# Patient Record
Sex: FEMALE | Race: BLACK OR AFRICAN AMERICAN | NOT HISPANIC OR LATINO
[De-identification: names, ages, dates, MRNs, and addresses within clinical notes are randomized per-mention and may not be internally consistent; named-entity substitution may affect disease eponyms.]

---

## 2019-09-23 ENCOUNTER — RESULT REVIEW (OUTPATIENT)
Age: 29
End: 2019-09-23

## 2020-01-06 ENCOUNTER — APPOINTMENT (OUTPATIENT)
Dept: OBGYN | Facility: CLINIC | Age: 30
End: 2020-01-06
Payer: COMMERCIAL

## 2020-01-06 ENCOUNTER — NON-APPOINTMENT (OUTPATIENT)
Age: 30
End: 2020-01-06

## 2020-01-06 VITALS
DIASTOLIC BLOOD PRESSURE: 70 MMHG | HEIGHT: 62 IN | BODY MASS INDEX: 45.45 KG/M2 | WEIGHT: 247 LBS | SYSTOLIC BLOOD PRESSURE: 128 MMHG

## 2020-01-06 DIAGNOSIS — Z87.42 PERSONAL HISTORY OF OTHER DISEASES OF THE FEMALE GENITAL TRACT: ICD-10-CM

## 2020-01-06 DIAGNOSIS — Z78.9 OTHER SPECIFIED HEALTH STATUS: ICD-10-CM

## 2020-01-06 DIAGNOSIS — A53.0 LATENT SYPHILIS, UNSPECIFIED AS EARLY OR LATE: ICD-10-CM

## 2020-01-06 LAB
BILIRUB UR QL STRIP: NORMAL
GLUCOSE UR-MCNC: NORMAL
HCG UR QL: 0.2 EU/DL
HCG UR QL: POSITIVE
HGB UR QL STRIP.AUTO: NORMAL
KETONES UR-MCNC: NORMAL
LEUKOCYTE ESTERASE UR QL STRIP: NORMAL
NITRITE UR QL STRIP: NORMAL
PH UR STRIP: 6
PROT UR STRIP-MCNC: NORMAL
QUALITY CONTROL: YES
SP GR UR STRIP: 1.02

## 2020-01-06 PROCEDURE — 81025 URINE PREGNANCY TEST: CPT

## 2020-01-06 PROCEDURE — 76801 OB US < 14 WKS SINGLE FETUS: CPT

## 2020-01-06 PROCEDURE — 81003 URINALYSIS AUTO W/O SCOPE: CPT | Mod: QW

## 2020-01-06 PROCEDURE — 0501F PRENATAL FLOW SHEET: CPT

## 2020-01-10 LAB
BACTERIA UR CULT: NORMAL
C TRACH RRNA SPEC QL NAA+PROBE: NOT DETECTED
N GONORRHOEA RRNA SPEC QL NAA+PROBE: NOT DETECTED
SOURCE AMPLIFICATION: NORMAL
SOURCE AMPLIFICATION: NORMAL
T VAGINALIS RRNA SPEC QL NAA+PROBE: NOT DETECTED

## 2020-01-10 RX ORDER — VITAMIN A, ASCORBIC ACID, CHOLECALCIFEROL, TOCOPHEROL, THIAMINE MONONITRATE, RIBOFLAVIN, PYRIDOXINE, FOLIC ACID, CYANOCOBALAMIN, CALCIUM CARBONATE, FERROUS FUMARATE, ZINC OXIDE, CUPRIC OXIDE, NIACINAMIDE, AND FISH OIL 27-1-250MG
27-1 & 312 KIT ORAL DAILY
Qty: 1 | Refills: 11 | Status: DISCONTINUED | COMMUNITY
Start: 2020-01-06 | End: 2020-01-10

## 2020-02-03 ENCOUNTER — APPOINTMENT (OUTPATIENT)
Dept: OBGYN | Facility: CLINIC | Age: 30
End: 2020-02-03
Payer: COMMERCIAL

## 2020-02-03 ENCOUNTER — LABORATORY RESULT (OUTPATIENT)
Age: 30
End: 2020-02-03

## 2020-02-03 ENCOUNTER — NON-APPOINTMENT (OUTPATIENT)
Age: 30
End: 2020-02-03

## 2020-02-03 VITALS
BODY MASS INDEX: 43.79 KG/M2 | DIASTOLIC BLOOD PRESSURE: 72 MMHG | WEIGHT: 238 LBS | HEIGHT: 62 IN | SYSTOLIC BLOOD PRESSURE: 130 MMHG

## 2020-02-03 LAB
BILIRUB UR QL STRIP: NORMAL
GLUCOSE UR-MCNC: NORMAL
HCG UR QL: 0.2 EU/DL
HGB UR QL STRIP.AUTO: NORMAL
KETONES UR-MCNC: NORMAL
LEUKOCYTE ESTERASE UR QL STRIP: NORMAL
NITRITE UR QL STRIP: NORMAL
PH UR STRIP: 6.5
PROT UR STRIP-MCNC: NORMAL
SP GR UR STRIP: 1.02

## 2020-02-03 PROCEDURE — 0502F SUBSEQUENT PRENATAL CARE: CPT

## 2020-02-03 PROCEDURE — 76801 OB US < 14 WKS SINGLE FETUS: CPT

## 2020-02-04 LAB
ABO + RH PNL BLD: NORMAL
BASOPHILS # BLD AUTO: 0.04 K/UL
BASOPHILS NFR BLD AUTO: 0.3 %
BLD GP AB SCN SERPL QL: NORMAL
EOSINOPHIL # BLD AUTO: 0.12 K/UL
EOSINOPHIL NFR BLD AUTO: 1 %
HBV SURFACE AG SERPL QL IA: NONREACTIVE
HCT VFR BLD CALC: 29.1 %
HCV AB SER QL: NONREACTIVE
HCV S/CO RATIO: 0.11 S/CO
HGB BLD-MCNC: 7.5 G/DL
HIV1+2 AB SPEC QL IA.RAPID: NONREACTIVE
IMM GRANULOCYTES NFR BLD AUTO: 0.4 %
LYMPHOCYTES # BLD AUTO: 2.87 K/UL
LYMPHOCYTES NFR BLD AUTO: 24 %
MAN DIFF?: NORMAL
MCHC RBC-ENTMCNC: 16.9 PG
MCHC RBC-ENTMCNC: 25.8 GM/DL
MCV RBC AUTO: 65.5 FL
MEV IGG FLD QL IA: 51 AU/ML
MEV IGG+IGM SER-IMP: POSITIVE
MONOCYTES # BLD AUTO: 0.69 K/UL
MONOCYTES NFR BLD AUTO: 5.8 %
NEUTROPHILS # BLD AUTO: 8.2 K/UL
NEUTROPHILS NFR BLD AUTO: 68.5 %
PLATELET # BLD AUTO: 495 K/UL
RBC # BLD: 4.44 M/UL
RBC # FLD: 24.1 %
RUBV IGG FLD-ACNC: 2.3 INDEX
RUBV IGG SER-IMP: POSITIVE
T PALLIDUM AB SER QL IA: NEGATIVE
TSH SERPL-ACNC: 1.5 UIU/ML
VZV AB TITR SER: POSITIVE
VZV IGG SER IF-ACNC: 1927 INDEX
WBC # FLD AUTO: 11.97 K/UL

## 2020-02-06 LAB
B19V IGG SER QL IA: 0.2 INDEX
B19V IGG+IGM SER-IMP: NEGATIVE
B19V IGG+IGM SER-IMP: NORMAL
B19V IGM FLD-ACNC: 0.2
B19V IGM SER-ACNC: NEGATIVE
HGB A MFR BLD: 98.1 %
HGB A2 MFR BLD: 1.9 %
HGB FRACT BLD-IMP: NORMAL
LEAD BLD-MCNC: <1 UG/DL

## 2020-02-07 ENCOUNTER — NON-APPOINTMENT (OUTPATIENT)
Age: 30
End: 2020-02-07

## 2020-02-07 ENCOUNTER — APPOINTMENT (OUTPATIENT)
Dept: OBGYN | Facility: CLINIC | Age: 30
End: 2020-02-07
Payer: COMMERCIAL

## 2020-02-07 VITALS
BODY MASS INDEX: 45.27 KG/M2 | SYSTOLIC BLOOD PRESSURE: 122 MMHG | DIASTOLIC BLOOD PRESSURE: 78 MMHG | WEIGHT: 246 LBS | HEIGHT: 62 IN

## 2020-02-07 LAB
BILIRUB UR QL STRIP: NORMAL
GLUCOSE UR-MCNC: NORMAL
HCG UR QL: 0.2 EU/DL
HGB UR QL STRIP.AUTO: NORMAL
KETONES UR-MCNC: NORMAL
LEUKOCYTE ESTERASE UR QL STRIP: NORMAL
NITRITE UR QL STRIP: NORMAL
PH UR STRIP: 5
PROT UR STRIP-MCNC: NORMAL
SP GR UR STRIP: 1.03

## 2020-02-07 PROCEDURE — 76801 OB US < 14 WKS SINGLE FETUS: CPT

## 2020-02-07 PROCEDURE — 36415 COLL VENOUS BLD VENIPUNCTURE: CPT

## 2020-02-07 PROCEDURE — 0502F SUBSEQUENT PRENATAL CARE: CPT

## 2020-02-10 ENCOUNTER — TRANSCRIPTION ENCOUNTER (OUTPATIENT)
Age: 30
End: 2020-02-10

## 2020-02-12 ENCOUNTER — OUTPATIENT (OUTPATIENT)
Dept: OUTPATIENT SERVICES | Facility: HOSPITAL | Age: 30
LOS: 1 days | Discharge: HOME | End: 2020-02-12

## 2020-02-13 ENCOUNTER — RESULT REVIEW (OUTPATIENT)
Age: 30
End: 2020-02-13

## 2020-02-13 ENCOUNTER — APPOINTMENT (OUTPATIENT)
Dept: HEMATOLOGY ONCOLOGY | Facility: CLINIC | Age: 30
End: 2020-02-13
Payer: COMMERCIAL

## 2020-02-13 VITALS
SYSTOLIC BLOOD PRESSURE: 163 MMHG | OXYGEN SATURATION: 100 % | HEIGHT: 62.99 IN | RESPIRATION RATE: 18 BRPM | HEART RATE: 112 BPM | TEMPERATURE: 99.1 F | WEIGHT: 246.9 LBS | DIASTOLIC BLOOD PRESSURE: 96 MMHG | BODY MASS INDEX: 43.75 KG/M2

## 2020-02-13 LAB
ALBUMIN SERPL ELPH-MCNC: 4.1 G/DL
ALP BLD-CCNC: 50 U/L
ALT SERPL-CCNC: 9 U/L
ANION GAP SERPL CALC-SCNC: 11 MMOL/L
AST SERPL-CCNC: 18 U/L
BILIRUB SERPL-MCNC: 0.2 MG/DL
BUN SERPL-MCNC: 7 MG/DL
CALCIUM SERPL-MCNC: 9.4 MG/DL
CHLORIDE SERPL-SCNC: 102 MMOL/L
CO2 SERPL-SCNC: 19 MMOL/L
CREAT SERPL-MCNC: 0.58 MG/DL
CRP SERPL-MCNC: 1.25 MG/DL
ERYTHROCYTE [SEDIMENTATION RATE] IN BLOOD: 25 MM/HR — HIGH (ref 0–15)
FERRITIN SERPL-MCNC: 8 NG/ML
FOLATE SERPL-MCNC: >20 NG/ML
GLUCOSE SERPL-MCNC: 113 MG/DL
IRON SATN MFR SERPL: 6 %
IRON SERPL-MCNC: 25 UG/DL
LDH SERPL-CCNC: 195 U/L
POTASSIUM SERPL-SCNC: 4.2 MMOL/L
PROT SERPL-MCNC: 7.3 G/DL
SODIUM SERPL-SCNC: 133 MMOL/L
TIBC SERPL-MCNC: 451 UG/DL
UIBC SERPL-MCNC: 425 UG/DL
VIT B12 SERPL-MCNC: 521 PG/ML

## 2020-02-13 PROCEDURE — 99204 OFFICE O/P NEW MOD 45 MIN: CPT | Mod: 25

## 2020-02-13 NOTE — HISTORY OF PRESENT ILLNESS
[0 - No Distress] : Distress Level: 0 [de-identified] : 30 yo woman with 12 weeks pregnancy, referred for evaluation and management of anemia of pregnancy.\par \par Patient feels fatigue, tired, lightheadedness, dizziness, positive for palpitations, no chest pain, denies melena or hematochezia. Denies prior history of anemia, this is her second pregnancy, no history of anemia with first pregnancy. \par \par Labs 2/4/2020: WBC 11.97, Hb 7.5 g/dl, Hct 29.1%, MCV 65.5%, RDW 24.1%, . \par \par

## 2020-02-13 NOTE — REVIEW OF SYSTEMS
[Negative] : Allergic/Immunologic [Fatigue] : fatigue [Dizziness] : dizziness [SOB on Exertion] : shortness of breath during exertion [Palpitations] : palpitations [Easy Bleeding] : no tendency for easy bleeding

## 2020-02-13 NOTE — CONSULT LETTER
[Dear  ___] : Dear  [unfilled], [Consult Letter:] : I had the pleasure of evaluating your patient, [unfilled]. [Please see my note below.] : Please see my note below. [Consult Closing:] : Thank you very much for allowing me to participate in the care of this patient.  If you have any questions, please do not hesitate to contact me. [Sincerely,] : Sincerely, [FreeTextEntry2] : Dr Phil Alonso

## 2020-02-13 NOTE — ASSESSMENT
[FreeTextEntry1] : 30 yo woman with 12 weeks pregnancy, referred for evaluation and management of anemia of pregnancy.\par \par Patient feels fatigue, tired, lightheadedness, dizziness, positive for palpitations, no chest pain, denies melena or hematochezia. Denies prior history of anemia, this is her second pregnancy, no history of anemia with first pregnancy. \par \par Labs 2/4/2020: WBC 11.97, Hb 7.5 g/dl, Hct 29.1%, MCV 65.5%, RDW 24.1%, . \par \par I had a detailed discussion today with the patient regarding the natural history, epidemiology, diagnosis and treatment of  iron deficiency anemia. I reviewed her laboratory studies in detail today. I then discussed treatment with Venofer 200 mg every other day x 5 doses.  I reviewed with patient the benefits versus risks of therapy. I answered all her questions to satisfaction.\par \par Greater than 50% of the encounter time was spent on counseling and coordination of care for SALEEM  and I have spent  45   minutes of face to face time with the patient.\par \par RTC 2 months.

## 2020-02-14 LAB
DEPRECATED KAPPA LC FREE/LAMBDA SER: 0.93 RATIO
HAV IGM SER QL: NONREACTIVE
HAV IGM SER QL: NONREACTIVE
HBV CORE IGG+IGM SER QL: NONREACTIVE
HBV CORE IGM SER QL: NONREACTIVE
HBV CORE IGM SER QL: NONREACTIVE
HBV SURFACE AB SER QL: REACTIVE
HBV SURFACE AG SER QL: NONREACTIVE
HBV SURFACE AG SER QL: NONREACTIVE
HCV AB SER QL: NONREACTIVE
HCV S/CO RATIO: 0.11 S/CO
KAPPA LC CSF-MCNC: 1.23 MG/DL
KAPPA LC SERPL-MCNC: 1.15 MG/DL
M PROTEIN SPEC IFE-MCNC: NORMAL

## 2020-02-15 LAB
ALPHA - GLOBIN COMMON MUTATION RESULT: NORMAL
ALPHA - GLOBIN MUTATION VERBATIM: NORMAL

## 2020-02-19 ENCOUNTER — APPOINTMENT (OUTPATIENT)
Dept: OBGYN | Facility: CLINIC | Age: 30
End: 2020-02-19
Payer: COMMERCIAL

## 2020-02-19 PROCEDURE — 36415 COLL VENOUS BLD VENIPUNCTURE: CPT

## 2020-02-26 ENCOUNTER — APPOINTMENT (OUTPATIENT)
Dept: INFUSION THERAPY | Facility: HOSPITAL | Age: 30
End: 2020-02-26

## 2020-02-26 ENCOUNTER — EMERGENCY (EMERGENCY)
Facility: HOSPITAL | Age: 30
LOS: 1 days | Discharge: HOME | End: 2020-02-26
Attending: EMERGENCY MEDICINE | Admitting: EMERGENCY MEDICINE
Payer: COMMERCIAL

## 2020-02-26 ENCOUNTER — OUTPATIENT (OUTPATIENT)
Dept: OUTPATIENT SERVICES | Facility: HOSPITAL | Age: 30
LOS: 1 days | End: 2020-02-26

## 2020-02-26 VITALS
HEART RATE: 105 BPM | OXYGEN SATURATION: 100 % | DIASTOLIC BLOOD PRESSURE: 90 MMHG | TEMPERATURE: 99 F | SYSTOLIC BLOOD PRESSURE: 133 MMHG | RESPIRATION RATE: 18 BRPM

## 2020-02-26 DIAGNOSIS — D64.9 ANEMIA, UNSPECIFIED: ICD-10-CM

## 2020-02-26 LAB
ALBUMIN SERPL ELPH-MCNC: 3.7 G/DL — SIGNIFICANT CHANGE UP (ref 3.3–5)
ALP SERPL-CCNC: 49 U/L — SIGNIFICANT CHANGE UP (ref 40–120)
ALT FLD-CCNC: 15 U/L — SIGNIFICANT CHANGE UP (ref 4–33)
ANION GAP SERPL CALC-SCNC: 12 MMO/L — SIGNIFICANT CHANGE UP (ref 7–14)
ANISOCYTOSIS BLD QL: SIGNIFICANT CHANGE UP
APTT BLD: 24.3 SEC — LOW (ref 27.5–36.3)
AST SERPL-CCNC: 15 U/L — SIGNIFICANT CHANGE UP (ref 4–32)
BASOPHILS # BLD AUTO: 0.02 K/UL — SIGNIFICANT CHANGE UP (ref 0–0.2)
BASOPHILS NFR BLD AUTO: 0.2 % — SIGNIFICANT CHANGE UP (ref 0–2)
BASOPHILS NFR SPEC: 0 % — SIGNIFICANT CHANGE UP (ref 0–2)
BILIRUB SERPL-MCNC: < 0.2 MG/DL — LOW (ref 0.2–1.2)
BLASTS # FLD: 0 % — SIGNIFICANT CHANGE UP (ref 0–0)
BLD GP AB SCN SERPL QL: NEGATIVE — SIGNIFICANT CHANGE UP
BUN SERPL-MCNC: 4 MG/DL — LOW (ref 7–23)
CALCIUM SERPL-MCNC: 9.4 MG/DL — SIGNIFICANT CHANGE UP (ref 8.4–10.5)
CHLORIDE SERPL-SCNC: 104 MMOL/L — SIGNIFICANT CHANGE UP (ref 98–107)
CO2 SERPL-SCNC: 18 MMOL/L — LOW (ref 22–31)
CREAT SERPL-MCNC: 0.5 MG/DL — SIGNIFICANT CHANGE UP (ref 0.5–1.3)
ELLIPTOCYTES BLD QL SMEAR: SIGNIFICANT CHANGE UP
EOSINOPHIL # BLD AUTO: 0 K/UL — SIGNIFICANT CHANGE UP (ref 0–0.5)
EOSINOPHIL NFR BLD AUTO: 0 % — SIGNIFICANT CHANGE UP (ref 0–6)
EOSINOPHIL NFR FLD: 0 % — SIGNIFICANT CHANGE UP (ref 0–6)
GIANT PLATELETS BLD QL SMEAR: PRESENT — SIGNIFICANT CHANGE UP
GLUCOSE SERPL-MCNC: 126 MG/DL — HIGH (ref 70–99)
HCT VFR BLD CALC: 26.3 % — LOW (ref 34.5–45)
HGB BLD-MCNC: 7.2 G/DL — LOW (ref 11.5–15.5)
HYPOCHROMIA BLD QL: SIGNIFICANT CHANGE UP
IMM GRANULOCYTES NFR BLD AUTO: 1.1 % — SIGNIFICANT CHANGE UP (ref 0–1.5)
INR BLD: 1.03 — SIGNIFICANT CHANGE UP (ref 0.88–1.17)
LYMPHOCYTES # BLD AUTO: 0.89 K/UL — LOW (ref 1–3.3)
LYMPHOCYTES # BLD AUTO: 7.3 % — LOW (ref 13–44)
LYMPHOCYTES NFR SPEC AUTO: 3.5 % — LOW (ref 13–44)
MCHC RBC-ENTMCNC: 17.2 PG — LOW (ref 27–34)
MCHC RBC-ENTMCNC: 27.4 % — LOW (ref 32–36)
MCV RBC AUTO: 62.8 FL — LOW (ref 80–100)
METAMYELOCYTES # FLD: 0 % — SIGNIFICANT CHANGE UP (ref 0–1)
MICROCYTES BLD QL: SIGNIFICANT CHANGE UP
MONOCYTES # BLD AUTO: 0.27 K/UL — SIGNIFICANT CHANGE UP (ref 0–0.9)
MONOCYTES NFR BLD AUTO: 2.2 % — SIGNIFICANT CHANGE UP (ref 2–14)
MONOCYTES NFR BLD: 0.9 % — LOW (ref 2–9)
MYELOCYTES NFR BLD: 0 % — SIGNIFICANT CHANGE UP (ref 0–0)
NEUTROPHIL AB SER-ACNC: 95.6 % — HIGH (ref 43–77)
NEUTROPHILS # BLD AUTO: 10.93 K/UL — HIGH (ref 1.8–7.4)
NEUTROPHILS NFR BLD AUTO: 89.2 % — HIGH (ref 43–77)
NEUTS BAND # BLD: 0 % — SIGNIFICANT CHANGE UP (ref 0–6)
NRBC # FLD: 0 K/UL — SIGNIFICANT CHANGE UP (ref 0–0)
OTHER - HEMATOLOGY %: 0 — SIGNIFICANT CHANGE UP
PLATELET # BLD AUTO: 466 K/UL — HIGH (ref 150–400)
PLATELET COUNT - ESTIMATE: NORMAL — SIGNIFICANT CHANGE UP
PMV BLD: 9.4 FL — SIGNIFICANT CHANGE UP (ref 7–13)
POIKILOCYTOSIS BLD QL AUTO: SIGNIFICANT CHANGE UP
POLYCHROMASIA BLD QL SMEAR: SIGNIFICANT CHANGE UP
POTASSIUM SERPL-MCNC: 4.2 MMOL/L — SIGNIFICANT CHANGE UP (ref 3.5–5.3)
POTASSIUM SERPL-SCNC: 4.2 MMOL/L — SIGNIFICANT CHANGE UP (ref 3.5–5.3)
PROMYELOCYTES # FLD: 0 % — SIGNIFICANT CHANGE UP (ref 0–0)
PROT SERPL-MCNC: 7.4 G/DL — SIGNIFICANT CHANGE UP (ref 6–8.3)
PROTHROM AB SERPL-ACNC: 11.9 SEC — SIGNIFICANT CHANGE UP (ref 9.8–13.1)
RBC # BLD: 4.19 M/UL — SIGNIFICANT CHANGE UP (ref 3.8–5.2)
RBC # FLD: 22.6 % — HIGH (ref 10.3–14.5)
RH IG SCN BLD-IMP: POSITIVE — SIGNIFICANT CHANGE UP
RH IG SCN BLD-IMP: POSITIVE — SIGNIFICANT CHANGE UP
SMUDGE CELLS # BLD: PRESENT — SIGNIFICANT CHANGE UP
SODIUM SERPL-SCNC: 134 MMOL/L — LOW (ref 135–145)
VARIANT LYMPHS # BLD: 0 % — SIGNIFICANT CHANGE UP
WBC # BLD: 12.24 K/UL — HIGH (ref 3.8–10.5)
WBC # FLD AUTO: 12.24 K/UL — HIGH (ref 3.8–10.5)

## 2020-02-26 PROCEDURE — 71045 X-RAY EXAM CHEST 1 VIEW: CPT | Mod: 26

## 2020-02-26 PROCEDURE — 99218: CPT

## 2020-02-26 RX ORDER — SODIUM CHLORIDE 9 MG/ML
1000 INJECTION INTRAMUSCULAR; INTRAVENOUS; SUBCUTANEOUS ONCE
Refills: 0 | Status: COMPLETED | OUTPATIENT
Start: 2020-02-26 | End: 2020-02-26

## 2020-02-26 RX ADMIN — SODIUM CHLORIDE 1000 MILLILITER(S): 9 INJECTION INTRAMUSCULAR; INTRAVENOUS; SUBCUTANEOUS at 16:10

## 2020-02-26 NOTE — CONSULT NOTE ADULT - ASSESSMENT
28 yo , LMP 2019 at 13+3 wks gestation (NIKHIL 20) p/w symptomatic anemia with H/H 7.2/26.3. Fetal heart rate confirmed by bedside sono on presentation to ED. Patient has no obstetric complaints at this time.

## 2020-02-26 NOTE — ED ADULT NURSE NOTE - OBJECTIVE STATEMENT
Pt received to spot 22 14 weeks pregnant, second pregnancy complaining of dizziness and palpitations that began this morning. Pt reports she has had similar episodes recently with this episode occurring after she received an iron transfusion this morning for hx of anemia. Pt a&ox4, skin intact, respirations even and unlabored, pt reports feeling palpations now. Pt sinus tachy on CM, PA Petra at bedside, will await orders and continue to monitor.

## 2020-02-26 NOTE — ED PROVIDER NOTE - NS ED ROS FT
ROS:  GENERAL: No fever, no chills  EYES: no change in vision  HEENT: no trouble swallowing, no trouble speaking  CARDIAC: no chest pain  PULMONARY: no cough, +shortness of breath, +BUTCHER   GI: no abdominal pain, no nausea, no vomiting, no diarrhea, no constipation  : No dysuria, no frequency, no change in appearance, or odor of urine  SKIN: no rashes, +pallor  NEURO: no headache, no weakness  MSK: No joint pain

## 2020-02-26 NOTE — ED PROVIDER NOTE - OBJECTIVE STATEMENT
28 y/o F PMHx anemia  currently 14 weeks pregnant here w/ intermittent palpitations and shortness of breath x 3 weeks. Sx begin randomly, even while at rest, lasting about 1 minute. NO hx of similar episodes in the past. 30 y/o F PMHx anemia  currently 14 weeks pregnant here w/ intermittent palpitations and shortness of breath x 3 weeks. Sx begin randomly, even while at rest, lasting about 1 minute, however worse w/ exertion. NO hx of similar episodes in the past. No vaginal bleeding now, but admits to heavy menses previously. Saw a hematologist 2 weeks ago, was recommended to get IV iron first, possible blood transfusion at some point. Denies fevers, chills, N/V, chest pain, calf pain, recent travel, or any other complaints.

## 2020-02-26 NOTE — ED CDU PROVIDER INITIAL DAY NOTE - OBJECTIVE STATEMENT
1 y/o F PMHx anemia  currently 14 weeks pregnant here w/ intermittent palpitations and shortness of breath x 3 weeks. Sx begin randomly, even while at rest

## 2020-02-26 NOTE — ED PROVIDER NOTE - CLINICAL SUMMARY MEDICAL DECISION MAKING FREE TEXT BOX
Betsey: 14 wks preg, h/o anemia, p/w palpitations and SOBgot iron infusion today. Decreased breath sounds (B) upper lobes. No LE edema. Not likely PNA: no infectious Sx (eg, purulent cough). Bedside echo for baby check and look for RV strain, though PE unlikely this early in pregnancy. Sx most likely 2/2 anemia.

## 2020-02-26 NOTE — ED CDU PROVIDER INITIAL DAY NOTE - NS ED ROS FT
ROS:  GENERAL: No fever, no chills  EYES: no change in vision  HEENT: no trouble swallowing, no trouble speaking  CARDIAC: no chest pain  PULMONARY: +shortness of breath  GI: no abdominal pain, no nausea, no vomiting, no diarrhea, no constipation  : No dysuria, no frequency, no change in appearance, or odor of urine  SKIN: no rashes  NEURO: no headache, no weakness  MSK: No joint pain

## 2020-02-26 NOTE — ED PROVIDER NOTE - ATTENDING CONTRIBUTION TO CARE
I performed a face-to-face evaluation of the patient and performed a history and physical examination. I agree with the history and physical examination.    14 wks preg, h/o anemia, p/w palpitations and SOBgot iron infusion today. Decreased breath sounds (B) upper lobes. No LE edema. Not likely PNA: no infectious Sx (eg, purulent cough). Bedside echo for baby check and look for RV strain, though PE unlikely this early in pregnancy. Sx most likely 2/2 anemia.

## 2020-02-26 NOTE — ED ADULT TRIAGE NOTE - CHIEF COMPLAINT QUOTE
Pt states she is 14 weeks pregnant and received an iron infusion at 9:30 am and since then she is very lightheaded and feels palpitations and her heart racing.

## 2020-02-26 NOTE — ED CDU PROVIDER INITIAL DAY NOTE - DETAILS
28 y/o F PMHx anemia  currently 14 weeks pregnant here w/ intermittent palpitations and shortness of breath x 3 weeks. Sx begin randomly, even while at rest.     On review, patient's Hg found to be low likely causing her presenting symptoms. Patient denies any active bleeding at this time. Patient discussed with her hematologist who suggests a transfusion given symptomatic presentation. The patient admitted to CDU for transfusion and observation.

## 2020-02-26 NOTE — ED PROVIDER NOTE - PROGRESS NOTE DETAILS
Betsey: Hb ~7. Did shared decision-making to see if she wanted a blood transfusion. Walked around ER. Very symptomatic. CDU for blood transfusion. FETAL  done via Bedside US w/ Dr. Russell. Spoke w/ heme fellow on call, reviewed pts outpatient chart, does not seem to mention a contraindication to transfusion, state the hgb was in the 8s so they wanted to try iv iron first. Spoke w/ OB, also agree pt is symptomatic and should receive blood, but will come to consult and see the pt first.

## 2020-02-26 NOTE — ED PROVIDER NOTE - PHYSICAL EXAMINATION
Vital signs reviewed.   CONSTITUTIONAL: Well-appearing; well-nourished; in no apparent distress.   HEAD: Normocephalic, atraumatic.  EYES: PERRL, EOM intact, conjunctiva PALE.  ENT: normal nose; no rhinorrhea; normal pharynx with no tonsillar hypertrophy, no erythema, no exudate, no lymphadenopathy.  NECK/LYMPH: Supple; non-tender; no cervical lymphadenopathy.  CARD: Normal S1, S2; tachycardic. No lower extremity edema or calf tenderness zan.   RESP: Normal chest excursion with respiration; breath sounds clear and equal bilaterally; no wheezes, rhonchi, or rales.  ABD/GI: soft, non-distended; non-tender; no palpable organomegaly, no pulsatile mass.  EXT/MS: moves all extremities; distal pulses are normal, no pedal edema.  SKIN: Pale, warm; dry; good turgor; no apparent lesions or exudate noted.  NEURO: Awake, alert, oriented x 3, no gross deficits, CN II-XII grossly intact, no motor or sensory deficit noted.  PSYCH: Normal mood; appropriate affect.

## 2020-02-26 NOTE — ED CDU PROVIDER INITIAL DAY NOTE - ATTENDING CONTRIBUTION TO CARE
30 y/o F PMHx anemia  currently 14 weeks pregnant here w/ intermittent palpitations and shortness of breath x 3 weeks. Sx begin randomly, even while at rest.     On review, patient's Hg found to be low likely causing her presenting symptoms. Patient denies any active bleeding at this time. Patient discussed with her hematologist who suggests a transfusion given symptomatic presentation. The patient admitted to CDU for transfusion and observation.

## 2020-02-26 NOTE — CONSULT NOTE ADULT - SUBJECTIVE AND OBJECTIVE BOX
28 y/o LMP 11/2019 at 13+3 wks gestation (NIKHIL 8/31/20) p/w lightheadedness fatigue, palpitations and SOB x 3 weeks with worsening of symptoms today. She reports h/o anemia since 8/2019, worsening during pregnancy. She had a Fe transfusion in the office this morning. She denies VB, LOF, ctx, and endorses FM despite early gestation.     Name of GYN Physician: Dr. Alonso     OBhx:      Gynhx: Denies fibroids, ovarian cysts, endometriosis, AUB, STIs, abnormal Pap smears     PMhx: PAST MEDICAL & SURGICAL HISTORY:  Anemia      PSHx:     Fhx: FAMILY HISTORY:      Social: denies tobacco, alcohol, recreational drugs     Home meds:     Hospital Meds:   MEDICATIONS  (STANDING):    MEDICATIONS  (PRN):      Allergies    No Known Allergies    Intolerances        Vital Signs Last 24 Hrs  T(C): 37.1 (27 Feb 2020 00:30), Max: 37.1 (27 Feb 2020 00:30)  T(F): 98.7 (27 Feb 2020 00:30), Max: 98.7 (27 Feb 2020 00:30)  HR: 96 (27 Feb 2020 00:30) (87 - 105)  BP: 125/57 (27 Feb 2020 00:30) (111/61 - 133/90)  BP(mean): --  RR: 18 (27 Feb 2020 00:30) (16 - 18)  SpO2: 100% (27 Feb 2020 00:30) (100% - 100%)    Physical Exam:   General: sitting comfortably in bed, NAD   CV: RR S1S2 no m/r/g  Lungs: CTA b/l, good air flow b/l   Back: No CVA tenderness b/l  Abd: Soft, non-tender, non-distended. No rebound or guarding     Pelvic:   Ext: non-tender b/l, no edema     LABS:                              7.2    12.24 )-----------( 466      ( 26 Feb 2020 16:07 )             26.3     02-26    134<L>  |  104  |  4<L>  ----------------------------<  126<H>  4.2   |  18<L>  |  0.50    Ca    9.4      26 Feb 2020 16:07    TPro  7.4  /  Alb  3.7  /  TBili  < 0.2<L>  /  DBili  x   /  AST  15  /  ALT  15  /  AlkPhos  49  02-26    I&O's Detail    PT/INR - ( 26 Feb 2020 16:07 )   PT: 11.9 SEC;   INR: 1.03          PTT - ( 26 Feb 2020 16:07 )  PTT:24.3 SEC      RADIOLOGY & ADDITIONAL STUDIES: 28 y/o  LMP 2019 at 13+3 wks gestation (NIKHIL 20) p/w lightheadedness, fatigue, palpitations and SOB x3 weeks with worsening of symptoms today. She reports h/o anemia since 2019 with worsening during pregnancy. She had a Fe transfusion in the office this morning for chronic anemia, then developed worsening of symptoms which prompted her to come into the ED. She denies VB, LOF, ctx, and endorses FM despite early gestation.     Name of GYN Physician: Dr. Alonso     OBhx: C/Sx1 (), mTOPx1   Gynhx: h/o fibroids, resolved ovarian cysts, ?Abnl Pap smears last year  PMhx: Anemia   PSHx: C/S x1 ()    Social: denies tobacco, alcohol, recreational drugs   Meds: ASA, s/p Fe transfusion (20)   Allergies: NKDA     Vital Signs Last 24 Hrs  T(C): 37.1 (2020 00:30), Max: 37.1 (2020 00:30)  T(F): 98.7 (2020 00:30), Max: 98.7 (2020 00:30)  HR: 96 (2020 00:30) (87 - 105)  BP: 125/57 (2020 00:30) (111/61 - 133/90)  BP(mean): --  RR: 18 (2020 00:30) (16 - 18)  SpO2: 100% (2020 00:30) (100% - 100%)    Physical Exam:   General: sitting comfortably in bed, NAD   CV: RRR S1S2 no m/r/g  Lungs: CTA b/l, good air flow b/l   Back: No CVA tenderness b/l  Abd: Soft, non-tender, non-distended. No rebound or guarding     Pelvic: deferred   Ext: non-tender b/l, no edema     LABS:                         7.2    12.24 )-----------( 466      ( 2020 16:07 )             26.3     02-26    134<L>  |  104  |  4<L>  ----------------------------<  126<H>  4.2   |  18<L>  |  0.50    Ca    9.4      2020 16:07    TPro  7.4  /  Alb  3.7  /  TBili  < 0.2<L>  /  DBili  x   /  AST  15  /  ALT  15  /  AlkPhos  49  -    I&O's Detail    PT/INR - ( 2020 16:07 )   PT: 11.9 SEC;   INR: 1.03          PTT - ( 2020 16:07 )  PTT:24.3 SEC 30 y/o  LMP 2019 at 13+3 wks gestation (NIKHIL 20) p/w lightheadedness, fatigue, palpitations and SOB x3 weeks with worsening of symptoms today. She reports h/o anemia since 2019 with worsening during pregnancy. She had a Fe transfusion in the office this morning for chronic anemia, then developed worsening of symptoms in the afternoon which prompted her to come into the ED. She denies VB, LOF, ctx, and endorses FM despite early gestation.     Name of GYN Physician: Dr. Alonso     OBhx: C/Sx1 (), mTOPx1   Gynhx: h/o fibroids, resolved ovarian cysts, ?Abnl Pap smears last year  PMhx: Anemia   PSHx: C/S x1 ()    Social: denies tobacco, alcohol, recreational drugs   Meds: ASA, s/p Fe transfusion (20)   Allergies: NKDA     Vital Signs Last 24 Hrs  T(C): 37.1 (2020 00:30), Max: 37.1 (2020 00:30)  T(F): 98.7 (2020 00:30), Max: 98.7 (2020 00:30)  HR: 96 (2020 00:30) (87 - 105)  BP: 125/57 (2020 00:30) (111/61 - 133/90)  BP(mean): --  RR: 18 (2020 00:30) (16 - 18)  SpO2: 100% (2020 00:30) (100% - 100%)    Physical Exam:   General: sitting comfortably in bed, NAD   CV: RRR S1S2 no m/r/g  Lungs: CTA b/l, good air flow b/l   Back: No CVA tenderness b/l  Abd: Soft, non-tender, non-distended. No rebound or guarding     Pelvic: deferred   Ext: non-tender b/l, no edema     LABS:                         7.2    12.24 )-----------( 466      ( 2020 16:07 )             26.3     02-26    134<L>  |  104  |  4<L>  ----------------------------<  126<H>  4.2   |  18<L>  |  0.50    Ca    9.4      2020 16:07    TPro  7.4  /  Alb  3.7  /  TBili  < 0.2<L>  /  DBili  x   /  AST  15  /  ALT  15  /  AlkPhos  49  02-26    I&O's Detail    PT/INR - ( 2020 16:07 )   PT: 11.9 SEC;   INR: 1.03          PTT - ( 2020 16:07 )  PTT:24.3 SEC

## 2020-02-26 NOTE — CONSULT NOTE ADULT - PROBLEM SELECTOR RECOMMENDATION 9
- No acute OB interventions at this time   - Agree with CDU admission and 1upRBC transfusion for symptomatic anemia   - F/u post-transfusion CBC       Esra Nilay PGY2  d/w Dr. Bravo

## 2020-02-27 VITALS
HEART RATE: 92 BPM | TEMPERATURE: 99 F | RESPIRATION RATE: 18 BRPM | DIASTOLIC BLOOD PRESSURE: 69 MMHG | OXYGEN SATURATION: 100 % | SYSTOLIC BLOOD PRESSURE: 131 MMHG

## 2020-02-27 DIAGNOSIS — R11.2 NAUSEA WITH VOMITING, UNSPECIFIED: ICD-10-CM

## 2020-02-27 DIAGNOSIS — D64.9 ANEMIA, UNSPECIFIED: ICD-10-CM

## 2020-02-27 DIAGNOSIS — D46.9 MYELODYSPLASTIC SYNDROME, UNSPECIFIED: ICD-10-CM

## 2020-02-27 DIAGNOSIS — D50.9 IRON DEFICIENCY ANEMIA, UNSPECIFIED: ICD-10-CM

## 2020-02-27 LAB
HCT VFR BLD CALC: 27.1 % — LOW (ref 34.5–45)
HCT VFR BLD CALC: 28.9 % — LOW (ref 34.5–45)
HGB BLD-MCNC: 8 G/DL — LOW (ref 11.5–15.5)
HGB BLD-MCNC: 8.1 G/DL — LOW (ref 11.5–15.5)
MCHC RBC-ENTMCNC: 18.8 PG — LOW (ref 27–34)
MCHC RBC-ENTMCNC: 19.7 PG — LOW (ref 27–34)
MCHC RBC-ENTMCNC: 27.7 % — LOW (ref 32–36)
MCHC RBC-ENTMCNC: 29.9 % — LOW (ref 32–36)
MCV RBC AUTO: 65.8 FL — LOW (ref 80–100)
MCV RBC AUTO: 68 FL — LOW (ref 80–100)
NRBC # FLD: 0.03 K/UL — SIGNIFICANT CHANGE UP (ref 0–0)
NRBC # FLD: 0.03 K/UL — SIGNIFICANT CHANGE UP (ref 0–0)
PLATELET # BLD AUTO: 405 K/UL — HIGH (ref 150–400)
PLATELET # BLD AUTO: 436 K/UL — HIGH (ref 150–400)
PMV BLD: 9.8 FL — SIGNIFICANT CHANGE UP (ref 7–13)
PMV BLD: 9.9 FL — SIGNIFICANT CHANGE UP (ref 7–13)
RBC # BLD: 4.12 M/UL — SIGNIFICANT CHANGE UP (ref 3.8–5.2)
RBC # BLD: 4.25 M/UL — SIGNIFICANT CHANGE UP (ref 3.8–5.2)
RBC # FLD: 24.1 % — HIGH (ref 10.3–14.5)
RBC # FLD: 24.2 % — HIGH (ref 10.3–14.5)
WBC # BLD: 16.68 K/UL — HIGH (ref 3.8–10.5)
WBC # BLD: 17.01 K/UL — HIGH (ref 3.8–10.5)
WBC # FLD AUTO: 16.68 K/UL — HIGH (ref 3.8–10.5)
WBC # FLD AUTO: 17.01 K/UL — HIGH (ref 3.8–10.5)

## 2020-02-27 PROCEDURE — 99217: CPT

## 2020-02-27 NOTE — ED CDU PROVIDER DISPOSITION NOTE - PATIENT PORTAL LINK FT
You can access the FollowMyHealth Patient Portal offered by [facility] by registering at the following website: [email or web address]. By joining [employer]’s FollowMyHealth portal, you will also be able to view your health information using other applications (apps) compatible with our system.

## 2020-02-27 NOTE — ED CDU PROVIDER SUBSEQUENT DAY NOTE - MEDICAL DECISION MAKING DETAILS
28 y/o F PMHx anemia  currently 14 weeks pregnant here w/ intermittent palpitations and shortness of breath x 3 weeks. Noted to be anemic at with hgb of 7.2. Pt transferred to CDU for 2 units PRBC.

## 2020-02-27 NOTE — ED CDU PROVIDER SUBSEQUENT DAY NOTE - ATTENDING CONTRIBUTION TO CARE
Isai: I have seen and examined the patient face to face, have reviewed and addended the HPI, PE and a/p as necessary.     28 y/o F PMHx anemia  currently 14 weeks pregnant here w/ intermittent palpitations and shortness of breath x 3 weeks. Noted to be anemic at with hgb of 7.2. Noted to have received 2 units PRBC with no acute issues.  Pt reports generalized fatigue.  Received Iron infusion yesterday.  Pt noted to have no nausea, vomiting, abdominal pain, no vaginal bleeding, no dysuria.  GEN - NAD; well appearing; A+O x3; non-toxic appearing; CARD -s1s2, RRR, no M,G,R; PULM - CTA b/l, symmetric breath sounds; ABD -  +BS, ND, NT, soft, no guarding, no rebound, no masses; BACK - no CVA tenderness, Normal  spine; EXT - symmetric pulses, 2+ dp, capillary refill < 2 seconds, no cyanosis, no edema; NEURO - no focal neuro deficits, no slurred speech    Plan: Pending repeat cbc after 2 units prbc, will repeat FHR.  If appropriate rise will likely discharge home.

## 2020-02-27 NOTE — ED CDU PROVIDER DISPOSITION NOTE - ATTENDING CONTRIBUTION TO CARE
Isai: I have seen and examined the patient face to face, have reviewed and addended the HPI, PE and a/p as necessary.

## 2020-02-27 NOTE — ED CDU PROVIDER DISPOSITION NOTE - CLINICAL COURSE
Detail Level: Zone 30 y/o F PMHx anemia  currently 14 weeks pregnant here w/ intermittent palpitations and shortness of breath x 3 weeks. Noted to be anemic at with hgb of 7.2. Noted to have received 2 units PRBC with no acute issues.  Pt reports generalized fatigue.  Received Iron infusion yesterday.  Pt noted to have no nausea, vomiting, abdominal pain, no vaginal bleeding, no dysuria. Patient received 2 units PRBC noted to have repeat Hgb 8.0 and 8.1.  Discussed with OB noted to be cleared for discharge.

## 2020-02-27 NOTE — ED CDU PROVIDER SUBSEQUENT DAY NOTE - PROGRESS NOTE DETAILS
Patient seen and examined at bedside; noted to have received 2 units PRBC with no acute issues.  Pt reports generalized fatigue.  Received Iron infusion yesterday.  Pt noted to have no nausea, vomiting, abdominal pain.  GEN - NAD; well appearing; A+O x3; non-toxic appearing; CARD -s1s2, RRR, no M,G,R; PULM - CTA b/l, symmetric breath sounds; ABD -  +BS, ND, NT, soft, no guarding, no rebound, no masses; BACK - no CVA tenderness, Normal  spine; EXT - symmetric pulses, 2+ dp, capillary refill < 2 seconds, no cyanosis, no edema; NEURO - no focal neuro deficits, no slurred speech repeat CBC 8, 8.1. Patient reassessed, sitting comfortably in chair in NAD, denies any complaints. States feeling better, symptoms improved. spoke w/ GYN, okay for discharge if patient is stable and no other symptoms, repeat  BPM. Pt is medically stable for discharge and follow up with PMD and GYN. The patient was given verbal and written discharge instructions. Specifically, instructions when to return to the ED and when to seek follow-up from their pcp was discussed. Any specialty follow-up was discussed, including how to make an appointment.  Instructions were discussed in simple, plain language and was understood by the patient. The patient understands that should their symptoms worsen or any new symptoms arise, they should return to the ED immediately for further evaluation. All pt's questions were answered. Patient verbalizes understanding.

## 2020-02-27 NOTE — ED CDU PROVIDER SUBSEQUENT DAY NOTE - PHYSICAL EXAMINATION
Vital signs reviewed.   CONSTITUTIONAL: Well-appearing; well-nourished; in no apparent distress. Non-toxic appearing.   HEAD: Normocephalic, atraumatic.  EYES: PERRL, EOM intact, conjunctiva and sclera WNL.  ENT: normal nose; no rhinorrhea; .  CARD: Normal S1, S2; no murmurs, rubs, or gallops noted.  RESP: Normal chest excursion with respiration; breath sounds clear and equal bilaterally; no wheezes, rhonchi, or rales.  EXT/MS: moves all extremities  SKIN: Normal for age and race;  NEURO: Awake, alert, oriented x 3, no gross deficits  PSYCH: Normal mood; appropriate affect.

## 2020-02-27 NOTE — ED CDU PROVIDER DISPOSITION NOTE - NSFOLLOWUPINSTRUCTIONS_ED_ALL_ED_FT
Follow up with your PMD within 48-72 hrs. Follow up with your OBGYN within 1-2 days or call our clinic  [phone]. Rest, increase your fluids. No heavy lifting. Worsening pain, vaginal bleeding, vomiting, dizziness, weakness or ANY NEW CONCERNING SYMPTOMS return to the emergency department.

## 2020-02-27 NOTE — ED CDU PROVIDER SUBSEQUENT DAY NOTE - HISTORY
Refer to initial CDU note- " 28 y/o F PMHx anemia  currently 14 weeks pregnant here w/ intermittent palpitations and shortness of breath x 3 weeks. Sx begin randomly, even while at rest. On review, patient's Hg found to be low". Pt has hx of anemia, recently started Iron transfusion. Hgb in ED noted to be 7.2. OB/GYN was consulted and agree with plan for transfusion. The patient admitted to CDU for transfusion repeat labs and observation.      In interim- Patient resting comfortably, no complaints noted. Blood transfusion in process. Ob/gyn following. Will repeat CBC this am. Will continue to monitor closely.

## 2020-02-28 ENCOUNTER — APPOINTMENT (OUTPATIENT)
Dept: INFUSION THERAPY | Facility: HOSPITAL | Age: 30
End: 2020-02-28

## 2020-03-02 ENCOUNTER — NON-APPOINTMENT (OUTPATIENT)
Age: 30
End: 2020-03-02

## 2020-03-02 ENCOUNTER — APPOINTMENT (OUTPATIENT)
Dept: OBGYN | Facility: CLINIC | Age: 30
End: 2020-03-02
Payer: COMMERCIAL

## 2020-03-02 VITALS
WEIGHT: 243 LBS | HEIGHT: 62 IN | BODY MASS INDEX: 44.72 KG/M2 | DIASTOLIC BLOOD PRESSURE: 72 MMHG | SYSTOLIC BLOOD PRESSURE: 122 MMHG

## 2020-03-02 LAB
BILIRUB UR QL STRIP: NORMAL
GLUCOSE UR-MCNC: NORMAL
HCG UR QL: 0.2 EU/DL
HGB UR QL STRIP.AUTO: NORMAL
KETONES UR-MCNC: NORMAL
LEUKOCYTE ESTERASE UR QL STRIP: NORMAL
NITRITE UR QL STRIP: NORMAL
PH UR STRIP: 6
PROT UR STRIP-MCNC: NORMAL
SP GR UR STRIP: 1.03

## 2020-03-02 PROCEDURE — 0502F SUBSEQUENT PRENATAL CARE: CPT

## 2020-03-03 ENCOUNTER — NON-APPOINTMENT (OUTPATIENT)
Age: 30
End: 2020-03-03

## 2020-03-03 ENCOUNTER — APPOINTMENT (OUTPATIENT)
Dept: OBGYN | Facility: CLINIC | Age: 30
End: 2020-03-03
Payer: COMMERCIAL

## 2020-03-03 VITALS
BODY MASS INDEX: 44.72 KG/M2 | WEIGHT: 243 LBS | HEIGHT: 62 IN | SYSTOLIC BLOOD PRESSURE: 148 MMHG | DIASTOLIC BLOOD PRESSURE: 78 MMHG

## 2020-03-03 LAB
BILIRUB UR QL STRIP: NORMAL
GLUCOSE UR-MCNC: NORMAL
HCG UR QL: 0.2 EU/DL
HGB UR QL STRIP.AUTO: NORMAL
KETONES UR-MCNC: NORMAL
LEUKOCYTE ESTERASE UR QL STRIP: NORMAL
NITRITE UR QL STRIP: NORMAL
PH UR STRIP: 5.5
PROT UR STRIP-MCNC: NORMAL
SP GR UR STRIP: 1.03

## 2020-03-03 PROCEDURE — 76801 OB US < 14 WKS SINGLE FETUS: CPT

## 2020-03-03 PROCEDURE — 0502F SUBSEQUENT PRENATAL CARE: CPT

## 2020-03-04 ENCOUNTER — ASOB RESULT (OUTPATIENT)
Age: 30
End: 2020-03-04

## 2020-03-04 ENCOUNTER — APPOINTMENT (OUTPATIENT)
Dept: MATERNAL FETAL MEDICINE | Facility: CLINIC | Age: 30
End: 2020-03-04
Payer: COMMERCIAL

## 2020-03-04 ENCOUNTER — APPOINTMENT (OUTPATIENT)
Dept: ANTEPARTUM | Facility: CLINIC | Age: 30
End: 2020-03-04
Payer: COMMERCIAL

## 2020-03-04 PROCEDURE — 99201 OFFICE OUTPATIENT NEW 10 MINUTES: CPT | Mod: 25

## 2020-03-04 PROCEDURE — 76801 OB US < 14 WKS SINGLE FETUS: CPT

## 2020-03-05 ENCOUNTER — APPOINTMENT (OUTPATIENT)
Dept: ANTEPARTUM | Facility: CLINIC | Age: 30
End: 2020-03-05

## 2020-03-06 ENCOUNTER — APPOINTMENT (OUTPATIENT)
Dept: INFUSION THERAPY | Facility: HOSPITAL | Age: 30
End: 2020-03-06

## 2020-03-06 ENCOUNTER — EMERGENCY (EMERGENCY)
Facility: HOSPITAL | Age: 30
LOS: 1 days | Discharge: HOME | End: 2020-03-06
Attending: EMERGENCY MEDICINE | Admitting: EMERGENCY MEDICINE
Payer: COMMERCIAL

## 2020-03-06 VITALS
OXYGEN SATURATION: 98 % | SYSTOLIC BLOOD PRESSURE: 139 MMHG | RESPIRATION RATE: 20 BRPM | TEMPERATURE: 98 F | HEART RATE: 112 BPM | DIASTOLIC BLOOD PRESSURE: 87 MMHG

## 2020-03-06 PROCEDURE — 99284 EMERGENCY DEPT VISIT MOD MDM: CPT

## 2020-03-06 NOTE — ED ADULT TRIAGE NOTE - CHIEF COMPLAINT QUOTE
"I am 15 weeks pregnant, 16 weeks on Monday. Due day 8/31. SOLANGE burton starting to have vaginal bleeding tonight, just now. denies pain" spoke with L/D triage RN. per L/D triage pt 14 weeks and 4 days to stay in ED.

## 2020-03-07 VITALS
RESPIRATION RATE: 18 BRPM | DIASTOLIC BLOOD PRESSURE: 66 MMHG | OXYGEN SATURATION: 98 % | HEART RATE: 98 BPM | SYSTOLIC BLOOD PRESSURE: 128 MMHG | TEMPERATURE: 98 F

## 2020-03-07 DIAGNOSIS — Z98.891 HISTORY OF UTERINE SCAR FROM PREVIOUS SURGERY: Chronic | ICD-10-CM

## 2020-03-07 DIAGNOSIS — N93.9 ABNORMAL UTERINE AND VAGINAL BLEEDING, UNSPECIFIED: ICD-10-CM

## 2020-03-07 LAB
ALBUMIN SERPL ELPH-MCNC: 3.4 G/DL — SIGNIFICANT CHANGE UP (ref 3.3–5)
ALP SERPL-CCNC: 45 U/L — SIGNIFICANT CHANGE UP (ref 40–120)
ALT FLD-CCNC: 15 U/L — SIGNIFICANT CHANGE UP (ref 4–33)
ANION GAP SERPL CALC-SCNC: 15 MMO/L — HIGH (ref 7–14)
ANISOCYTOSIS BLD QL: SIGNIFICANT CHANGE UP
AST SERPL-CCNC: 22 U/L — SIGNIFICANT CHANGE UP (ref 4–32)
BASOPHILS # BLD AUTO: 0.04 K/UL — SIGNIFICANT CHANGE UP (ref 0–0.2)
BASOPHILS NFR BLD AUTO: 0.3 % — SIGNIFICANT CHANGE UP (ref 0–2)
BASOPHILS NFR SPEC: 0.9 % — SIGNIFICANT CHANGE UP (ref 0–2)
BILIRUB SERPL-MCNC: < 0.2 MG/DL — LOW (ref 0.2–1.2)
BLASTS # FLD: 0 % — SIGNIFICANT CHANGE UP (ref 0–0)
BLD GP AB SCN SERPL QL: NEGATIVE — SIGNIFICANT CHANGE UP
BUN SERPL-MCNC: 5 MG/DL — LOW (ref 7–23)
CALCIUM SERPL-MCNC: 9.4 MG/DL — SIGNIFICANT CHANGE UP (ref 8.4–10.5)
CHLORIDE SERPL-SCNC: 104 MMOL/L — SIGNIFICANT CHANGE UP (ref 98–107)
CO2 SERPL-SCNC: 17 MMOL/L — LOW (ref 22–31)
CREAT SERPL-MCNC: 0.48 MG/DL — LOW (ref 0.5–1.3)
DACRYOCYTES BLD QL SMEAR: SLIGHT — SIGNIFICANT CHANGE UP
EOSINOPHIL # BLD AUTO: 0.13 K/UL — SIGNIFICANT CHANGE UP (ref 0–0.5)
EOSINOPHIL NFR BLD AUTO: 1 % — SIGNIFICANT CHANGE UP (ref 0–6)
EOSINOPHIL NFR FLD: 0 % — SIGNIFICANT CHANGE UP (ref 0–6)
GIANT PLATELETS BLD QL SMEAR: PRESENT — SIGNIFICANT CHANGE UP
GLUCOSE SERPL-MCNC: 87 MG/DL — SIGNIFICANT CHANGE UP (ref 70–99)
HCG SERPL-ACNC: SIGNIFICANT CHANGE UP MIU/ML
HCT VFR BLD CALC: 31 % — LOW (ref 34.5–45)
HGB BLD-MCNC: 9.1 G/DL — LOW (ref 11.5–15.5)
IMM GRANULOCYTES NFR BLD AUTO: 0.4 % — SIGNIFICANT CHANGE UP (ref 0–1.5)
LYMPHOCYTES # BLD AUTO: 24.3 % — SIGNIFICANT CHANGE UP (ref 13–44)
LYMPHOCYTES # BLD AUTO: 3.31 K/UL — HIGH (ref 1–3.3)
LYMPHOCYTES NFR SPEC AUTO: 20.9 % — SIGNIFICANT CHANGE UP (ref 13–44)
MCHC RBC-ENTMCNC: 20.4 PG — LOW (ref 27–34)
MCHC RBC-ENTMCNC: 29.4 % — LOW (ref 32–36)
MCV RBC AUTO: 69.7 FL — LOW (ref 80–100)
METAMYELOCYTES # FLD: 0 % — SIGNIFICANT CHANGE UP (ref 0–1)
MICROCYTES BLD QL: SIGNIFICANT CHANGE UP
MONOCYTES # BLD AUTO: 0.72 K/UL — SIGNIFICANT CHANGE UP (ref 0–0.9)
MONOCYTES NFR BLD AUTO: 5.3 % — SIGNIFICANT CHANGE UP (ref 2–14)
MONOCYTES NFR BLD: 5.2 % — SIGNIFICANT CHANGE UP (ref 2–9)
MYELOCYTES NFR BLD: 0 % — SIGNIFICANT CHANGE UP (ref 0–0)
NEUTROPHIL AB SER-ACNC: 71.3 % — SIGNIFICANT CHANGE UP (ref 43–77)
NEUTROPHILS # BLD AUTO: 9.34 K/UL — HIGH (ref 1.8–7.4)
NEUTROPHILS NFR BLD AUTO: 68.7 % — SIGNIFICANT CHANGE UP (ref 43–77)
NEUTS BAND # BLD: 0 % — SIGNIFICANT CHANGE UP (ref 0–6)
NRBC # FLD: 0 K/UL — SIGNIFICANT CHANGE UP (ref 0–0)
OTHER - HEMATOLOGY %: 0 — SIGNIFICANT CHANGE UP
OVALOCYTES BLD QL SMEAR: SIGNIFICANT CHANGE UP
PLATELET # BLD AUTO: 317 K/UL — SIGNIFICANT CHANGE UP (ref 150–400)
PLATELET COUNT - ESTIMATE: NORMAL — SIGNIFICANT CHANGE UP
PMV BLD: 11 FL — SIGNIFICANT CHANGE UP (ref 7–13)
POIKILOCYTOSIS BLD QL AUTO: SLIGHT — SIGNIFICANT CHANGE UP
POLYCHROMASIA BLD QL SMEAR: SLIGHT — SIGNIFICANT CHANGE UP
POTASSIUM SERPL-MCNC: 4 MMOL/L — SIGNIFICANT CHANGE UP (ref 3.5–5.3)
POTASSIUM SERPL-SCNC: 4 MMOL/L — SIGNIFICANT CHANGE UP (ref 3.5–5.3)
PROMYELOCYTES # FLD: 0 % — SIGNIFICANT CHANGE UP (ref 0–0)
PROT SERPL-MCNC: 7.2 G/DL — SIGNIFICANT CHANGE UP (ref 6–8.3)
RBC # BLD: 4.45 M/UL — SIGNIFICANT CHANGE UP (ref 3.8–5.2)
RBC # FLD: 28.5 % — HIGH (ref 10.3–14.5)
RH IG SCN BLD-IMP: POSITIVE — SIGNIFICANT CHANGE UP
SCHISTOCYTES BLD QL AUTO: SIGNIFICANT CHANGE UP
SODIUM SERPL-SCNC: 136 MMOL/L — SIGNIFICANT CHANGE UP (ref 135–145)
VARIANT LYMPHS # BLD: 1.7 % — SIGNIFICANT CHANGE UP
WBC # BLD: 13.6 K/UL — HIGH (ref 3.8–10.5)
WBC # FLD AUTO: 13.6 K/UL — HIGH (ref 3.8–10.5)

## 2020-03-07 PROCEDURE — 76805 OB US >/= 14 WKS SNGL FETUS: CPT | Mod: 26

## 2020-03-07 PROCEDURE — 76817 TRANSVAGINAL US OBSTETRIC: CPT | Mod: 26

## 2020-03-07 NOTE — ED PROVIDER NOTE - PATIENT PORTAL LINK FT
You can access the FollowMyHealth Patient Portal offered by [facility] by registering at the following website: [email or web address]. By joining [employer]’s FollowMyHealth portal, you will also be able to view your health information using other applications (apps) compatible with our system.

## 2020-03-07 NOTE — ED ADULT NURSE NOTE - OBJECTIVE STATEMENT
pt received in room 5, A&Ox3 c/o vaginal bleeding. States second pregnancy, started having heavy vaginal bleeding starting this afternoon with clots. States she is approx 14 weeks pregnant. Denies pain, N/V/D, fevers, weakness or lightheadedness. Pt "unsure of how much blood" since first encounter. Awaiting MD evaluation. Resp even and unlabored. Does report she needed a blood transfusion last week for low iron. VS as noted. Will continue to monitor.

## 2020-03-07 NOTE — CONSULT NOTE ADULT - SUBJECTIVE AND OBJECTIVE BOX
R2 GYN ED CONSULT NOTE    SUBJECTIVE:    28yo  @ 14.5wks presenting w/ vaginal bleeding. Patient said that she had some spotting a few weeks ago which stopped. Then she felt a large movement from the baby today with some spotting and a small clot. She said that the bleeding is similar to a light period. She denies feeling Ctx/LOF. She said that she received a blood transfusion last week for symptomatic anemia. PNC otherwise uncomplicated.     Pt denies fever, chills, nausea, vomiting, diarrhea, headache, constipation, dizzyness, syncope, chest pain, palpitations, shortness of breath, dysuria, urgency, frequency, abdominal/pelvic pain, vaginal discharge/odor/pain/itching, breast lumps/bumps, dyspareunia.    Primary OB/GYN: [facility]  OBH:1x c/s, 1x SAB  GYNH: denies hx  abnl paps, sti, +1 fibroid about the size of an orange she says, unsure of location, + ovarian cysts  PMSH: Denies  MEDS: Fe, PNV  ALL: nkda  SOCH: denies t/e/d; safe at home  FAMH: denies fam hx of breast/uterine/ovarian/colon cancer  ROS: negative except per hpi    OBJECTIVE:    VITAL SIGNS:  Vital Signs Last 24 Hrs  T(C): 36.9 (07 Mar 2020 00:36), Max: 36.9 (07 Mar 2020 00:36)  T(F): 98.5 (07 Mar 2020 00:36), Max: 98.5 (07 Mar 2020 00:36)  HR: 98 (07 Mar 2020 00:36) (98 - 112)  BP: 128/66 (07 Mar 2020 00:36) (128/66 - 139/87)  BP(mean): --  RR: 18 (07 Mar 2020 00:36) (18 - 20)  SpO2: 98% (07 Mar 2020 00:36) (98% - 98%)    CAPILLARY BLOOD GLUCOSE        PHYSICAL EXAM:  GEN: NAD, A&Ox3  ABD: soft, NT, ND, Gravid uterus measuring approximately 15wks  SPECULUM: minimal blood in vault, no active bleeding from os, Os appears closed  PELVIC: Os closed, cervix long  EXT: WWP, no edema/TTP    LABS:                        9.1    13.60 )-----------( 317      ( 07 Mar 2020 01:20 )             31.0   baso 0.3    eos 1.0    imm gran 0.4    lymph 24.3   mono 5.3    poly 68.7           136  |  104  |  5<L>  ----------------------------<  87  4.0   |  17<L>  |  0.48<L>    Ca    9.4      07 Mar 2020 01:20    TPro  7.2  /  Alb  3.4  /  TBili  < 0.2<L>  /  DBili  x   /  AST  22  /  ALT  15  /  AlkPhos  45  03-07          RADIOLOGY:    INTERPRETATION:  CLINICAL INFORMATION: Pregnant, vaginal bleeding.    LMP: 11/15/2019    Estimated Gestational Age by LMP: 16 weeks 1 day.    COMPARISON: None available.    Technique: Endovaginal pelvic sonogram as per order. Transabdominal pelvic sonogram was also performed as part of our standard protocol.    FINDINGS:    Gravid uterus with single intrauterine gestation. An intramural/subserosal myoma measures 6.4 x 5.4 x 6.0 cm.    A single live Intrauterine gestation is present in breech presentation. The placenta is fundal/posterior without previa. Amniotic fluid volume is within normal limits. The cervix is closed..    Fetal motion is seen in real-time and the fetal heart rate measures 160 bpm.    Measurements are as follows:    BPD: 2.96 cm corresponding to 15 weeks 3 days.  HC: 11.9 cm corresponding to 15 weeks 6 days.  AC: 10.12 cm corresponding to 16 weeks 1 days.  FL: 2.26 cm corresponding to 16 weeks 5 days.    Estimated fetal age by ultrasound is 16 weeks 0 days.    IMPRESSION:    Single viable intrauterine gestation with size correlating to dates.    Fibroid uterus.

## 2020-03-07 NOTE — ED PROVIDER NOTE - NSFOLLOWUPINSTRUCTIONS_ED_ALL_ED_FT
Please follow up with Dr. Alonso in the office at your previously scheduled appointment on Monday, March 9, 2020.     Jairon Alonso)  Obstetrics and Gynecology  [address] [place] [address] [address] [place] [address]  Phone: [phone]    Please return to the emergency department if you experience worsening symptoms, or if you develop headache, neck stiffness, fever/chills, changes in vision, chest pain, shortness of breath, difficulty breathing, palpitations, lightheadedness, weakness, dizziness, numbness, tingling, abdominal pain, nausea, vomiting, diarrhea, changes in your urine, blood in the urine, painful urination, syncope (passing out), or for any other concerns.

## 2020-03-07 NOTE — ED PROVIDER NOTE - CLINICAL SUMMARY MEDICAL DECISION MAKING FREE TEXT BOX
28 yo F, , w/ PMH of fibroids, iron-deficiency anemia, presenting to [facility] ED d/t acute onset of vaginal bleeding, associated w/ abdominal cramping, which began 3 hours prior to arrival. No other complaints. No active bleeding and closed cervical os on pelvic exam. Recent history of symptomatic anemia requiring transfusion. Will check labs including cbc, type and screen, and TVUS to assess pregnancy, fibroids. Reassess.

## 2020-03-07 NOTE — CONSULT NOTE ADULT - ASSESSMENT
ASSESSMENT: Pt is a 28YO  @ 14.5wks presenting for vaginal bleeding. Patient's vitals are stable. Labs stable. Minimal vaginal blood noted. no active bleeding from os. Os closed/long.

## 2020-03-07 NOTE — ED PROVIDER NOTE - CARE PROVIDER_API CALL
Jairon Alonso)  Obstetrics and Gynecology  [address], [address]nd [place] [address]  Phone: [phone]  Fax: [phone]  Follow Up Time:

## 2020-03-07 NOTE — ED PROVIDER NOTE - OBJECTIVE STATEMENT
28 yo  F, LMP 2019 at ~15 wks gestation (NIKHIL 20), w/ PMH of fibroids, iron-deficiency anemia, accompanied by significant other, presenting to [facility] ED d/t heavy vaginal bleeding, which began 3 hours ago, and associated w/ abdominal cramping sensation. Patient unsure if still bleeding. Patient states that she was seen in ED ~2 weeks ago for lightheadedness, fatigue, palpitations and SOB, and she was found to be anemic requiring a blood transfusion. Patient currently denies dizziness, lightheadedness, cp, sob, or any other complaints.     Name of GYN Physician: Dr. Alonso   OBhx: C/Sx1 (), mTOPx1   Gynhx: h/o fibroids, resolved ovarian cysts, ?Abnl Pap smears last year  PMhx: Anemia   PSHx: C/S x1 ()    Social: denies tobacco, alcohol, recreational drugs   Meds: ASA, s/p Fe transfusion (20)   Allergies: NKDA 30 yo  F, LMP 2019 at ~15 wks gestation (NIKHIL 20), w/ PMH of fibroids, iron-deficiency anemia, accompanied by significant other, presenting to [facility] ED d/t heavy vaginal bleeding, which began 3 hours ago, and associated w/ abdominal cramping sensation. Patient unsure if still bleeding. Patient states that she was seen in ED ~2 weeks ago for lightheadedness, fatigue, palpitations and SOB, and she was found to be anemic requiring a blood transfusion. Patient currently denies dizziness, lightheadedness, cp, sob, or any other complaints.     Name of GYN Physician: Dr. Alonso   OBhx: C/Sx1 (), mTOPx1   Gynhx: h/o fibroids, resolved ovarian cysts, ?Abnl Pap smears last year  PMhx: Anemia   PSHx: C/S x1 ()    Social: denies tobacco, alcohol, recreational drugs   Meds: ASA, s/p Fe transfusion (20)   Allergies: NKDA    Attendinyo female who is about 15 weeks pregnant presents with vaginal bleeding this evening.  no pain.  no cramping.  no lightheadedness.  pt does have a fibroid and anemia and had a blood transfusion about 2 weeks ago.

## 2020-03-07 NOTE — ED PROVIDER NOTE - GENITOURINARY, MLM
No discharge, lesions. Mild amount of blood in vagina. Cervical os closed. No active bleeding. No clots. Chaperoned by CONNOR Hendrix.

## 2020-03-07 NOTE — CONSULT NOTE ADULT - PROBLEM SELECTOR RECOMMENDATION 9
RECOMMENDATIONS:  -CBC, T&S, coags  -TVUS: Single viable IUP  -RH+ no need for rhogam  -Cervix long/closed  -Has f/u in office on Monday.   -No acute intervention needed     Jered Galeana PGY-2 d/w Dr. Alonso

## 2020-03-09 ENCOUNTER — APPOINTMENT (OUTPATIENT)
Dept: OBGYN | Facility: CLINIC | Age: 30
End: 2020-03-09
Payer: COMMERCIAL

## 2020-03-09 ENCOUNTER — NON-APPOINTMENT (OUTPATIENT)
Age: 30
End: 2020-03-09

## 2020-03-09 VITALS
SYSTOLIC BLOOD PRESSURE: 112 MMHG | WEIGHT: 246 LBS | BODY MASS INDEX: 45.27 KG/M2 | DIASTOLIC BLOOD PRESSURE: 62 MMHG | HEIGHT: 62 IN

## 2020-03-09 LAB
BILIRUB UR QL STRIP: NORMAL
GLUCOSE UR-MCNC: NORMAL
HCG UR QL: 0.2 EU/DL
HGB UR QL STRIP.AUTO: NORMAL
KETONES UR-MCNC: NORMAL
LEUKOCYTE ESTERASE UR QL STRIP: NORMAL
NITRITE UR QL STRIP: NORMAL
PH UR STRIP: 7
PROT UR STRIP-MCNC: 30
SP GR UR STRIP: 1.02

## 2020-03-09 PROCEDURE — 0502F SUBSEQUENT PRENATAL CARE: CPT

## 2020-03-09 PROCEDURE — 36415 COLL VENOUS BLD VENIPUNCTURE: CPT

## 2020-03-11 ENCOUNTER — APPOINTMENT (OUTPATIENT)
Dept: INFUSION THERAPY | Facility: HOSPITAL | Age: 30
End: 2020-03-11

## 2020-03-11 LAB — BACTERIA UR CULT: NORMAL

## 2020-03-13 ENCOUNTER — OUTPATIENT (OUTPATIENT)
Dept: OUTPATIENT SERVICES | Facility: HOSPITAL | Age: 30
LOS: 1 days | Discharge: HOME | End: 2020-03-13

## 2020-03-13 ENCOUNTER — APPOINTMENT (OUTPATIENT)
Dept: INFUSION THERAPY | Facility: HOSPITAL | Age: 30
End: 2020-03-13

## 2020-03-13 DIAGNOSIS — Z98.891 HISTORY OF UTERINE SCAR FROM PREVIOUS SURGERY: Chronic | ICD-10-CM

## 2020-03-13 DIAGNOSIS — D64.9 ANEMIA, UNSPECIFIED: ICD-10-CM

## 2020-03-13 LAB
2ND TRIMESTER DATA: NORMAL
AFP PNL SERPL: NORMAL
AFP SERPL-ACNC: NORMAL
CLINICAL BIOCHEMIST REVIEW: NORMAL
NOTES NTD: NORMAL

## 2020-03-16 ENCOUNTER — APPOINTMENT (OUTPATIENT)
Dept: INFUSION THERAPY | Facility: HOSPITAL | Age: 30
End: 2020-03-16

## 2020-03-16 ENCOUNTER — OUTPATIENT (OUTPATIENT)
Dept: INPATIENT UNIT | Facility: HOSPITAL | Age: 30
LOS: 1 days | Discharge: HOME | End: 2020-03-16
Payer: COMMERCIAL

## 2020-03-16 VITALS — SYSTOLIC BLOOD PRESSURE: 124 MMHG | DIASTOLIC BLOOD PRESSURE: 65 MMHG | HEART RATE: 96 BPM

## 2020-03-16 VITALS
HEART RATE: 96 BPM | TEMPERATURE: 99 F | SYSTOLIC BLOOD PRESSURE: 124 MMHG | RESPIRATION RATE: 16 BRPM | DIASTOLIC BLOOD PRESSURE: 65 MMHG

## 2020-03-16 DIAGNOSIS — Z98.891 HISTORY OF UTERINE SCAR FROM PREVIOUS SURGERY: Chronic | ICD-10-CM

## 2020-03-16 DIAGNOSIS — O26.899 OTHER SPECIFIED PREGNANCY RELATED CONDITIONS, UNSPECIFIED TRIMESTER: ICD-10-CM

## 2020-03-16 DIAGNOSIS — Z3A.00 WEEKS OF GESTATION OF PREGNANCY NOT SPECIFIED: ICD-10-CM

## 2020-03-16 PROCEDURE — 99213 OFFICE O/P EST LOW 20 MIN: CPT | Mod: 25

## 2020-03-16 PROCEDURE — 76815 OB US LIMITED FETUS(S): CPT | Mod: 26

## 2020-03-16 NOTE — OB PROVIDER TRIAGE NOTE - ADDITIONAL INSTRUCTIONS
maternal and fetal status reassuring  d/w dr rivers  discharge home  increase fluid intake  follow up with dr ubrton as sched  w/v discharge instructions given  discharged home

## 2020-03-16 NOTE — OB PROVIDER TRIAGE NOTE - NSOBPROVIDERNOTE_OBGYN_ALL_OB_FT
30 y/o  woman  @ 16 wks gest presents with c/o decrease FM since yesterday denies any uc's vb or lof denies any n/v/d denies any fever or chills ap care comp by:   iron deficiency anemia-   recd blood transfusion 2 wks ago  receives bi weekly iron transfusions       abdomen: soft, nt on palp  TAS: +  bpm  AAFI  T(C): 37.3 (20 @ 08:39), Max: 37.3 (20 @ 08:39)  HR: 96 (20 @ 08:40) (96 - 96)  BP: 124/65 (20 @ 08:40) (124/65 - 124/65)  RR: 16 (20 @ 08:39) (16 - 16)  SpO2: --      NKA  med hx:  iron deficiency anemia  surg hx:  D&C x's 1  2007 primary  arrest of dilitation 8#  gyn hx: denies  ob hx:   ETOP x's 1  2007 primary  arrest of dilitation 8#  meds:  PNV  baby ASA       maternal and fetal status reassuring  d/w dr rivers  discharge home  increase fluid intake  follow up with dr burton as sched  w/v discharge instructions given  discharged home

## 2020-03-19 ENCOUNTER — LABORATORY RESULT (OUTPATIENT)
Age: 30
End: 2020-03-19

## 2020-03-31 ENCOUNTER — APPOINTMENT (OUTPATIENT)
Dept: MATERNAL FETAL MEDICINE | Facility: CLINIC | Age: 30
End: 2020-03-31

## 2020-03-31 ENCOUNTER — NON-APPOINTMENT (OUTPATIENT)
Age: 30
End: 2020-03-31

## 2020-03-31 ENCOUNTER — APPOINTMENT (OUTPATIENT)
Dept: MATERNAL FETAL MEDICINE | Facility: CLINIC | Age: 30
End: 2020-03-31
Payer: COMMERCIAL

## 2020-03-31 ENCOUNTER — ASOB RESULT (OUTPATIENT)
Age: 30
End: 2020-03-31

## 2020-03-31 ENCOUNTER — APPOINTMENT (OUTPATIENT)
Dept: OBGYN | Facility: CLINIC | Age: 30
End: 2020-03-31
Payer: COMMERCIAL

## 2020-03-31 VITALS
BODY MASS INDEX: 46.01 KG/M2 | SYSTOLIC BLOOD PRESSURE: 140 MMHG | HEIGHT: 62 IN | DIASTOLIC BLOOD PRESSURE: 80 MMHG | WEIGHT: 250 LBS

## 2020-03-31 DIAGNOSIS — Z83.3 FAMILY HISTORY OF DIABETES MELLITUS: ICD-10-CM

## 2020-03-31 PROCEDURE — XXXXX: CPT

## 2020-03-31 PROCEDURE — 0502F SUBSEQUENT PRENATAL CARE: CPT

## 2020-04-09 ENCOUNTER — OUTPATIENT (OUTPATIENT)
Dept: OUTPATIENT SERVICES | Facility: HOSPITAL | Age: 30
LOS: 1 days | Discharge: HOME | End: 2020-04-09

## 2020-04-09 DIAGNOSIS — Z98.891 HISTORY OF UTERINE SCAR FROM PREVIOUS SURGERY: Chronic | ICD-10-CM

## 2020-04-09 DIAGNOSIS — D64.9 ANEMIA, UNSPECIFIED: ICD-10-CM

## 2020-04-14 ENCOUNTER — APPOINTMENT (OUTPATIENT)
Dept: DISASTER EMERGENCY | Facility: CLINIC | Age: 30
End: 2020-04-14

## 2020-04-14 ENCOUNTER — ASOB RESULT (OUTPATIENT)
Age: 30
End: 2020-04-14

## 2020-04-14 ENCOUNTER — APPOINTMENT (OUTPATIENT)
Dept: ANTEPARTUM | Facility: CLINIC | Age: 30
End: 2020-04-14
Payer: COMMERCIAL

## 2020-04-14 PROCEDURE — 76817 TRANSVAGINAL US OBSTETRIC: CPT

## 2020-04-14 PROCEDURE — 76811 OB US DETAILED SNGL FETUS: CPT

## 2020-04-16 ENCOUNTER — APPOINTMENT (OUTPATIENT)
Dept: HEMATOLOGY ONCOLOGY | Facility: CLINIC | Age: 30
End: 2020-04-16
Payer: COMMERCIAL

## 2020-04-16 PROCEDURE — 99204 OFFICE O/P NEW MOD 45 MIN: CPT | Mod: 95

## 2020-04-16 PROCEDURE — 99214 OFFICE O/P EST MOD 30 MIN: CPT | Mod: 95

## 2020-04-16 PROCEDURE — 99212 OFFICE O/P EST SF 10 MIN: CPT | Mod: 95

## 2020-04-16 NOTE — ASSESSMENT
[FreeTextEntry1] : 28 yo woman with 12 weeks pregnancy, referred for evaluation and management of iron deficiency anemia \par Labs 2/4/2020: WBC 11.97, Hb 7.5 g/dl, Hct 29.1%, MCV 65.5%, RDW 24.1%, . \par \par Patient feels better after iron infusions. \par \par Patient is 20 weeks pregnant, no complications. Patient was treated with Venofer  200 mg  x 3 was unable to finished them. Patient is not taking oral  iron supplements. Will repeat labs to evaluate iron levels. \par \par This service was provided by using telehealth. The patient was at home and I was at [facility]. The patient requested and participated in this encounter. The encounter face to face last 30   minutes coordinating his/her care and counseling.\par \par \par RTC 2 months.

## 2020-04-16 NOTE — REVIEW OF SYSTEMS
[Negative] : Neurological [Fatigue] : no fatigue [Palpitations] : no palpitations [SOB on Exertion] : no shortness of breath during exertion [Dizziness] : no dizziness [Easy Bleeding] : no tendency for easy bleeding

## 2020-04-16 NOTE — HISTORY OF PRESENT ILLNESS
[0 - No Distress] : Distress Level: 0 [Home] : at home, [unfilled] , at the time of the visit. [Medical Office: ([facility])___] : at the medical office located in  [Family Member] : family member [Patient] : the patient [Self] : self [FreeTextEntry2] : Holli Grace [FreeTextEntry4] : relative [de-identified] : 30 yo woman with 12 weeks pregnancy, referred for evaluation and management of anemia of pregnancy.\par \par Patient feels fatigue, tired, lightheadedness, dizziness, positive for palpitations, no chest pain, denies melena or hematochezia. Denies prior history of anemia, this is her second pregnancy, no history of anemia with first pregnancy. \par \par Labs 2/4/2020: WBC 11.97, Hb 7.5 g/dl, Hct 29.1%, MCV 65.5%, RDW 24.1%, . \par \par  [de-identified] : Patient is 20 weeks pregnant, no complications. Patient was treated with Venofer  200 mg  x 3 was unable to finished them. Patient is not taking oral  iron supplements. \par \par Patient is feeling better, more energy.\par \par No other changes in medical, surgical or social history since 2/13/2020. \par

## 2020-04-18 ENCOUNTER — APPOINTMENT (OUTPATIENT)
Dept: ANTEPARTUM | Facility: CLINIC | Age: 30
End: 2020-04-18

## 2020-04-26 ENCOUNTER — MESSAGE (OUTPATIENT)
Age: 30
End: 2020-04-26

## 2020-04-27 ENCOUNTER — NON-APPOINTMENT (OUTPATIENT)
Age: 30
End: 2020-04-27

## 2020-04-27 ENCOUNTER — APPOINTMENT (OUTPATIENT)
Dept: OBGYN | Facility: CLINIC | Age: 30
End: 2020-04-27
Payer: COMMERCIAL

## 2020-04-27 VITALS
WEIGHT: 248 LBS | BODY MASS INDEX: 45.64 KG/M2 | SYSTOLIC BLOOD PRESSURE: 126 MMHG | HEIGHT: 62 IN | DIASTOLIC BLOOD PRESSURE: 70 MMHG

## 2020-04-27 LAB
BILIRUB UR QL STRIP: NORMAL
GLUCOSE UR-MCNC: NORMAL
HCG UR QL: 0.2 EU/DL
HGB UR QL STRIP.AUTO: NORMAL
KETONES UR-MCNC: NORMAL
LEUKOCYTE ESTERASE UR QL STRIP: NORMAL
NITRITE UR QL STRIP: NORMAL
PH UR STRIP: 5.5
PROT UR STRIP-MCNC: NORMAL
SP GR UR STRIP: 1.02

## 2020-04-27 PROCEDURE — 0502F SUBSEQUENT PRENATAL CARE: CPT

## 2020-04-29 ENCOUNTER — ASOB RESULT (OUTPATIENT)
Age: 30
End: 2020-04-29

## 2020-04-29 ENCOUNTER — APPOINTMENT (OUTPATIENT)
Dept: MATERNAL FETAL MEDICINE | Facility: CLINIC | Age: 30
End: 2020-04-29
Payer: COMMERCIAL

## 2020-04-29 PROCEDURE — XXXXX: CPT

## 2020-04-30 LAB
SARS-COV-2 IGG SERPL IA-ACNC: <0.1 INDEX
SARS-COV-2 IGG SERPL QL IA: NEGATIVE

## 2020-05-18 ENCOUNTER — APPOINTMENT (OUTPATIENT)
Dept: DISASTER EMERGENCY | Facility: CLINIC | Age: 30
End: 2020-05-18

## 2020-06-01 ENCOUNTER — APPOINTMENT (OUTPATIENT)
Dept: OBGYN | Facility: CLINIC | Age: 30
End: 2020-06-01
Payer: COMMERCIAL

## 2020-06-01 ENCOUNTER — NON-APPOINTMENT (OUTPATIENT)
Age: 30
End: 2020-06-01

## 2020-06-01 VITALS
WEIGHT: 250 LBS | DIASTOLIC BLOOD PRESSURE: 64 MMHG | SYSTOLIC BLOOD PRESSURE: 110 MMHG | BODY MASS INDEX: 46.01 KG/M2 | HEIGHT: 62 IN

## 2020-06-01 PROCEDURE — 36415 COLL VENOUS BLD VENIPUNCTURE: CPT

## 2020-06-01 PROCEDURE — 0502F SUBSEQUENT PRENATAL CARE: CPT

## 2020-06-02 LAB
BASOPHILS # BLD AUTO: 0.03 K/UL
BASOPHILS NFR BLD AUTO: 0.3 %
EOSINOPHIL # BLD AUTO: 0.08 K/UL
EOSINOPHIL NFR BLD AUTO: 0.8 %
GLUCOSE 1H P 50 G GLC PO SERPL-MCNC: 163 MG/DL
HCT VFR BLD CALC: 30.8 %
HGB BLD-MCNC: 9.3 G/DL
IMM GRANULOCYTES NFR BLD AUTO: 0.5 %
LYMPHOCYTES # BLD AUTO: 2.28 K/UL
LYMPHOCYTES NFR BLD AUTO: 22.6 %
MAN DIFF?: NORMAL
MCHC RBC-ENTMCNC: 25 PG
MCHC RBC-ENTMCNC: 30.2 GM/DL
MCV RBC AUTO: 82.8 FL
MONOCYTES # BLD AUTO: 0.56 K/UL
MONOCYTES NFR BLD AUTO: 5.5 %
NEUTROPHILS # BLD AUTO: 7.1 K/UL
NEUTROPHILS NFR BLD AUTO: 70.3 %
PLATELET # BLD AUTO: 321 K/UL
RBC # BLD: 3.72 M/UL
RBC # FLD: 18.7 %
T PALLIDUM AB SER QL IA: NEGATIVE
WBC # FLD AUTO: 10.1 K/UL

## 2020-06-10 LAB
GLUCOSE 1H P 100 G GLC PO SERPL-MCNC: 176 MG/DL
GLUCOSE 2H P CHAL SERPL-MCNC: 137 MG/DL
GLUCOSE 3H P CHAL SERPL-MCNC: 137 MG/DL
GLUCOSE BS SERPL-MCNC: 103 MG/DL

## 2020-06-29 ENCOUNTER — APPOINTMENT (OUTPATIENT)
Dept: OBGYN | Facility: CLINIC | Age: 30
End: 2020-06-29
Payer: COMMERCIAL

## 2020-06-29 ENCOUNTER — NON-APPOINTMENT (OUTPATIENT)
Age: 30
End: 2020-06-29

## 2020-06-29 VITALS
WEIGHT: 250 LBS | HEIGHT: 62 IN | BODY MASS INDEX: 46.01 KG/M2 | SYSTOLIC BLOOD PRESSURE: 112 MMHG | DIASTOLIC BLOOD PRESSURE: 68 MMHG

## 2020-06-29 LAB
BILIRUB UR QL STRIP: NORMAL
GLUCOSE UR-MCNC: NORMAL
HCG UR QL: 0.2 EU/DL
HGB UR QL STRIP.AUTO: NORMAL
KETONES UR-MCNC: NORMAL
LEUKOCYTE ESTERASE UR QL STRIP: NORMAL
NITRITE UR QL STRIP: NORMAL
PH UR STRIP: 6.5
PROT UR STRIP-MCNC: NORMAL
SP GR UR STRIP: 1.01

## 2020-06-29 PROCEDURE — 0502F SUBSEQUENT PRENATAL CARE: CPT

## 2020-06-29 PROCEDURE — 90715 TDAP VACCINE 7 YRS/> IM: CPT

## 2020-06-29 PROCEDURE — 76816 OB US FOLLOW-UP PER FETUS: CPT

## 2020-06-29 PROCEDURE — 90471 IMMUNIZATION ADMIN: CPT

## 2020-07-07 ENCOUNTER — OUTPATIENT (OUTPATIENT)
Dept: INPATIENT UNIT | Facility: HOSPITAL | Age: 30
LOS: 1 days | Discharge: HOME | End: 2020-07-07
Payer: COMMERCIAL

## 2020-07-07 ENCOUNTER — EMERGENCY (EMERGENCY)
Facility: HOSPITAL | Age: 30
LOS: 1 days | Discharge: LEFT BEFORE DISCHARGE | End: 2020-07-07
Admitting: EMERGENCY MEDICINE
Payer: COMMERCIAL

## 2020-07-07 VITALS — RESPIRATION RATE: 17 BRPM | TEMPERATURE: 99 F

## 2020-07-07 VITALS — OXYGEN SATURATION: 99 % | HEART RATE: 92 BPM

## 2020-07-07 DIAGNOSIS — Z98.891 HISTORY OF UTERINE SCAR FROM PREVIOUS SURGERY: Chronic | ICD-10-CM

## 2020-07-07 DIAGNOSIS — Z98.890 OTHER SPECIFIED POSTPROCEDURAL STATES: Chronic | ICD-10-CM

## 2020-07-07 DIAGNOSIS — Z3A.00 WEEKS OF GESTATION OF PREGNANCY NOT SPECIFIED: ICD-10-CM

## 2020-07-07 DIAGNOSIS — O26.899 OTHER SPECIFIED PREGNANCY RELATED CONDITIONS, UNSPECIFIED TRIMESTER: ICD-10-CM

## 2020-07-07 LAB
APPEARANCE UR: SIGNIFICANT CHANGE UP
BACTERIA # UR AUTO: HIGH
BILIRUB UR-MCNC: NEGATIVE — SIGNIFICANT CHANGE UP
BLOOD UR QL VISUAL: NEGATIVE — SIGNIFICANT CHANGE UP
COLOR SPEC: YELLOW — SIGNIFICANT CHANGE UP
GLUCOSE UR-MCNC: NEGATIVE — SIGNIFICANT CHANGE UP
HYALINE CASTS # UR AUTO: HIGH
KETONES UR-MCNC: SIGNIFICANT CHANGE UP
LEUKOCYTE ESTERASE UR-ACNC: SIGNIFICANT CHANGE UP
NITRITE UR-MCNC: NEGATIVE — SIGNIFICANT CHANGE UP
PH UR: 6 — SIGNIFICANT CHANGE UP (ref 5–8)
PROT UR-MCNC: 50 — SIGNIFICANT CHANGE UP
RBC CASTS # UR COMP ASSIST: SIGNIFICANT CHANGE UP (ref 0–?)
SP GR SPEC: 1.02 — SIGNIFICANT CHANGE UP (ref 1–1.04)
SQUAMOUS # UR AUTO: SIGNIFICANT CHANGE UP
UROBILINOGEN FLD QL: NORMAL — SIGNIFICANT CHANGE UP
WBC UR QL: HIGH (ref 0–?)

## 2020-07-07 PROCEDURE — 76830 TRANSVAGINAL US NON-OB: CPT | Mod: 26

## 2020-07-07 PROCEDURE — 59025 FETAL NON-STRESS TEST: CPT | Mod: 26

## 2020-07-07 PROCEDURE — L9991: CPT

## 2020-07-07 PROCEDURE — 99213 OFFICE O/P EST LOW 20 MIN: CPT | Mod: 25

## 2020-07-07 RX ORDER — NITROFURANTOIN MACROCRYSTAL 50 MG
1 CAPSULE ORAL
Qty: 14 | Refills: 0
Start: 2020-07-07 | End: 2020-07-13

## 2020-07-07 NOTE — OB RN TRIAGE NOTE - MENTAL HEALTH CONDITIONS/SYMPTOMS, PROFILE
anxiety disorder/self diagnosed 1 month ago, offered meds by Dr Alonso but pt doesn't want to take it

## 2020-07-07 NOTE — OB PROVIDER TRIAGE NOTE - NSHPLABSRESULTS_GEN_ALL_CORE
urinalysis  Urinalysis Basic - ( 2020 15:34 )    Color: YELLOW / Appearance: Lt TURBID / S.022 / pH: 6.0  Gluc: NEGATIVE / Ketone: TRACE  / Bili: NEGATIVE / Urobili: NORMAL   Blood: NEGATIVE / Protein: 50 / Nitrite: NEGATIVE   Leuk Esterase: MODERATE / RBC: 0-2 / WBC 26-50   Sq Epi: MANY / Non Sq Epi: x / Bacteria: MANY    urine c&S

## 2020-07-07 NOTE — OB RN TRIAGE NOTE - PMH
Anemia  iron def anemia  iron transfusions started end of feb and transfused x 1 unit 3/5  Fibroids    History of termination of pregnancy  2014

## 2020-07-07 NOTE — OB PROVIDER TRIAGE NOTE - NSOBPROVIDERNOTE_OBGYN_ALL_OB_FT
28 y/o   @ 32.1 wks gest presents with c/o lower abdominal pain / lower back pain since this am and increased pelvic pressure denies any hematuria or dysuria denies any recent intercourse denies any vb or lof reports +FM denies any n/v/d denies any fever or chills ap care comp by :   hosp x's 1 3/5/2020 x's 2 night s/p blood transfusion x's 1 unit PRBC , iron deficiencey anemia    sched for 2020        abdomen: soft, nt on palp  no guarding no rebound tenderness noted  SSE; cervix appears closed and posterior   SVE: closed and posterior   TVS: 3.90 cm no dynamic changes  T(C): 37.3 (20 @ 14:44), Max: 37.3 (20 @ 14:37)  HR: 94 (20 @ 16:32) (90 - 118)  BP: 111/64 (20 @ 16:25) (100/62 - 117/62)  RR: 17 (20 @ 14:44) (17 - 17)  SpO2: 97% (20 @ 16:32) (97% - 99%)    cat 1 FHT  toco: uterine irritability noted    NKA  med hx:   iron deficiencey anemia  surg hx:   2006 D&C x's 1   primary    gyn hx:   fibroid x's 1  ob hx:  2004 ETOP x's 1   primary  arrest of dilitation , 3cm, 8#11  meds:  PNV  FeSO4  psychosocial hx:  rxed anxiety meds  , pt states increased anxiety secondary to work   pt has not taken meds at this time 28 y/o   @ 32.1 wks gest presents with c/o lower abdominal pain / lower back pain since this am and increased pelvic pressure denies any hematuria or dysuria denies any recent intercourse denies any vb or lof reports +FM denies any n/v/d denies any fever or chills ap care comp by :   hosp x's 1 3/5/2020 x's 2 night s/p blood transfusion x's 1 unit PRBC , iron deficiencey anemia    sched for 2020        abdomen: soft, nt on palp  no guarding no rebound tenderness noted  SSE; cervix appears closed and posterior   SVE: closed and posterior   TVS: 3.90 cm no dynamic changes  T(C): 37.3 (20 @ 14:44), Max: 37.3 (20 @ 14:37)  HR: 94 (20 @ 16:32) (90 - 118)  BP: 111/64 (20 @ 16:25) (100/62 - 117/62)  RR: 17 (20 @ 14:44) (17 - 17)  SpO2: 97% (20 @ 16:32) (97% - 99%)    cat 1 FHT  toco: uterine irritability noted  TAS: BPP:  breech posterior placenta GLADYS: 14.68    NKA  med hx:   iron deficiencey anemia  surg hx:   2006 D&C x's 1   primary    gyn hx:   fibroid x's 1  ob hx:  2004 ETOP x's 1   primary  arrest of dilitation , 3cm, 8#11  meds:  PNV  FeSO4  psychosocial hx:  rxed anxiety meds  , pt states increased anxiety secondary to work   pt has not taken meds at this time 30 y/o   @ 32.1 wks gest presents with c/o lower abdominal pain / lower back pain since this am and increased pelvic pressure denies any hematuria or dysuria denies any recent intercourse denies any vb or lof reports +FM denies any n/v/d denies any fever or chills ap care comp by :   hosp x's 1 3/5/2020 x's 2 night s/p blood transfusion x's 1 unit PRBC , iron deficiencey anemia    sched for 2020        abdomen: soft, nt on palp  no guarding no rebound tenderness noted  SSE; cervix appears closed and posterior   SVE: closed and posterior   TVS: 3.90 cm no dynamic changes  T(C): 37.3 (20 @ 14:44), Max: 37.3 (20 @ 14:37)  HR: 94 (20 @ 16:32) (90 - 118)  BP: 111/64 (20 @ 16:25) (100/62 - 117/62)  RR: 17 (20 @ 14:44) (17 - 17)  SpO2: 97% (20 @ 16:32) (97% - 99%)    cat 1 FHT  toco: uterine irritability noted  TAS: BPP:  breech posterior placenta GLADYS: 14.68    NKA  med hx:   iron deficiencey anemia  surg hx:   2006 D&C x's 1   primary    gyn hx:   fibroid x's 1  ob hx:  2004 ETOP x's 1   primary  arrest of dilitation , 3cm, 8#11  meds:  PNV  FeSO4  psychosocial hx:  rxed anxiety meds  , pt states increased anxiety secondary to work   pt has not taken meds at this time      addendum @ 1800:  category 1 FHT  toco: no uterine contractions noted    no evidence of PTL   maternal and fetal status reassuring   possible UTI   d/w dr sample   discharge home  PTL precautions d/w pt  increase fluid intake  instructed on fetal kickcounts  rx: Macrobid 100 mg PO BID x's 7 days  will follow up with result of urine c&S  follow up with dr burton as sched  w/v discharge instructions given  discharged home

## 2020-07-07 NOTE — OB PROVIDER TRIAGE NOTE - ADDITIONAL INSTRUCTIONS
no evidence of PTL   maternal and fetal status reassuring   possible UTI   d/w  sample   discharge home  PTL precautions d/w pt  increase fluid intake  instructed on fetal kickcounts  rx: Macrobid 100 mg PO BID x's 7 days  will follow up with result of urine c&S  follow up with dr burton as sched  w/v discharge instructions given  discharged home

## 2020-07-08 LAB
CULTURE RESULTS: SIGNIFICANT CHANGE UP
SPECIMEN SOURCE: SIGNIFICANT CHANGE UP

## 2020-07-09 ENCOUNTER — OUTPATIENT (OUTPATIENT)
Dept: OUTPATIENT SERVICES | Facility: HOSPITAL | Age: 30
LOS: 1 days | Discharge: HOME | End: 2020-07-09

## 2020-07-09 DIAGNOSIS — Z98.891 HISTORY OF UTERINE SCAR FROM PREVIOUS SURGERY: Chronic | ICD-10-CM

## 2020-07-09 DIAGNOSIS — Z98.890 OTHER SPECIFIED POSTPROCEDURAL STATES: Chronic | ICD-10-CM

## 2020-07-09 DIAGNOSIS — D64.9 ANEMIA, UNSPECIFIED: ICD-10-CM

## 2020-07-13 ENCOUNTER — APPOINTMENT (OUTPATIENT)
Dept: HEMATOLOGY ONCOLOGY | Facility: CLINIC | Age: 30
End: 2020-07-13
Payer: COMMERCIAL

## 2020-07-13 ENCOUNTER — NON-APPOINTMENT (OUTPATIENT)
Age: 30
End: 2020-07-13

## 2020-07-13 ENCOUNTER — APPOINTMENT (OUTPATIENT)
Dept: OBGYN | Facility: CLINIC | Age: 30
End: 2020-07-13
Payer: COMMERCIAL

## 2020-07-13 VITALS
BODY MASS INDEX: 45.64 KG/M2 | SYSTOLIC BLOOD PRESSURE: 122 MMHG | HEIGHT: 62 IN | DIASTOLIC BLOOD PRESSURE: 70 MMHG | WEIGHT: 248 LBS

## 2020-07-13 PROCEDURE — 0502F SUBSEQUENT PRENATAL CARE: CPT

## 2020-07-13 RX ORDER — SERTRALINE HYDROCHLORIDE 50 MG/1
50 TABLET, FILM COATED ORAL DAILY
Qty: 30 | Refills: 0 | Status: DISCONTINUED | COMMUNITY
Start: 2020-06-29 | End: 2020-07-13

## 2020-07-13 NOTE — ASSESSMENT
[FreeTextEntry1] : 28 yo woman with 12 weeks pregnancy, referred for evaluation and management of anemia of pregnancy.\par \par Patient feels fatigue, tired, lightheadedness, dizziness, positive for palpitations, no chest pain, denies melena or hematochezia. Denies prior history of anemia, this is her second pregnancy, no history of anemia with first pregnancy. \par \par Labs 2/4/2020: WBC 11.97, Hb 7.5 g/dl, Hct 29.1%, MCV 65.5%, RDW 24.1%, . \par \par I had a detailed discussion today with the patient regarding the natural history, epidemiology, diagnosis and treatment of  iron deficiency anemia. I reviewed her laboratory studies in detail today. I then discussed treatment with Venofer 200 mg every other day x 5 doses.  I reviewed with patient the benefits versus risks of therapy. I answered all her questions to satisfaction.\par \par Greater than 50% of the encounter time was spent on counseling and coordination of care for SALEEM  and I have spent  45   minutes of face to face time with the patient.\par \par RTC 2 months.

## 2020-07-13 NOTE — REVIEW OF SYSTEMS
[Fatigue] : fatigue [SOB on Exertion] : shortness of breath during exertion [Palpitations] : palpitations [Dizziness] : dizziness [Easy Bleeding] : no tendency for easy bleeding [Negative] : Endocrine

## 2020-07-13 NOTE — CONSULT LETTER
[Dear  ___] : Dear  [unfilled], [Please see my note below.] : Please see my note below. [Consult Letter:] : I had the pleasure of evaluating your patient, [unfilled]. [Consult Closing:] : Thank you very much for allowing me to participate in the care of this patient.  If you have any questions, please do not hesitate to contact me. [Sincerely,] : Sincerely, [FreeTextEntry2] : Dr Phil Alonso

## 2020-07-23 ENCOUNTER — RESULT REVIEW (OUTPATIENT)
Age: 30
End: 2020-07-23

## 2020-07-23 ENCOUNTER — APPOINTMENT (OUTPATIENT)
Dept: HEMATOLOGY ONCOLOGY | Facility: CLINIC | Age: 30
End: 2020-07-23
Payer: COMMERCIAL

## 2020-07-23 ENCOUNTER — APPOINTMENT (OUTPATIENT)
Dept: HEMATOLOGY ONCOLOGY | Facility: CLINIC | Age: 30
End: 2020-07-23

## 2020-07-23 LAB
BASOPHILS # BLD AUTO: 0.03 K/UL — SIGNIFICANT CHANGE UP (ref 0–0.2)
BASOPHILS NFR BLD AUTO: 0.3 % — SIGNIFICANT CHANGE UP (ref 0–2)
EOSINOPHIL # BLD AUTO: 0.05 K/UL — SIGNIFICANT CHANGE UP (ref 0–0.5)
EOSINOPHIL NFR BLD AUTO: 0.5 % — SIGNIFICANT CHANGE UP (ref 0–6)
HCT VFR BLD CALC: 28 % — LOW (ref 34.5–45)
HGB BLD-MCNC: 8.8 G/DL — LOW (ref 11.5–15.5)
IMM GRANULOCYTES NFR BLD AUTO: 1.2 % — SIGNIFICANT CHANGE UP (ref 0–1.5)
LYMPHOCYTES # BLD AUTO: 19.4 % — SIGNIFICANT CHANGE UP (ref 13–44)
LYMPHOCYTES # BLD AUTO: 2.15 K/UL — SIGNIFICANT CHANGE UP (ref 1–3.3)
MCHC RBC-ENTMCNC: 24.4 PG — LOW (ref 27–34)
MCHC RBC-ENTMCNC: 31.4 GM/DL — LOW (ref 32–36)
MCV RBC AUTO: 77.8 FL — LOW (ref 80–100)
MONOCYTES # BLD AUTO: 0.7 K/UL — SIGNIFICANT CHANGE UP (ref 0–0.9)
MONOCYTES NFR BLD AUTO: 6.3 % — SIGNIFICANT CHANGE UP (ref 2–14)
NEUTROPHILS # BLD AUTO: 8 K/UL — HIGH (ref 1.8–7.4)
NEUTROPHILS NFR BLD AUTO: 72.3 % — SIGNIFICANT CHANGE UP (ref 43–77)
NRBC # BLD: 0 /100 WBCS — SIGNIFICANT CHANGE UP (ref 0–0)
PLATELET # BLD AUTO: 314 K/UL — SIGNIFICANT CHANGE UP (ref 150–400)
RBC # BLD: 3.6 M/UL — LOW (ref 3.8–5.2)
RBC # FLD: 17 % — HIGH (ref 10.3–14.5)
WBC # BLD: 11.06 K/UL — HIGH (ref 3.8–10.5)
WBC # FLD AUTO: 11.06 K/UL — HIGH (ref 3.8–10.5)

## 2020-07-23 PROCEDURE — 99213 OFFICE O/P EST LOW 20 MIN: CPT | Mod: 95

## 2020-07-23 NOTE — CONSULT LETTER
[Dear  ___] : Dear  [unfilled], [Consult Letter:] : I had the pleasure of evaluating your patient, [unfilled]. [Consult Closing:] : Thank you very much for allowing me to participate in the care of this patient.  If you have any questions, please do not hesitate to contact me. [Please see my note below.] : Please see my note below. [Sincerely,] : Sincerely, [FreeTextEntry2] : Dr Phil Alonso

## 2020-07-23 NOTE — HISTORY OF PRESENT ILLNESS
[0 - No Distress] : Distress Level: 0 [Home] : at home, [unfilled] , at the time of the visit. [Medical Office: ([facility])___] : at the medical office located in  [Self] : self [Family Member] : family member [Patient] : the patient [de-identified] : 30 yo woman with 12 weeks pregnancy, referred for evaluation and management of anemia of pregnancy.\par \par Patient feels fatigue, tired, lightheadedness, dizziness, positive for palpitations, no chest pain, denies melena or hematochezia. Denies prior history of anemia, this is her second pregnancy, no history of anemia with first pregnancy. \par \par Labs 2/4/2020: WBC 11.97, Hb 7.5 g/dl, Hct 29.1%, MCV 65.5%, RDW 24.1%, . \par \par  [FreeTextEntry4] : relative [FreeTextEntry2] : Holli Grace [de-identified] : Patient is 34 weeks pregnant, no complications. Patient was treated with Venofer  200 mg  x 3 was unable to finished them secondary to pandemia. Patient was unable to take oral  iron supplements, she developed nausea and vomiting. \par \par No other changes in medical, surgical or social history since 4/16/2020. \par

## 2020-07-23 NOTE — ASSESSMENT
[FreeTextEntry1] : 30 yo woman with 12 weeks pregnancy, referred for evaluation and management of iron deficiency anemia \par Labs 2/4/2020: WBC 11.97, Hb 7.5 g/dl, Hct 29.1%, MCV 65.5%, RDW 24.1%, . \par \par Patient is 34 weeks pregnant, no complications. Patient was treated with Venofer  200 mg  x 3 was unable to finished them secondary to pandemia. Patient was unable to take oral  iron supplements, she developed nausea and vomiting. Will check iron levels and schedule her for 4 additional treatments with Venofer 200 mg x 4 doses. \par \par \par This service was provided by using telehealth. The patient was at home and I was at [facility]. The patient requested and participated in this encounter. The encounter face to face last 30   minutes coordinating his/her care and counseling.\par \par \par RTC 2 months.

## 2020-07-23 NOTE — REVIEW OF SYSTEMS
[Negative] : Endocrine [Palpitations] : no palpitations [Fatigue] : fatigue [Dizziness] : dizziness [SOB on Exertion] : shortness of breath during exertion [Easy Bleeding] : no tendency for easy bleeding

## 2020-07-24 LAB
ALBUMIN SERPL ELPH-MCNC: 3.4 G/DL
ALP BLD-CCNC: 88 U/L
ALT SERPL-CCNC: 11 U/L
ANION GAP SERPL CALC-SCNC: 15 MMOL/L
AST SERPL-CCNC: 13 U/L
BILIRUB SERPL-MCNC: 0.2 MG/DL
BUN SERPL-MCNC: 4 MG/DL
CALCIUM SERPL-MCNC: 9.3 MG/DL
CHLORIDE SERPL-SCNC: 105 MMOL/L
CO2 SERPL-SCNC: 20 MMOL/L
CREAT SERPL-MCNC: 0.57 MG/DL
FERRITIN SERPL-MCNC: 11 NG/ML
GLUCOSE SERPL-MCNC: 136 MG/DL
IRON SATN MFR SERPL: 7 %
IRON SERPL-MCNC: 33 UG/DL
POTASSIUM SERPL-SCNC: 4.3 MMOL/L
PROT SERPL-MCNC: 6.3 G/DL
SODIUM SERPL-SCNC: 140 MMOL/L
TIBC SERPL-MCNC: 452 UG/DL
UIBC SERPL-MCNC: 419 UG/DL

## 2020-07-27 ENCOUNTER — TRANSCRIPTION ENCOUNTER (OUTPATIENT)
Age: 30
End: 2020-07-27

## 2020-07-27 ENCOUNTER — APPOINTMENT (OUTPATIENT)
Dept: INFUSION THERAPY | Facility: HOSPITAL | Age: 30
End: 2020-07-27

## 2020-07-28 ENCOUNTER — INPATIENT (INPATIENT)
Facility: HOSPITAL | Age: 30
LOS: 1 days | Discharge: HOME | End: 2020-07-30
Attending: OBSTETRICS & GYNECOLOGY | Admitting: OBSTETRICS & GYNECOLOGY
Payer: COMMERCIAL

## 2020-07-28 ENCOUNTER — RESULT REVIEW (OUTPATIENT)
Age: 30
End: 2020-07-28

## 2020-07-28 ENCOUNTER — TRANSCRIPTION ENCOUNTER (OUTPATIENT)
Age: 30
End: 2020-07-28

## 2020-07-28 ENCOUNTER — APPOINTMENT (OUTPATIENT)
Dept: OBGYN | Facility: CLINIC | Age: 30
End: 2020-07-28

## 2020-07-28 VITALS
HEART RATE: 107 BPM | DIASTOLIC BLOOD PRESSURE: 73 MMHG | TEMPERATURE: 99 F | SYSTOLIC BLOOD PRESSURE: 131 MMHG | RESPIRATION RATE: 18 BRPM

## 2020-07-28 DIAGNOSIS — O26.899 OTHER SPECIFIED PREGNANCY RELATED CONDITIONS, UNSPECIFIED TRIMESTER: ICD-10-CM

## 2020-07-28 DIAGNOSIS — Z98.890 OTHER SPECIFIED POSTPROCEDURAL STATES: Chronic | ICD-10-CM

## 2020-07-28 DIAGNOSIS — D25.9 LEIOMYOMA OF UTERUS, UNSPECIFIED: ICD-10-CM

## 2020-07-28 DIAGNOSIS — Z3A.00 WEEKS OF GESTATION OF PREGNANCY NOT SPECIFIED: ICD-10-CM

## 2020-07-28 DIAGNOSIS — Z98.891 HISTORY OF UTERINE SCAR FROM PREVIOUS SURGERY: Chronic | ICD-10-CM

## 2020-07-28 LAB
ALBUMIN SERPL ELPH-MCNC: 3.1 G/DL — LOW (ref 3.3–5)
ALP SERPL-CCNC: 88 U/L — SIGNIFICANT CHANGE UP (ref 40–120)
ALT FLD-CCNC: 8 U/L — SIGNIFICANT CHANGE UP (ref 4–33)
ANION GAP SERPL CALC-SCNC: 11 MMO/L — SIGNIFICANT CHANGE UP (ref 7–14)
APTT BLD: 21.9 SEC — LOW (ref 27–36.3)
APTT BLD: 23.9 SEC — LOW (ref 27–36.3)
AST SERPL-CCNC: 11 U/L — SIGNIFICANT CHANGE UP (ref 4–32)
BASOPHILS # BLD AUTO: 0.02 K/UL — SIGNIFICANT CHANGE UP (ref 0–0.2)
BASOPHILS # BLD AUTO: 0.03 K/UL — SIGNIFICANT CHANGE UP (ref 0–0.2)
BASOPHILS NFR BLD AUTO: 0.1 % — SIGNIFICANT CHANGE UP (ref 0–2)
BASOPHILS NFR BLD AUTO: 0.2 % — SIGNIFICANT CHANGE UP (ref 0–2)
BILIRUB SERPL-MCNC: 0.4 MG/DL — SIGNIFICANT CHANGE UP (ref 0.2–1.2)
BLD GP AB SCN SERPL QL: NEGATIVE — SIGNIFICANT CHANGE UP
BUN SERPL-MCNC: 4 MG/DL — LOW (ref 7–23)
CALCIUM SERPL-MCNC: 8.8 MG/DL — SIGNIFICANT CHANGE UP (ref 8.4–10.5)
CHLORIDE SERPL-SCNC: 100 MMOL/L — SIGNIFICANT CHANGE UP (ref 98–107)
CO2 SERPL-SCNC: 21 MMOL/L — LOW (ref 22–31)
CREAT SERPL-MCNC: 0.44 MG/DL — LOW (ref 0.5–1.3)
EOSINOPHIL # BLD AUTO: 0.01 K/UL — SIGNIFICANT CHANGE UP (ref 0–0.5)
EOSINOPHIL # BLD AUTO: 0.05 K/UL — SIGNIFICANT CHANGE UP (ref 0–0.5)
EOSINOPHIL NFR BLD AUTO: 0.1 % — SIGNIFICANT CHANGE UP (ref 0–6)
EOSINOPHIL NFR BLD AUTO: 0.3 % — SIGNIFICANT CHANGE UP (ref 0–6)
FIBRINOGEN PPP-MCNC: 823 MG/DL — HIGH (ref 300–520)
FIBRINOGEN PPP-MCNC: 856 MG/DL — HIGH (ref 300–520)
GLUCOSE SERPL-MCNC: 88 MG/DL — SIGNIFICANT CHANGE UP (ref 70–99)
HCT VFR BLD CALC: 30.9 % — LOW (ref 34.5–45)
HCT VFR BLD CALC: 30.9 % — LOW (ref 34.5–45)
HGB BLD-MCNC: 9.5 G/DL — LOW (ref 11.5–15.5)
HGB BLD-MCNC: 9.7 G/DL — LOW (ref 11.5–15.5)
IMM GRANULOCYTES NFR BLD AUTO: 0.7 % — SIGNIFICANT CHANGE UP (ref 0–1.5)
IMM GRANULOCYTES NFR BLD AUTO: 0.7 % — SIGNIFICANT CHANGE UP (ref 0–1.5)
INR BLD: 1.02 — SIGNIFICANT CHANGE UP (ref 0.88–1.17)
INR BLD: 1.08 — SIGNIFICANT CHANGE UP (ref 0.88–1.17)
LYMPHOCYTES # BLD AUTO: 1.73 K/UL — SIGNIFICANT CHANGE UP (ref 1–3.3)
LYMPHOCYTES # BLD AUTO: 11.1 % — LOW (ref 13–44)
LYMPHOCYTES # BLD AUTO: 15 % — SIGNIFICANT CHANGE UP (ref 13–44)
LYMPHOCYTES # BLD AUTO: 2.44 K/UL — SIGNIFICANT CHANGE UP (ref 1–3.3)
MCHC RBC-ENTMCNC: 23.7 PG — LOW (ref 27–34)
MCHC RBC-ENTMCNC: 24.2 PG — LOW (ref 27–34)
MCHC RBC-ENTMCNC: 30.7 % — LOW (ref 32–36)
MCHC RBC-ENTMCNC: 31.4 % — LOW (ref 32–36)
MCV RBC AUTO: 77.1 FL — LOW (ref 80–100)
MCV RBC AUTO: 77.1 FL — LOW (ref 80–100)
MONOCYTES # BLD AUTO: 1.03 K/UL — HIGH (ref 0–0.9)
MONOCYTES # BLD AUTO: 1.37 K/UL — HIGH (ref 0–0.9)
MONOCYTES NFR BLD AUTO: 6.6 % — SIGNIFICANT CHANGE UP (ref 2–14)
MONOCYTES NFR BLD AUTO: 8.4 % — SIGNIFICANT CHANGE UP (ref 2–14)
NEUTROPHILS # BLD AUTO: 12.28 K/UL — HIGH (ref 1.8–7.4)
NEUTROPHILS # BLD AUTO: 12.63 K/UL — HIGH (ref 1.8–7.4)
NEUTROPHILS NFR BLD AUTO: 75.4 % — SIGNIFICANT CHANGE UP (ref 43–77)
NEUTROPHILS NFR BLD AUTO: 81.4 % — HIGH (ref 43–77)
NRBC # FLD: 0 K/UL — SIGNIFICANT CHANGE UP (ref 0–0)
NRBC # FLD: 0 K/UL — SIGNIFICANT CHANGE UP (ref 0–0)
PLATELET # BLD AUTO: 371 K/UL — SIGNIFICANT CHANGE UP (ref 150–400)
PLATELET # BLD AUTO: 404 K/UL — HIGH (ref 150–400)
PMV BLD: 10.8 FL — SIGNIFICANT CHANGE UP (ref 7–13)
PMV BLD: 11.7 FL — SIGNIFICANT CHANGE UP (ref 7–13)
POTASSIUM SERPL-MCNC: 4.2 MMOL/L — SIGNIFICANT CHANGE UP (ref 3.5–5.3)
POTASSIUM SERPL-SCNC: 4.2 MMOL/L — SIGNIFICANT CHANGE UP (ref 3.5–5.3)
PROT SERPL-MCNC: 6.5 G/DL — SIGNIFICANT CHANGE UP (ref 6–8.3)
PROTHROM AB SERPL-ACNC: 11.6 SEC — SIGNIFICANT CHANGE UP (ref 9.8–13.1)
PROTHROM AB SERPL-ACNC: 12.4 SEC — SIGNIFICANT CHANGE UP (ref 9.8–13.1)
RBC # BLD: 4.01 M/UL — SIGNIFICANT CHANGE UP (ref 3.8–5.2)
RBC # BLD: 4.01 M/UL — SIGNIFICANT CHANGE UP (ref 3.8–5.2)
RBC # FLD: 17.1 % — HIGH (ref 10.3–14.5)
RBC # FLD: 17.3 % — HIGH (ref 10.3–14.5)
RH IG SCN BLD-IMP: POSITIVE — SIGNIFICANT CHANGE UP
SODIUM SERPL-SCNC: 132 MMOL/L — LOW (ref 135–145)
T PALLIDUM AB TITR SER: NEGATIVE — SIGNIFICANT CHANGE UP
WBC # BLD: 15.53 K/UL — HIGH (ref 3.8–10.5)
WBC # BLD: 16.28 K/UL — HIGH (ref 3.8–10.5)
WBC # FLD AUTO: 15.53 K/UL — HIGH (ref 3.8–10.5)
WBC # FLD AUTO: 16.28 K/UL — HIGH (ref 3.8–10.5)

## 2020-07-28 PROCEDURE — 88307 TISSUE EXAM BY PATHOLOGIST: CPT | Mod: 26

## 2020-07-28 PROCEDURE — 59514 CESAREAN DELIVERY ONLY: CPT | Mod: AS

## 2020-07-28 PROCEDURE — 59510 CESAREAN DELIVERY: CPT | Mod: U7

## 2020-07-28 RX ORDER — OXYCODONE HYDROCHLORIDE 5 MG/1
5 TABLET ORAL
Refills: 0 | Status: DISCONTINUED | OUTPATIENT
Start: 2020-07-28 | End: 2020-07-29

## 2020-07-28 RX ORDER — MAGNESIUM HYDROXIDE 400 MG/1
30 TABLET, CHEWABLE ORAL
Refills: 0 | Status: DISCONTINUED | OUTPATIENT
Start: 2020-07-28 | End: 2020-07-30

## 2020-07-28 RX ORDER — HYDROMORPHONE HYDROCHLORIDE 2 MG/ML
1 INJECTION INTRAMUSCULAR; INTRAVENOUS; SUBCUTANEOUS
Refills: 0 | Status: DISCONTINUED | OUTPATIENT
Start: 2020-07-28 | End: 2020-07-29

## 2020-07-28 RX ORDER — AMPICILLIN TRIHYDRATE 250 MG
2 CAPSULE ORAL ONCE
Refills: 0 | Status: COMPLETED | OUTPATIENT
Start: 2020-07-28 | End: 2020-07-28

## 2020-07-28 RX ORDER — SODIUM CHLORIDE 9 MG/ML
1000 INJECTION, SOLUTION INTRAVENOUS ONCE
Refills: 0 | Status: COMPLETED | OUTPATIENT
Start: 2020-07-28 | End: 2020-07-28

## 2020-07-28 RX ORDER — DIPHENHYDRAMINE HCL 50 MG
25 CAPSULE ORAL EVERY 6 HOURS
Refills: 0 | Status: DISCONTINUED | OUTPATIENT
Start: 2020-07-28 | End: 2020-07-30

## 2020-07-28 RX ORDER — FAMOTIDINE 10 MG/ML
20 INJECTION INTRAVENOUS ONCE
Refills: 0 | Status: COMPLETED | OUTPATIENT
Start: 2020-07-28 | End: 2020-07-28

## 2020-07-28 RX ORDER — METOCLOPRAMIDE HCL 10 MG
10 TABLET ORAL ONCE
Refills: 0 | Status: COMPLETED | OUTPATIENT
Start: 2020-07-28 | End: 2020-07-28

## 2020-07-28 RX ORDER — SODIUM CHLORIDE 9 MG/ML
1000 INJECTION, SOLUTION INTRAVENOUS
Refills: 0 | Status: DISCONTINUED | OUTPATIENT
Start: 2020-07-28 | End: 2020-07-28

## 2020-07-28 RX ORDER — SODIUM CHLORIDE 9 MG/ML
1000 INJECTION, SOLUTION INTRAVENOUS ONCE
Refills: 0 | Status: DISCONTINUED | OUTPATIENT
Start: 2020-07-28 | End: 2020-07-28

## 2020-07-28 RX ORDER — ASPIRIN/CALCIUM CARB/MAGNESIUM 324 MG
2 TABLET ORAL
Qty: 0 | Refills: 0 | DISCHARGE

## 2020-07-28 RX ORDER — OXYTOCIN 10 UNIT/ML
VIAL (ML) INJECTION
Qty: 20 | Refills: 0 | Status: DISCONTINUED | OUTPATIENT
Start: 2020-07-28 | End: 2020-07-28

## 2020-07-28 RX ORDER — SIMETHICONE 80 MG/1
80 TABLET, CHEWABLE ORAL EVERY 4 HOURS
Refills: 0 | Status: DISCONTINUED | OUTPATIENT
Start: 2020-07-28 | End: 2020-07-30

## 2020-07-28 RX ORDER — TETANUS TOXOID, REDUCED DIPHTHERIA TOXOID AND ACELLULAR PERTUSSIS VACCINE, ADSORBED 5; 2.5; 8; 8; 2.5 [IU]/.5ML; [IU]/.5ML; UG/.5ML; UG/.5ML; UG/.5ML
0.5 SUSPENSION INTRAMUSCULAR ONCE
Refills: 0 | Status: DISCONTINUED | OUTPATIENT
Start: 2020-07-28 | End: 2020-07-30

## 2020-07-28 RX ORDER — CITRIC ACID/SODIUM CITRATE 300-500 MG
30 SOLUTION, ORAL ORAL ONCE
Refills: 0 | Status: COMPLETED | OUTPATIENT
Start: 2020-07-28 | End: 2020-07-28

## 2020-07-28 RX ORDER — OXYTOCIN 10 UNIT/ML
333.33 VIAL (ML) INJECTION
Qty: 20 | Refills: 0 | Status: DISCONTINUED | OUTPATIENT
Start: 2020-07-28 | End: 2020-07-29

## 2020-07-28 RX ORDER — ACETAMINOPHEN 500 MG
975 TABLET ORAL ONCE
Refills: 0 | Status: COMPLETED | OUTPATIENT
Start: 2020-07-28 | End: 2020-07-28

## 2020-07-28 RX ORDER — LANOLIN
1 OINTMENT (GRAM) TOPICAL EVERY 6 HOURS
Refills: 0 | Status: DISCONTINUED | OUTPATIENT
Start: 2020-07-28 | End: 2020-07-30

## 2020-07-28 RX ORDER — OXYCODONE HYDROCHLORIDE 5 MG/1
10 TABLET ORAL
Refills: 0 | Status: DISCONTINUED | OUTPATIENT
Start: 2020-07-28 | End: 2020-07-29

## 2020-07-28 RX ORDER — SODIUM CHLORIDE 9 MG/ML
500 INJECTION, SOLUTION INTRAVENOUS ONCE
Refills: 0 | Status: COMPLETED | OUTPATIENT
Start: 2020-07-28 | End: 2020-07-28

## 2020-07-28 RX ORDER — KETOROLAC TROMETHAMINE 30 MG/ML
30 SYRINGE (ML) INJECTION EVERY 6 HOURS
Refills: 0 | Status: DISCONTINUED | OUTPATIENT
Start: 2020-07-28 | End: 2020-07-29

## 2020-07-28 RX ORDER — IBUPROFEN 200 MG
600 TABLET ORAL EVERY 6 HOURS
Refills: 0 | Status: COMPLETED | OUTPATIENT
Start: 2020-07-28 | End: 2021-06-26

## 2020-07-28 RX ORDER — ACETAMINOPHEN 500 MG
975 TABLET ORAL
Refills: 0 | Status: DISCONTINUED | OUTPATIENT
Start: 2020-07-28 | End: 2020-07-30

## 2020-07-28 RX ORDER — SODIUM CHLORIDE 9 MG/ML
1000 INJECTION, SOLUTION INTRAVENOUS
Refills: 0 | Status: DISCONTINUED | OUTPATIENT
Start: 2020-07-28 | End: 2020-07-29

## 2020-07-28 RX ORDER — HEPARIN SODIUM 5000 [USP'U]/ML
10000 INJECTION INTRAVENOUS; SUBCUTANEOUS EVERY 12 HOURS
Refills: 0 | Status: DISCONTINUED | OUTPATIENT
Start: 2020-07-28 | End: 2020-07-30

## 2020-07-28 RX ORDER — AMPICILLIN TRIHYDRATE 250 MG
1 CAPSULE ORAL EVERY 4 HOURS
Refills: 0 | Status: DISCONTINUED | OUTPATIENT
Start: 2020-07-28 | End: 2020-07-28

## 2020-07-28 RX ADMIN — SODIUM CHLORIDE 1000 MILLILITER(S): 9 INJECTION, SOLUTION INTRAVENOUS at 18:54

## 2020-07-28 RX ADMIN — OXYCODONE HYDROCHLORIDE 5 MILLIGRAM(S): 5 TABLET ORAL at 14:48

## 2020-07-28 RX ADMIN — SODIUM CHLORIDE 1000 MILLILITER(S): 9 INJECTION, SOLUTION INTRAVENOUS at 15:50

## 2020-07-28 RX ADMIN — Medication 975 MILLIGRAM(S): at 21:10

## 2020-07-28 RX ADMIN — Medication 10 MILLIGRAM(S): at 10:11

## 2020-07-28 RX ADMIN — HEPARIN SODIUM 10000 UNIT(S): 5000 INJECTION INTRAVENOUS; SUBCUTANEOUS at 18:55

## 2020-07-28 RX ADMIN — SODIUM CHLORIDE 2000 MILLILITER(S): 9 INJECTION, SOLUTION INTRAVENOUS at 12:20

## 2020-07-28 RX ADMIN — HYDROMORPHONE HYDROCHLORIDE 1 MILLIGRAM(S): 2 INJECTION INTRAMUSCULAR; INTRAVENOUS; SUBCUTANEOUS at 20:20

## 2020-07-28 RX ADMIN — SODIUM CHLORIDE 125 MILLILITER(S): 9 INJECTION, SOLUTION INTRAVENOUS at 20:20

## 2020-07-28 RX ADMIN — SODIUM CHLORIDE 125 MILLILITER(S): 9 INJECTION, SOLUTION INTRAVENOUS at 08:30

## 2020-07-28 RX ADMIN — HYDROMORPHONE HYDROCHLORIDE 1 MILLIGRAM(S): 2 INJECTION INTRAMUSCULAR; INTRAVENOUS; SUBCUTANEOUS at 20:31

## 2020-07-28 RX ADMIN — SODIUM CHLORIDE 75 MILLILITER(S): 9 INJECTION, SOLUTION INTRAVENOUS at 12:18

## 2020-07-28 RX ADMIN — OXYCODONE HYDROCHLORIDE 5 MILLIGRAM(S): 5 TABLET ORAL at 15:42

## 2020-07-28 RX ADMIN — Medication 975 MILLIGRAM(S): at 21:03

## 2020-07-28 RX ADMIN — Medication 30 MILLILITER(S): at 10:11

## 2020-07-28 RX ADMIN — Medication 1000 MILLIUNIT(S)/MIN: at 12:18

## 2020-07-28 RX ADMIN — FAMOTIDINE 20 MILLIGRAM(S): 10 INJECTION INTRAVENOUS at 10:11

## 2020-07-28 RX ADMIN — Medication 216 GRAM(S): at 08:42

## 2020-07-28 NOTE — OB PROVIDER TRIAGE NOTE - NSHPPHYSICALEXAM_GEN_ALL_CORE
n/a
Vital Signs Last 24 Hrs  T(C): 37.4 (28 Jul 2020 07:24), Max: 37.4 (28 Jul 2020 07:14)  T(F): 99.32 (28 Jul 2020 07:24), Max: 99.32 (28 Jul 2020 07:24)  HR: 107 (28 Jul 2020 07:21) (107 - 107)  BP: 131/73 (28 Jul 2020 07:21) (131/73 - 131/73)  BP(mean): --  RR: 18 (28 Jul 2020 07:14) (18 - 18)  SpO2: --    A&O x3  CTAB  normal RRR  abdomen: gravid, soft, nontender  sse: + pooling clear, + nitrazine + fern  sve 1/50/-3

## 2020-07-28 NOTE — BRIEF OPERATIVE NOTE - OPERATION/FINDINGS
Viable female infant, apgar 8/9, wgt4.10 #, 2120gms  delivered @1108  cephalic presentation, clear copious fluid   loose nuchal x 1  hysterotomy closed in 2 layers  otherwise grossly nl uterus, tubes & ovaries  QBL: 754  EBL: 800  UOP: 150  IVF: 2800  Dictation Number: [id number]

## 2020-07-28 NOTE — DISCHARGE NOTE OB - CARE PLAN
Principal Discharge DX:	 delivery delivered  Goal:	recovery  Assessment and plan of treatment:	follow up with Dr. Alonso in 2 weeks

## 2020-07-28 NOTE — DISCHARGE NOTE OB - PHYSICIAN SECTION COMPLETE
I reviewed the resident's note and agree. The patient is a 77-year-old female s/p fall down 6 steps on Tuesday complaining of persistent neck pain, found to have a Landel Type II C1 Angelito burst fracture and a Type II odontoid fracture. The patient denies any upper or lower extremity pain, numbness, tingling, or weakness. The patient denies any difficulty walking or bowel and bladder issues. An MRI of the cervical spine without contrast is pending. Continue a rigid cervical collar at all times for now. I reviewed the resident's note and agree. The patient is a 77-year-old female s/p fall down 6 steps on Tuesday complaining of persistent neck pain, found to have a Landel Type II C1 Angelito burst fracture and a mildly posteriorly displaced Type II odontoid fracture. The patient denies any upper or lower extremity pain, numbness, tingling, or weakness. The patient denies any difficulty walking or bowel and bladder issues. An MRI of the cervical spine without contrast is pending. Continue a rigid cervical collar at all times for now. I saw and evaluated the patient. I reviewed the resident's note and agree. The patient is a 77-year-old female s/p fall down 6 steps on Tuesday complaining of persistent neck pain, found to have a Landel Type II C1 Angelito burst fracture and a mildly posteriorly displaced Type II odontoid fracture. The patient denies any upper or lower extremity pain, numbness, tingling, or weakness. The patient denies any difficulty walking or bowel and bladder issues. An MRI of the cervical spine without contrast is pending. Continue a rigid cervical collar at all times for now. Yes

## 2020-07-28 NOTE — OB RN PATIENT PROFILE - PRESSURE ULCER(S)
02/20/17 1700   Maternal Information   Person Making Referral other (see comments)  (Courtesy visit. Reports baby nurses well with nipple shield.)   Pain Assessment/Intervention   Pain Rating (0-10): Rest 5   Pain Management Interventions medicated     
no

## 2020-07-28 NOTE — CHART NOTE - NSCHARTNOTEFT_GEN_A_CORE
PACU PA NOTE    Called by PACU RN due to slightly elevated bp, 130's-140's. Consistent w/ bp intra-partum and intra-op  Patient  s/p rltcs, PPRom in labor  QBL:754  EBL:800  IVF: 2800  UOP:150      Patient evaluated at bedside.   Pt denies any h/o elevated BP during or prior to pregnancy  She denies headache, dizziness, chest pain, palpitations, shortness of breath, nausea, vomiting or incisional pain  Reports pain is well controlled with present PACU meds    MEDICATIONS  (STANDING):  heparin   Injectable 29914 Unit(s) SubCutaneous every 12 hours  lactated ringers. 1000 milliLiter(s) (75 mL/Hr) IV Continuous <Continuous>  oxytocin Infusion 333.333 milliUNIT(s)/Min (1000 mL/Hr) IV Continuous <Continuous>    MEDICATIONS  (PRN):  oxyCODONE    IR 5 milliGRAM(s) Oral every 3 hours PRN Mild Pain (1 - 3)      PHYSICAL EXAM:  Vital Signs Last 24 Hrs  T(C): 36.4 (28 Jul 2020 12:25), Max: 37.4 (28 Jul 2020 07:14)  T(F): 97.5 (28 Jul 2020 12:25), Max: 99.32 (28 Jul 2020 07:24)  HR: 72 (28 Jul 2020 14:45) (66 - 107)  BP: 128/69 (28 Jul 2020 14:30) (97/72 - 149/99)  BP(mean): 83 (28 Jul 2020 14:30) (74 - 110)  RR: 17 (28 Jul 2020 14:45) (14 - 21)  SpO2: 100% (28 Jul 2020 14:45) (99% - 100%)  I&O's Summary    28 Jul 2020 07:01  -  28 Jul 2020 15:29  --------------------------------------------------------  IN: 3400 mL / OUT: 1119 mL / NET: 2281 mL        Gen: NAD, alert and oriented x3  Abd: uterus firm @ umbilicus/+fibroid on left side of fundus           dressing intact  :  lochia WNL          Owens catheter in place draining conc. urine( last po intake last night)      LABS:             9.7    16.28 )-----------( 404      ( 28 Jul 2020 08:27 )             30.9     PT/INR - ( 28 Jul 2020 08:27 )   PT: 11.6 SEC;   INR: 1.02        PTT - ( 28 Jul 2020 08:27 )  PTT:21.9 SEC      A/P:                 Cruz GLASS, PGY,4 advised           Continue PACU management           baseline HELLP labs  to be drawn w 1600 cbc  Keyona Galaviz PAC, C-EFM  07-28-20 @ 15:29

## 2020-07-28 NOTE — OB PROVIDER H&P - NSHPPHYSICALEXAM_GEN_ALL_CORE
Vital Signs Last 24 Hrs  T(C): 37.4 (28 Jul 2020 07:24), Max: 37.4 (28 Jul 2020 07:14)  T(F): 99.32 (28 Jul 2020 07:24), Max: 99.32 (28 Jul 2020 07:24)  HR: 107 (28 Jul 2020 07:21) (107 - 107)  BP: 131/73 (28 Jul 2020 07:21) (131/73 - 131/73)  BP(mean): --  RR: 18 (28 Jul 2020 07:14) (18 - 18)  SpO2: --    A&O x3  CTAB  normal RRR  abdomen: gravid, soft, nontender  sse: + pooling clear, + nitrazine + fern  sve 1/50/-3

## 2020-07-28 NOTE — OB RN PATIENT PROFILE - MENTAL HEALTH CONDITIONS/SYMPTOMS, PROFILE
self diagnosed 1 month ago, offered meds by Dr Alonso but pt doesn't want to take it/anxiety disorder

## 2020-07-28 NOTE — OB NEONATOLOGY/PEDIATRICIAN DELIVERY SUMMARY - NSPEDSNEONOTESA_OBGYN_ALL_OB_FT
Called to delivery of a 35.1 wk to a 29 y.o., , A+/GBS unknown (amp x1), all other PNL unremarkable. OBHx: C/S () - failure to dilate, TOP x1 (2014). Medhx: anxiety, fibroids and iron defieceny (last transfused 3/5- 1 unit).  Admited in labor, SROM 0630 with clear fluid. Delivered via repeat C/S, W/D/S/S, Called to delivery of a 35.1 wk to a 29 y.o., , A+/GBS unknown (amp x1), all other PNL unremarkable. OBHx: C/S () - failure to dilate, TOP x1 (). Medhx: anxiety, fibroids and iron defieceny (last transfused 3/5- 1 unit).  Admited in labor, SROM 0630 with clear fluid. Delivered via repeat C/S, W/D/S/S, delayed cord clamping done, Apgars 8/9.  Infant sent to NICU on room air for prematurity.  Peds= [facility]

## 2020-07-28 NOTE — BRIEF OPERATIVE NOTE - NSICDXBRIEFPREOP_GEN_ALL_CORE_FT
PRE-OP DIAGNOSIS:  Fibroid uterus 2020 15:46:24  Keyona Encarnacion  H/O  section 2020 15:45:49  Keyona Encarnacion  PROM (premature rupture of membranes) 2020 15:45:41  Keyona Encarnacion

## 2020-07-28 NOTE — DISCHARGE NOTE OB - PATIENT PORTAL LINK FT
You can access the FollowMyHealth Patient Portal offered by [facility] by registering at the following website: [email or web address]. By joining [facility]’s FollowMyHealth portal, you will also be able to view your health information using other applications (apps) compatible with our system.

## 2020-07-28 NOTE — OB PROVIDER H&P - ASSESSMENT
This is a 29 year old  at 35.1 weeks gestational age admitted for rpt c/s for pprom at 630am    plan discussed with dr leonidas galloway done  COVID 19 swab neg   T&C x 2 units  routine orders

## 2020-07-28 NOTE — OB RN PATIENT PROFILE - CURRENT PREGNANCY COMPLICATIONS, OB PROFILE
anemia/Gestational Age less than 36 Weeks/Other Maternal Unknown GBS/anemia/ Labor/Gestational Age less than 36 Weeks/Other

## 2020-07-28 NOTE — CHART NOTE - NSCHARTNOTEFT_GEN_A_CORE
PGY1 Evaluation Note    S:  Evaluated patient for low urine output. S/p 500cc bolus. Patient denies headache, RUQ pain, vision changes, chest pain, SOB, dizziness/lightheadedness, heavy vaginal bleeding or any other concerns.    O:  VS  T(C): 36.4 (20 @ 12:25)  HR: 94 (20 @ 17:45)  BP: 131/73 (20 @ 17:45)  RR: 20 (20 @ 17:45)  SpO2: 99% (20 @ 17:45)  Gen: NAD  Abdomen: fundus firm, discomfort with palpation  Lungs: clear to auscultation bilaterally  Vaginal: lochia wnl  : becerra draining orange urine    UOP/hr: 75-45-10-48    HELLP labs wnl  Hct 30.9->30.9    A/P:  30 y/o  POD#0 rLTCS with 754 cc EBL with inadequate urine output s/p 500cc bolus. HELLP labs wnl. Blood pressures have been slightly elevated, but not severe range. Fundus firm, hematocrit stable and no signs of bleeding.   - Administer 500cc bolus    D/w Dr. Minor-Mehran Trivedi PGY1

## 2020-07-28 NOTE — OB PROVIDER TRIAGE NOTE - NSOBPROVIDERNOTE_OBGYN_ALL_OB_FT
THis is a 29 year old  at 31.0 weeks admitted for pprom repeat c/s at 10 am    plan discussed with dr leonidas galloway performed with dr hall, dr kelly, anesthesia, ANM and charge nurse  NPO since 10 pm  COVID 19 swab negative from   T&C x 2 units  routine orders

## 2020-07-28 NOTE — OB PROVIDER H&P - HISTORY OF PRESENT ILLNESS
" I broke my water @ 0630 and having uterine contractions every 5 minutes "   FH- 140 @ 0789    This is a 29 year old patient of Dr Hernández  at 35.1 weeks gestational age presents with complaints of leaking of clear fluid since 630am. reports +gfm and contractions pain scale 7/10. Pt is scheduled for rpt c/s on . Last PO intake 10pm- chicken parm. denies fever, cough, chills, recent sick contact.  AP course complicated by:  - iron deficiency anemia. Received blood transfusion 2020, iron infusion x 2020  - most recent CBC  8.8/28.0  - GBS unknown

## 2020-07-28 NOTE — OB RN DELIVERY SUMMARY - NS_SEPSISRSKCALC_OBGYN_ALL_OB_FT
No temperature has been documented for this patient in CPN or on the OB Flowsheet. Ensure the highest temperature during labor was documented on the OB Flowsheet.  No gestational age at birth has been documented. Ensure delivery date/time has been entered above.  Rupture of membranes must be entered above. Rupture of membranes must be entered above.

## 2020-07-28 NOTE — OB PROVIDER TRIAGE NOTE - HISTORY OF PRESENT ILLNESS
" I broke my water @ 0630 and having uterine contractions every 5 minutes "   FH- 140 @ 3985 " I broke my water @ 0630 and having uterine contractions every 5 minutes "   FH- 140 @ 0718    This is a 29 year old patient of Dr Hernández  at 35.1 weeks gestational age presents with complaints of leaking of clear fluid since 630am. reports +gfm and contractions pain scale 7/10. Pt is scheduled for rpt c/s on . Last PO intake 10pm- chicken parm. denies fever, cough, chills, recent sick contact.  AP course complicated by:  - iron deficiency anemia. Received blood transfusion 2020, iron infusion x 2020  - most recent CBC  8.8/28.0  - GBS unknown

## 2020-07-28 NOTE — DISCHARGE NOTE OB - CARE PROVIDER_API CALL
Jairon Alonso  OBSTETRICS AND GYNECOLOGY  [address]  [place] [address]  Phone: [phone]  Fax: [phone]  Follow Up Time:

## 2020-07-28 NOTE — DISCHARGE NOTE OB - MEDICATION SUMMARY - MEDICATIONS TO TAKE
I will START or STAY ON the medications listed below when I get home from the hospital:    acetaminophen 325 mg oral tablet  -- 3 tab(s) by mouth   -- Indication: For  delivery delivered    ibuprofen 600 mg oral tablet  -- 1 tab(s) by mouth every 6 hours  -- Indication: For  delivery delivered    ferrous sulfate 325 mg (65 mg elemental iron) oral delayed release tablet  -- 1 tab(s) by mouth once a day  -- Indication: For  delivery delivered

## 2020-07-29 LAB
BASOPHILS # BLD AUTO: 0.04 K/UL — SIGNIFICANT CHANGE UP (ref 0–0.2)
BASOPHILS NFR BLD AUTO: 0.3 % — SIGNIFICANT CHANGE UP (ref 0–2)
EOSINOPHIL # BLD AUTO: 0.03 K/UL — SIGNIFICANT CHANGE UP (ref 0–0.5)
EOSINOPHIL NFR BLD AUTO: 0.2 % — SIGNIFICANT CHANGE UP (ref 0–6)
HCT VFR BLD CALC: 28.9 % — LOW (ref 34.5–45)
HGB BLD-MCNC: 8.7 G/DL — LOW (ref 11.5–15.5)
IMM GRANULOCYTES NFR BLD AUTO: 0.7 % — SIGNIFICANT CHANGE UP (ref 0–1.5)
LYMPHOCYTES # BLD AUTO: 15.5 % — SIGNIFICANT CHANGE UP (ref 13–44)
LYMPHOCYTES # BLD AUTO: 2.22 K/UL — SIGNIFICANT CHANGE UP (ref 1–3.3)
MCHC RBC-ENTMCNC: 23.5 PG — LOW (ref 27–34)
MCHC RBC-ENTMCNC: 30.1 % — LOW (ref 32–36)
MCV RBC AUTO: 78.1 FL — LOW (ref 80–100)
MONOCYTES # BLD AUTO: 1.15 K/UL — HIGH (ref 0–0.9)
MONOCYTES NFR BLD AUTO: 8 % — SIGNIFICANT CHANGE UP (ref 2–14)
NEUTROPHILS # BLD AUTO: 10.81 K/UL — HIGH (ref 1.8–7.4)
NEUTROPHILS NFR BLD AUTO: 75.3 % — SIGNIFICANT CHANGE UP (ref 43–77)
NRBC # FLD: 0 K/UL — SIGNIFICANT CHANGE UP (ref 0–0)
PLATELET # BLD AUTO: 351 K/UL — SIGNIFICANT CHANGE UP (ref 150–400)
PMV BLD: 11 FL — SIGNIFICANT CHANGE UP (ref 7–13)
RBC # BLD: 3.7 M/UL — LOW (ref 3.8–5.2)
RBC # FLD: 17.2 % — HIGH (ref 10.3–14.5)
WBC # BLD: 14.35 K/UL — HIGH (ref 3.8–10.5)
WBC # FLD AUTO: 14.35 K/UL — HIGH (ref 3.8–10.5)

## 2020-07-29 RX ORDER — OXYCODONE HYDROCHLORIDE 5 MG/1
5 TABLET ORAL ONCE
Refills: 0 | Status: DISCONTINUED | OUTPATIENT
Start: 2020-07-29 | End: 2020-07-30

## 2020-07-29 RX ORDER — IBUPROFEN 200 MG
600 TABLET ORAL EVERY 6 HOURS
Refills: 0 | Status: DISCONTINUED | OUTPATIENT
Start: 2020-07-29 | End: 2020-07-30

## 2020-07-29 RX ORDER — OXYCODONE HYDROCHLORIDE 5 MG/1
5 TABLET ORAL
Refills: 0 | Status: COMPLETED | OUTPATIENT
Start: 2020-07-29 | End: 2020-08-05

## 2020-07-29 RX ADMIN — OXYCODONE HYDROCHLORIDE 5 MILLIGRAM(S): 5 TABLET ORAL at 12:20

## 2020-07-29 RX ADMIN — Medication 975 MILLIGRAM(S): at 12:19

## 2020-07-29 RX ADMIN — HEPARIN SODIUM 10000 UNIT(S): 5000 INJECTION INTRAVENOUS; SUBCUTANEOUS at 17:43

## 2020-07-29 RX ADMIN — Medication 30 MILLIGRAM(S): at 06:09

## 2020-07-29 RX ADMIN — HEPARIN SODIUM 10000 UNIT(S): 5000 INJECTION INTRAVENOUS; SUBCUTANEOUS at 06:08

## 2020-07-29 RX ADMIN — Medication 600 MILLIGRAM(S): at 18:53

## 2020-07-29 RX ADMIN — Medication 30 MILLIGRAM(S): at 00:40

## 2020-07-29 RX ADMIN — OXYCODONE HYDROCHLORIDE 10 MILLIGRAM(S): 5 TABLET ORAL at 18:54

## 2020-07-29 RX ADMIN — Medication 600 MILLIGRAM(S): at 12:20

## 2020-07-29 RX ADMIN — Medication 975 MILLIGRAM(S): at 17:44

## 2020-07-29 RX ADMIN — Medication 600 MILLIGRAM(S): at 12:50

## 2020-07-29 RX ADMIN — Medication 600 MILLIGRAM(S): at 17:45

## 2020-07-29 RX ADMIN — Medication 975 MILLIGRAM(S): at 18:53

## 2020-07-29 RX ADMIN — Medication 30 MILLIGRAM(S): at 06:30

## 2020-07-29 RX ADMIN — OXYCODONE HYDROCHLORIDE 5 MILLIGRAM(S): 5 TABLET ORAL at 12:50

## 2020-07-29 RX ADMIN — OXYCODONE HYDROCHLORIDE 10 MILLIGRAM(S): 5 TABLET ORAL at 17:45

## 2020-07-29 RX ADMIN — Medication 975 MILLIGRAM(S): at 12:50

## 2020-07-29 RX ADMIN — Medication 30 MILLIGRAM(S): at 01:00

## 2020-07-29 NOTE — PROGRESS NOTE ADULT - SUBJECTIVE AND OBJECTIVE BOX
Patient seen and examined at bedside, no acute overnight events. No acute complaints, pain well controlled. Patient is ambulating and tolerating regular diet. Has not yet passed flatus. Voiding freely.     Vital Signs Last 24 Hours  T(C): 36.9 (07-29-20 @ 06:04), Max: 36.9 (07-28-20 @ 23:41)  HR: 94 (07-29-20 @ 06:04) (66 - 101)  BP: 134/68 (07-29-20 @ 06:04) (97/72 - 155/86)  RR: 18 (07-29-20 @ 06:04) (14 - 24)  SpO2: 100% (07-29-20 @ 06:04) (97% - 100%)    I&O's Summary    28 Jul 2020 07:01  -  29 Jul 2020 07:00  --------------------------------------------------------  IN: 4640 mL / OUT: 2487 mL / NET: 2153 mL        Physical Exam:  General: NAD  Abdomen: Soft, appropriately-tender, non-distended, fundus firm  Incision: Pfannenstiel incision CDI, subcuticular suture closure  Pelvic: Lochia wnl    Labs:    Blood Type: -- --  Antibody Screen: Negative  RPR: Negative               8.7    14.35 )-----------( 351      ( 07-29 @ 06:30 )             28.9                9.5    15.53 )-----------( 371      ( 07-28 @ 16:28 )             30.9                9.7    16.28 )-----------( 404      ( 07-28 @ 08:27 )             30.9         MEDICATIONS  (STANDING):  acetaminophen   Tablet .. 975 milliGRAM(s) Oral <User Schedule>  diphtheria/tetanus/pertussis (acellular) Vaccine (ADAcel) 0.5 milliLiter(s) IntraMuscular once  heparin   Injectable 88365 Unit(s) SubCutaneous every 12 hours  ibuprofen  Tablet. 600 milliGRAM(s) Oral every 6 hours  ketorolac   Injectable 30 milliGRAM(s) IV Push every 6 hours  lactated ringers. 1000 milliLiter(s) (125 mL/Hr) IV Continuous <Continuous>  lactated ringers. 1000 milliLiter(s) (125 mL/Hr) IV Continuous <Continuous>  oxytocin Infusion 333.333 milliUNIT(s)/Min (1000 mL/Hr) IV Continuous <Continuous>    MEDICATIONS  (PRN):  diphenhydrAMINE 25 milliGRAM(s) Oral every 6 hours PRN Itching  HYDROmorphone  Injectable 1 milliGRAM(s) IV Push every 3 hours PRN Severe Pain (7 - 10)  lanolin Ointment 1 Application(s) Topical every 6 hours PRN Sore Nipples  magnesium hydroxide Suspension 30 milliLiter(s) Oral two times a day PRN Constipation  oxyCODONE    IR 5 milliGRAM(s) Oral every 3 hours PRN Mild Pain (1 - 3)  oxyCODONE    IR 10 milliGRAM(s) Oral every 3 hours PRN Moderate Pain (4 - 6)  simethicone 80 milliGRAM(s) Chew every 4 hours PRN Gas

## 2020-07-29 NOTE — PROGRESS NOTE ADULT - ASSESSMENT
28y/o POD#1 from rLTCS with EBL of 754. PMHx significant for obesity and anemia requiring transition from the past. H/H 8.7/28.9. No active issues.

## 2020-07-29 NOTE — PROGRESS NOTE ADULT - SUBJECTIVE AND OBJECTIVE BOX
Postop Day  __1_ s/p   C- Section    THERAPY:  [ x ] Spinal morphine   [  ] Epidural morphine   [  ] IV PCA Hydromorphone 1 mg/ml    acetaminophen   Tablet .. 975 milliGRAM(s) Oral <User Schedule>  diphenhydrAMINE 25 milliGRAM(s) Oral every 6 hours PRN  diphtheria/tetanus/pertussis (acellular) Vaccine (ADAcel) 0.5 milliLiter(s) IntraMuscular once  heparin   Injectable 11781 Unit(s) SubCutaneous every 12 hours  HYDROmorphone  Injectable 1 milliGRAM(s) IV Push every 3 hours PRN  ibuprofen  Tablet. 600 milliGRAM(s) Oral every 6 hours  ketorolac   Injectable 30 milliGRAM(s) IV Push every 6 hours  lactated ringers. 1000 milliLiter(s) IV Continuous <Continuous>  lactated ringers. 1000 milliLiter(s) IV Continuous <Continuous>  lanolin Ointment 1 Application(s) Topical every 6 hours PRN  magnesium hydroxide Suspension 30 milliLiter(s) Oral two times a day PRN  oxyCODONE    IR 5 milliGRAM(s) Oral every 3 hours PRN  oxyCODONE    IR 10 milliGRAM(s) Oral every 3 hours PRN  oxytocin Infusion 333.333 milliUNIT(s)/Min IV Continuous <Continuous>  simethicone 80 milliGRAM(s) Chew every 4 hours PRN      T(C): 36.9 (07-29-20 @ 06:04), Max: 36.9 (07-28-20 @ 23:41)  HR: 94 (07-29-20 @ 06:04) (66 - 94)  BP: 134/68 (07-29-20 @ 06:04) (97/72 - 155/86)  RR: 18 (07-29-20 @ 06:04) (14 - 24)  SpO2: 100% (07-29-20 @ 06:04) (97% - 100%)    Pain:   ______mild_____ at rest;  _____moderate______with activity    Sedation Score:	  [ x ] Alert	    [  ] Drowsy        [  ] Arousable	[  ] Asleep	[  ] Unresponsive    Side Effects:	  [  ] None	     [  ] Nausea        [ x ] Pruritus        [  ] Weakness   [  ] Numbness        ASSESSMENT/ PLAN  [  x ] Side effects resolving      [  x ] Patient made aware of PRN meds available     [ x] Discontinue & switch to PRN pain medications        [  ] Continue       Patient states lower extremities feel and move normally. No apparent anesthetic complications.

## 2020-07-29 NOTE — PROGRESS NOTE ADULT - SUBJECTIVE AND OBJECTIVE BOX
ANESTHESIA POSTOP CHECK    29y Female POSTOP DAY 1     Vital Signs Last 24 Hrs  T(C): 36.9 (29 Jul 2020 06:04), Max: 36.9 (28 Jul 2020 23:41)  T(F): 98.4 (29 Jul 2020 06:04), Max: 98.4 (28 Jul 2020 23:41)  HR: 94 (29 Jul 2020 06:04) (66 - 94)  BP: 134/68 (29 Jul 2020 06:04) (97/72 - 155/86)  BP(mean): 84 (28 Jul 2020 20:00) (74 - 110)  RR: 18 (29 Jul 2020 06:04) (14 - 24)  SpO2: 100% (29 Jul 2020 06:04) (97% - 100%)  I&O's Summary    28 Jul 2020 07:01  -  29 Jul 2020 07:00  --------------------------------------------------------  IN: 4640 mL / OUT: 2487 mL / NET: 2153 mL        [X ] NO APPARENT ANESTHESIA COMPLICATIONS      Comments:

## 2020-07-29 NOTE — LACTATION INITIAL EVALUATION - LACTATION INTERVENTIONS
initiate skin to skin/assisted to put baby to left breast in football hold position with rolled up receiving blanket under pendulous breast for support.  Baby does not sustain latch.  Pt. taught hand expression and encouraged to continue to attempt to get baby to sustain latch and manually express colostrum and/or pump and feed with feeding syringe and supplement with formula

## 2020-07-29 NOTE — PROGRESS NOTE ADULT - PROBLEM SELECTOR PLAN 1
- Continue with po analgesia  - Increase ambulation  - Continue regular diet  - H/H stable    Lisa Hernandez  PGY-1

## 2020-07-30 VITALS
OXYGEN SATURATION: 99 % | TEMPERATURE: 98 F | SYSTOLIC BLOOD PRESSURE: 123 MMHG | RESPIRATION RATE: 18 BRPM | HEART RATE: 88 BPM | DIASTOLIC BLOOD PRESSURE: 75 MMHG

## 2020-07-30 RX ORDER — IBUPROFEN 200 MG
1 TABLET ORAL
Qty: 0 | Refills: 0 | DISCHARGE
Start: 2020-07-30

## 2020-07-30 RX ORDER — ACETAMINOPHEN 500 MG
3 TABLET ORAL
Qty: 0 | Refills: 0 | DISCHARGE
Start: 2020-07-30

## 2020-07-30 RX ORDER — OXYCODONE HYDROCHLORIDE 5 MG/1
5 TABLET ORAL
Refills: 0 | Status: DISCONTINUED | OUTPATIENT
Start: 2020-07-30 | End: 2020-07-30

## 2020-07-30 RX ADMIN — Medication 975 MILLIGRAM(S): at 12:35

## 2020-07-30 RX ADMIN — Medication 600 MILLIGRAM(S): at 12:37

## 2020-07-30 RX ADMIN — Medication 600 MILLIGRAM(S): at 06:12

## 2020-07-30 RX ADMIN — Medication 600 MILLIGRAM(S): at 07:00

## 2020-07-30 RX ADMIN — SIMETHICONE 80 MILLIGRAM(S): 80 TABLET, CHEWABLE ORAL at 09:25

## 2020-07-30 RX ADMIN — Medication 600 MILLIGRAM(S): at 00:18

## 2020-07-30 RX ADMIN — OXYCODONE HYDROCHLORIDE 5 MILLIGRAM(S): 5 TABLET ORAL at 02:54

## 2020-07-30 RX ADMIN — Medication 975 MILLIGRAM(S): at 01:00

## 2020-07-30 RX ADMIN — OXYCODONE HYDROCHLORIDE 5 MILLIGRAM(S): 5 TABLET ORAL at 09:25

## 2020-07-30 RX ADMIN — OXYCODONE HYDROCHLORIDE 5 MILLIGRAM(S): 5 TABLET ORAL at 09:50

## 2020-07-30 RX ADMIN — OXYCODONE HYDROCHLORIDE 5 MILLIGRAM(S): 5 TABLET ORAL at 03:30

## 2020-07-30 RX ADMIN — Medication 975 MILLIGRAM(S): at 00:18

## 2020-07-30 RX ADMIN — Medication 975 MILLIGRAM(S): at 06:11

## 2020-07-30 RX ADMIN — Medication 600 MILLIGRAM(S): at 01:00

## 2020-07-30 RX ADMIN — HEPARIN SODIUM 10000 UNIT(S): 5000 INJECTION INTRAVENOUS; SUBCUTANEOUS at 06:12

## 2020-07-30 RX ADMIN — Medication 975 MILLIGRAM(S): at 07:00

## 2020-07-30 RX ADMIN — Medication 600 MILLIGRAM(S): at 12:35

## 2020-07-30 RX ADMIN — Medication 975 MILLIGRAM(S): at 12:37

## 2020-07-30 NOTE — PROGRESS NOTE ADULT - SUBJECTIVE AND OBJECTIVE BOX
OB Attending Progress Note: TLCS, POD#2    S: 30yo POD#2 s/p LTCS. Her pain is well controlled. She is tolerating a regular diet and passing flatus. Voiding spontaneously. Denies N/V/D. Denies CP/SOB/lightheadedness/dizziness. She is breastfeeding.    O:  Vitals:  Vital Signs Last 24 Hrs  T(C): 36.7 (30 Jul 2020 09:43), Max: 37.1 (29 Jul 2020 17:55)  T(F): 98 (30 Jul 2020 09:43), Max: 98.7 (29 Jul 2020 17:55)  HR: 83 (30 Jul 2020 09:43) (80 - 91)  BP: 130/78 (30 Jul 2020 09:43) (121/72 - 130/78)  BP(mean): --  RR: 17 (30 Jul 2020 09:43) (17 - 18)  SpO2: 98% (30 Jul 2020 09:43) (98% - 100%)    MEDICATIONS  (STANDING):  acetaminophen   Tablet .. 975 milliGRAM(s) Oral <User Schedule>  diphtheria/tetanus/pertussis (acellular) Vaccine (ADAcel) 0.5 milliLiter(s) IntraMuscular once  heparin   Injectable 28006 Unit(s) SubCutaneous every 12 hours  ibuprofen  Tablet. 600 milliGRAM(s) Oral every 6 hours  ibuprofen  Tablet. 600 milliGRAM(s) Oral every 6 hours      MEDICATIONS  (PRN):  diphenhydrAMINE 25 milliGRAM(s) Oral every 6 hours PRN Itching  lanolin Ointment 1 Application(s) Topical every 6 hours PRN Sore Nipples  magnesium hydroxide Suspension 30 milliLiter(s) Oral two times a day PRN Constipation  oxyCODONE    IR 5 milliGRAM(s) Oral once PRN Moderate to Severe Pain (4-10)  oxyCODONE    IR 5 milliGRAM(s) Oral every 3 hours PRN Moderate to Severe Pain (4-10)  simethicone 80 milliGRAM(s) Chew every 4 hours PRN Gas      Labs:  Blood type: A Positive  Rubella IgG: RPR: Negative                          8.7<L>   14.35<H> >-----------< 351    ( 07-29 @ 06:30 )             28.9<L>                        9.5<L>   15.53<H> >-----------< 371    ( 07-28 @ 16:28 )             30.9<L>                        9.7<L>   16.28<H> >-----------< 404<H>    ( 07-28 @ 08:27 )             30.9<L>    07-28-20 @ 16:28      132<L>  |  100  |  4<L>  ----------------------------<  88  4.2   |  21<L>  |  0.44<L>        Ca    8.8      28 Jul 2020 16:28    TPro  6.5  /  Alb  3.1<L>  /  TBili  0.4  /  DBili  x   /  AST  11  /  ALT  8   /  AlkPhos  88  07-28-20 @ 16:28          PE:  General: NAD  CV: RR  Pulm: Breathing comfortably on RA  Abdomen: Soft, appropriately tender, incision c/d/i with steris  Extremities: No erythema, no pitting edema    A/P: 30yo POD#2 s/p LTCS.  - Continue regular diet.  - Increase ambulation.  - Continue motrin, tylenol, oxycodone PRN for pain control.  - Discharge planning     Kristy Tang MD

## 2020-07-31 ENCOUNTER — APPOINTMENT (OUTPATIENT)
Dept: INFUSION THERAPY | Facility: HOSPITAL | Age: 30
End: 2020-07-31

## 2020-08-05 ENCOUNTER — APPOINTMENT (OUTPATIENT)
Dept: INFUSION THERAPY | Facility: HOSPITAL | Age: 30
End: 2020-08-05

## 2020-08-07 ENCOUNTER — APPOINTMENT (OUTPATIENT)
Dept: INFUSION THERAPY | Facility: HOSPITAL | Age: 30
End: 2020-08-07

## 2020-08-08 LAB — SARS-COV-2 N GENE NPH QL NAA+PROBE: NOT DETECTED

## 2020-08-11 ENCOUNTER — INPATIENT (INPATIENT)
Facility: HOSPITAL | Age: 30
LOS: 1 days | Discharge: HOME | End: 2020-08-13
Attending: OBSTETRICS & GYNECOLOGY | Admitting: OBSTETRICS & GYNECOLOGY
Payer: COMMERCIAL

## 2020-08-11 ENCOUNTER — APPOINTMENT (OUTPATIENT)
Dept: OBGYN | Facility: CLINIC | Age: 30
End: 2020-08-11
Payer: COMMERCIAL

## 2020-08-11 VITALS — HEART RATE: 77 BPM | TEMPERATURE: 100 F | DIASTOLIC BLOOD PRESSURE: 80 MMHG | SYSTOLIC BLOOD PRESSURE: 135 MMHG

## 2020-08-11 DIAGNOSIS — Z98.890 OTHER SPECIFIED POSTPROCEDURAL STATES: Chronic | ICD-10-CM

## 2020-08-11 DIAGNOSIS — O14.90 UNSPECIFIED PRE-ECLAMPSIA, UNSPECIFIED TRIMESTER: ICD-10-CM

## 2020-08-11 DIAGNOSIS — Z98.891 HISTORY OF UTERINE SCAR FROM PREVIOUS SURGERY: Chronic | ICD-10-CM

## 2020-08-11 LAB
ALBUMIN SERPL ELPH-MCNC: 3.9 G/DL — SIGNIFICANT CHANGE UP (ref 3.3–5)
ALP SERPL-CCNC: 77 U/L — SIGNIFICANT CHANGE UP (ref 40–120)
ALT FLD-CCNC: 11 U/L — SIGNIFICANT CHANGE UP (ref 4–33)
ANION GAP SERPL CALC-SCNC: 12 MMO/L — SIGNIFICANT CHANGE UP (ref 7–14)
APPEARANCE UR: SIGNIFICANT CHANGE UP
APTT BLD: 30.9 SEC — SIGNIFICANT CHANGE UP (ref 27–36.3)
AST SERPL-CCNC: 16 U/L — SIGNIFICANT CHANGE UP (ref 4–32)
BACTERIA # UR AUTO: HIGH
BASOPHILS # BLD AUTO: 0.04 K/UL — SIGNIFICANT CHANGE UP (ref 0–0.2)
BASOPHILS NFR BLD AUTO: 0.5 % — SIGNIFICANT CHANGE UP (ref 0–2)
BILIRUB SERPL-MCNC: 0.3 MG/DL — SIGNIFICANT CHANGE UP (ref 0.2–1.2)
BILIRUB UR-MCNC: NEGATIVE — SIGNIFICANT CHANGE UP
BLOOD UR QL VISUAL: HIGH
BUN SERPL-MCNC: 11 MG/DL — SIGNIFICANT CHANGE UP (ref 7–23)
CALCIUM SERPL-MCNC: 9.8 MG/DL — SIGNIFICANT CHANGE UP (ref 8.4–10.5)
CHLORIDE SERPL-SCNC: 102 MMOL/L — SIGNIFICANT CHANGE UP (ref 98–107)
CO2 SERPL-SCNC: 25 MMOL/L — SIGNIFICANT CHANGE UP (ref 22–31)
COLOR SPEC: YELLOW — SIGNIFICANT CHANGE UP
CREAT ?TM UR-MCNC: 257.3 MG/DL — SIGNIFICANT CHANGE UP
CREAT SERPL-MCNC: 0.72 MG/DL — SIGNIFICANT CHANGE UP (ref 0.5–1.3)
EOSINOPHIL # BLD AUTO: 0.09 K/UL — SIGNIFICANT CHANGE UP (ref 0–0.5)
EOSINOPHIL NFR BLD AUTO: 1.1 % — SIGNIFICANT CHANGE UP (ref 0–6)
EPI CELLS # UR: SIGNIFICANT CHANGE UP
FIBRINOGEN PPP-MCNC: 723 MG/DL — HIGH (ref 290–520)
GLUCOSE SERPL-MCNC: 81 MG/DL — SIGNIFICANT CHANGE UP (ref 70–99)
GLUCOSE UR-MCNC: NEGATIVE — SIGNIFICANT CHANGE UP
HCT VFR BLD CALC: 34.1 % — LOW (ref 34.5–45)
HGB BLD-MCNC: 10.2 G/DL — LOW (ref 11.5–15.5)
IMM GRANULOCYTES NFR BLD AUTO: 0.4 % — SIGNIFICANT CHANGE UP (ref 0–1.5)
INR BLD: 1.15 — SIGNIFICANT CHANGE UP (ref 0.88–1.16)
KETONES UR-MCNC: NEGATIVE — SIGNIFICANT CHANGE UP
LDH SERPL L TO P-CCNC: 254 U/L — HIGH (ref 135–225)
LEUKOCYTE ESTERASE UR-ACNC: SIGNIFICANT CHANGE UP
LYMPHOCYTES # BLD AUTO: 2.45 K/UL — SIGNIFICANT CHANGE UP (ref 1–3.3)
LYMPHOCYTES # BLD AUTO: 29.6 % — SIGNIFICANT CHANGE UP (ref 13–44)
MAGNESIUM SERPL-MCNC: 4.3 MG/DL — HIGH (ref 1.6–2.6)
MCHC RBC-ENTMCNC: 22.7 PG — LOW (ref 27–34)
MCHC RBC-ENTMCNC: 29.9 % — LOW (ref 32–36)
MCV RBC AUTO: 75.9 FL — LOW (ref 80–100)
MONOCYTES # BLD AUTO: 0.46 K/UL — SIGNIFICANT CHANGE UP (ref 0–0.9)
MONOCYTES NFR BLD AUTO: 5.6 % — SIGNIFICANT CHANGE UP (ref 2–14)
NEUTROPHILS # BLD AUTO: 5.21 K/UL — SIGNIFICANT CHANGE UP (ref 1.8–7.4)
NEUTROPHILS NFR BLD AUTO: 62.8 % — SIGNIFICANT CHANGE UP (ref 43–77)
NITRITE UR-MCNC: NEGATIVE — SIGNIFICANT CHANGE UP
NRBC # FLD: 0 K/UL — SIGNIFICANT CHANGE UP (ref 0–0)
PH UR: 6 — SIGNIFICANT CHANGE UP (ref 5–8)
PLATELET # BLD AUTO: 563 K/UL — HIGH (ref 150–400)
PMV BLD: 10.4 FL — SIGNIFICANT CHANGE UP (ref 7–13)
POTASSIUM SERPL-MCNC: 4 MMOL/L — SIGNIFICANT CHANGE UP (ref 3.5–5.3)
POTASSIUM SERPL-SCNC: 4 MMOL/L — SIGNIFICANT CHANGE UP (ref 3.5–5.3)
PROT SERPL-MCNC: 7.8 G/DL — SIGNIFICANT CHANGE UP (ref 6–8.3)
PROT UR-MCNC: 42.1 MG/DL — SIGNIFICANT CHANGE UP
PROT UR-MCNC: 50 — SIGNIFICANT CHANGE UP
PROTHROM AB SERPL-ACNC: 13 SEC — SIGNIFICANT CHANGE UP (ref 10.6–13.6)
RBC # BLD: 4.49 M/UL — SIGNIFICANT CHANGE UP (ref 3.8–5.2)
RBC # FLD: 16.4 % — HIGH (ref 10.3–14.5)
RBC CASTS # UR COMP ASSIST: HIGH (ref 0–?)
SARS-COV-2 RNA SPEC QL NAA+PROBE: SIGNIFICANT CHANGE UP
SODIUM SERPL-SCNC: 139 MMOL/L — SIGNIFICANT CHANGE UP (ref 135–145)
SP GR SPEC: 1.03 — SIGNIFICANT CHANGE UP (ref 1–1.04)
URATE SERPL-MCNC: 7.9 MG/DL — HIGH (ref 2.5–7)
UROBILINOGEN FLD QL: NORMAL — SIGNIFICANT CHANGE UP
WBC # BLD: 8.28 K/UL — SIGNIFICANT CHANGE UP (ref 3.8–10.5)
WBC # FLD AUTO: 8.28 K/UL — SIGNIFICANT CHANGE UP (ref 3.8–10.5)
WBC UR QL: HIGH (ref 0–?)

## 2020-08-11 PROCEDURE — 86077 PHYS BLOOD BANK SERV XMATCH: CPT

## 2020-08-11 PROCEDURE — 0503F POSTPARTUM CARE VISIT: CPT

## 2020-08-11 PROCEDURE — 99221 1ST HOSP IP/OBS SF/LOW 40: CPT | Mod: GC

## 2020-08-11 RX ORDER — MAGNESIUM SULFATE 500 MG/ML
4 VIAL (ML) INJECTION ONCE
Refills: 0 | Status: COMPLETED | OUTPATIENT
Start: 2020-08-11 | End: 2020-08-11

## 2020-08-11 RX ORDER — SODIUM CHLORIDE 9 MG/ML
1000 INJECTION, SOLUTION INTRAVENOUS
Refills: 0 | Status: DISCONTINUED | OUTPATIENT
Start: 2020-08-11 | End: 2020-08-12

## 2020-08-11 RX ORDER — ACETAMINOPHEN 500 MG
975 TABLET ORAL EVERY 6 HOURS
Refills: 0 | Status: DISCONTINUED | OUTPATIENT
Start: 2020-08-11 | End: 2020-08-13

## 2020-08-11 RX ORDER — NIFEDIPINE 30 MG
30 TABLET, EXTENDED RELEASE 24 HR ORAL DAILY
Refills: 0 | Status: DISCONTINUED | OUTPATIENT
Start: 2020-08-11 | End: 2020-08-13

## 2020-08-11 RX ORDER — MAGNESIUM SULFATE 500 MG/ML
2 VIAL (ML) INJECTION
Qty: 40 | Refills: 0 | Status: DISCONTINUED | OUTPATIENT
Start: 2020-08-11 | End: 2020-08-12

## 2020-08-11 RX ORDER — ASCORBIC ACID 60 MG
500 TABLET,CHEWABLE ORAL DAILY
Refills: 0 | Status: DISCONTINUED | OUTPATIENT
Start: 2020-08-11 | End: 2020-08-13

## 2020-08-11 RX ORDER — HYDRALAZINE HCL 50 MG
10 TABLET ORAL ONCE
Refills: 0 | Status: COMPLETED | OUTPATIENT
Start: 2020-08-11 | End: 2020-08-11

## 2020-08-11 RX ORDER — SODIUM CHLORIDE 9 MG/ML
1000 INJECTION, SOLUTION INTRAVENOUS
Refills: 0 | Status: DISCONTINUED | OUTPATIENT
Start: 2020-08-11 | End: 2020-08-11

## 2020-08-11 RX ORDER — MAGNESIUM HYDROXIDE 400 MG/1
30 TABLET, CHEWABLE ORAL DAILY
Refills: 0 | Status: DISCONTINUED | OUTPATIENT
Start: 2020-08-11 | End: 2020-08-13

## 2020-08-11 RX ORDER — FERROUS SULFATE 325(65) MG
325 TABLET ORAL DAILY
Refills: 0 | Status: DISCONTINUED | OUTPATIENT
Start: 2020-08-11 | End: 2020-08-13

## 2020-08-11 RX ORDER — MAGNESIUM SULFATE 500 MG/ML
2 VIAL (ML) INJECTION
Qty: 40 | Refills: 0 | Status: DISCONTINUED | OUTPATIENT
Start: 2020-08-11 | End: 2020-08-11

## 2020-08-11 RX ADMIN — Medication 975 MILLIGRAM(S): at 19:13

## 2020-08-11 RX ADMIN — Medication 50 GM/HR: at 19:16

## 2020-08-11 RX ADMIN — SODIUM CHLORIDE 50 MILLILITER(S): 9 INJECTION, SOLUTION INTRAVENOUS at 16:44

## 2020-08-11 RX ADMIN — Medication 200 GRAM(S): at 16:39

## 2020-08-11 RX ADMIN — Medication 975 MILLIGRAM(S): at 18:30

## 2020-08-11 RX ADMIN — Medication 30 MILLIGRAM(S): at 17:03

## 2020-08-11 RX ADMIN — Medication 50 GM/HR: at 17:06

## 2020-08-11 RX ADMIN — Medication 10 MILLIGRAM(S): at 16:33

## 2020-08-11 NOTE — HISTORY OF PRESENT ILLNESS
[Postpartum Follow Up] : postpartum follow up [Delivery Date: ___] : on [unfilled] [Repeat C/S] : delivered by  section (repeat) [Female] : Delivery History: baby girl [Wt. ___] : weighing [unfilled] [Clean/Dry/Intact] : clean, dry and intact [Healed] : healed [Mild] : mild vaginal bleeding [Normal] : the vagina was normal [Not Done] : Examination of breasts not done [Doing Well] : is doing well [None] : None [Excellent Pain Control] : has excellent pain control [No Sign of Infection] : is showing no signs of infection [Complications:___] : no complications [S/Sx PP Depression] : no signs/symptoms of postpartum depression [Breastfeeding] : not currently nursing [Cervix Sample Taken] : cervical sample not taken for a Pap smear [Erythema] : not erythematous [de-identified] : /105, HA x 2 days. Directed to L and D for r/o PEC [de-identified] : HA x 2 days

## 2020-08-11 NOTE — H&P ADULT - PROBLEM SELECTOR PLAN 1
Evidence of post partum pre-eclampsia   - admit to labor and delivery to stabilize  - for Magnesium Sulfate  - Procardia 30 XL ordered   - discussed with

## 2020-08-11 NOTE — H&P ADULT - NSICDXPASTMEDICALHX_GEN_ALL_CORE_FT
PAST MEDICAL HISTORY:  Anemia iron def anemia  iron transfusions started end of feb and transfused x 1 unit 3/5    Fibroids     History of termination of pregnancy 2014

## 2020-08-11 NOTE — H&P ADULT - NSHPPHYSICALEXAM_GEN_ALL_CORE
Blood pressures:   -181/94  -163/70  -172/77  Lungs: clear to ausclatation, bilaterally  Reflexes: normal

## 2020-08-11 NOTE — H&P ADULT - HISTORY OF PRESENT ILLNESS
's patient is a 30 y/o s/p repeat c/s with blood pressure of 170/105 in office. Patient reports of headache, visual disturbances and pain under the right breast. Patient reports of her blood pressure being elevated in the PACU. Blood pressure review indicates labile blood pressures, ( 7/28/2020 140s-150s/80s.) Patient denies history of chronic hypertension. Mom reports patient seems more anxious and depressed. Patient does not reports of symptoms.     Medical: Denies  OBGYN/Surgical History:  11/16/2007 arrest of dilation 8lb  7/28/2020 5lb at 35 EGA, premature rupture of membrane

## 2020-08-11 NOTE — H&P ADULT - NSICDXPASTSURGICALHX_GEN_ALL_CORE_FT
PAST SURGICAL HISTORY:  H/O:  section  m failure to dilate 8lbs    History of dilation and curettage 2014

## 2020-08-12 ENCOUNTER — APPOINTMENT (OUTPATIENT)
Dept: INFUSION THERAPY | Facility: HOSPITAL | Age: 30
End: 2020-08-12

## 2020-08-12 PROBLEM — D21.9 BENIGN NEOPLASM OF CONNECTIVE AND OTHER SOFT TISSUE, UNSPECIFIED: Chronic | Status: ACTIVE | Noted: 2020-07-07

## 2020-08-12 LAB
ALBUMIN SERPL ELPH-MCNC: 3.3 G/DL — SIGNIFICANT CHANGE UP (ref 3.3–5)
ALP SERPL-CCNC: 68 U/L — SIGNIFICANT CHANGE UP (ref 40–120)
ALT FLD-CCNC: 11 U/L — SIGNIFICANT CHANGE UP (ref 4–33)
ANION GAP SERPL CALC-SCNC: 15 MMO/L — HIGH (ref 7–14)
ANTIBODY ID 1_1: SIGNIFICANT CHANGE UP
APTT BLD: 29 SEC — SIGNIFICANT CHANGE UP (ref 27–36.3)
AST SERPL-CCNC: 15 U/L — SIGNIFICANT CHANGE UP (ref 4–32)
BASOPHILS # BLD AUTO: 0.04 K/UL — SIGNIFICANT CHANGE UP (ref 0–0.2)
BASOPHILS NFR BLD AUTO: 0.5 % — SIGNIFICANT CHANGE UP (ref 0–2)
BILIRUB SERPL-MCNC: < 0.2 MG/DL — LOW (ref 0.2–1.2)
BLD GP AB SCN SERPL QL: POSITIVE — SIGNIFICANT CHANGE UP
BUN SERPL-MCNC: 10 MG/DL — SIGNIFICANT CHANGE UP (ref 7–23)
CALCIUM SERPL-MCNC: 8.2 MG/DL — LOW (ref 8.4–10.5)
CHLORIDE SERPL-SCNC: 102 MMOL/L — SIGNIFICANT CHANGE UP (ref 98–107)
CO2 SERPL-SCNC: 22 MMOL/L — SIGNIFICANT CHANGE UP (ref 22–31)
CREAT SERPL-MCNC: 0.76 MG/DL — SIGNIFICANT CHANGE UP (ref 0.5–1.3)
DAT POLY-SP REAG RBC QL: NEGATIVE — SIGNIFICANT CHANGE UP
EOSINOPHIL # BLD AUTO: 0.12 K/UL — SIGNIFICANT CHANGE UP (ref 0–0.5)
EOSINOPHIL NFR BLD AUTO: 1.4 % — SIGNIFICANT CHANGE UP (ref 0–6)
FIBRINOGEN PPP-MCNC: 630 MG/DL — HIGH (ref 290–520)
GLUCOSE SERPL-MCNC: 98 MG/DL — SIGNIFICANT CHANGE UP (ref 70–99)
HCT VFR BLD CALC: 31.3 % — LOW (ref 34.5–45)
HGB BLD-MCNC: 9.5 G/DL — LOW (ref 11.5–15.5)
IMM GRANULOCYTES NFR BLD AUTO: 0.2 % — SIGNIFICANT CHANGE UP (ref 0–1.5)
INR BLD: 1.01 — SIGNIFICANT CHANGE UP (ref 0.88–1.16)
LDH SERPL L TO P-CCNC: 214 U/L — SIGNIFICANT CHANGE UP (ref 135–225)
LYMPHOCYTES # BLD AUTO: 2.56 K/UL — SIGNIFICANT CHANGE UP (ref 1–3.3)
LYMPHOCYTES # BLD AUTO: 29.9 % — SIGNIFICANT CHANGE UP (ref 13–44)
MAGNESIUM SERPL-MCNC: 5 MG/DL — HIGH (ref 1.6–2.6)
MAGNESIUM SERPL-MCNC: 5.5 MG/DL — HIGH (ref 1.6–2.6)
MCHC RBC-ENTMCNC: 23.2 PG — LOW (ref 27–34)
MCHC RBC-ENTMCNC: 30.4 % — LOW (ref 32–36)
MCV RBC AUTO: 76.5 FL — LOW (ref 80–100)
MONOCYTES # BLD AUTO: 0.62 K/UL — SIGNIFICANT CHANGE UP (ref 0–0.9)
MONOCYTES NFR BLD AUTO: 7.2 % — SIGNIFICANT CHANGE UP (ref 2–14)
NEUTROPHILS # BLD AUTO: 5.21 K/UL — SIGNIFICANT CHANGE UP (ref 1.8–7.4)
NEUTROPHILS NFR BLD AUTO: 60.8 % — SIGNIFICANT CHANGE UP (ref 43–77)
NRBC # FLD: 0 K/UL — SIGNIFICANT CHANGE UP (ref 0–0)
PLATELET # BLD AUTO: 552 K/UL — HIGH (ref 150–400)
PMV BLD: 10.9 FL — SIGNIFICANT CHANGE UP (ref 7–13)
POTASSIUM SERPL-MCNC: 3.6 MMOL/L — SIGNIFICANT CHANGE UP (ref 3.5–5.3)
POTASSIUM SERPL-SCNC: 3.6 MMOL/L — SIGNIFICANT CHANGE UP (ref 3.5–5.3)
PROT SERPL-MCNC: 6.6 G/DL — SIGNIFICANT CHANGE UP (ref 6–8.3)
PROTHROM AB SERPL-ACNC: 11.6 SEC — SIGNIFICANT CHANGE UP (ref 10.6–13.6)
RBC # BLD: 4.09 M/UL — SIGNIFICANT CHANGE UP (ref 3.8–5.2)
RBC # FLD: 16.5 % — HIGH (ref 10.3–14.5)
RH IG SCN BLD-IMP: POSITIVE — SIGNIFICANT CHANGE UP
SODIUM SERPL-SCNC: 139 MMOL/L — SIGNIFICANT CHANGE UP (ref 135–145)
URATE SERPL-MCNC: 7.9 MG/DL — HIGH (ref 2.5–7)
WBC # BLD: 8.57 K/UL — SIGNIFICANT CHANGE UP (ref 3.8–10.5)
WBC # FLD AUTO: 8.57 K/UL — SIGNIFICANT CHANGE UP (ref 3.8–10.5)

## 2020-08-12 PROCEDURE — 99231 SBSQ HOSP IP/OBS SF/LOW 25: CPT | Mod: GC

## 2020-08-12 RX ORDER — HEPARIN SODIUM 5000 [USP'U]/ML
5000 INJECTION INTRAVENOUS; SUBCUTANEOUS EVERY 12 HOURS
Refills: 0 | Status: DISCONTINUED | OUTPATIENT
Start: 2020-08-12 | End: 2020-08-13

## 2020-08-12 RX ORDER — SODIUM CHLORIDE 9 MG/ML
3 INJECTION INTRAMUSCULAR; INTRAVENOUS; SUBCUTANEOUS EVERY 8 HOURS
Refills: 0 | Status: DISCONTINUED | OUTPATIENT
Start: 2020-08-12 | End: 2020-08-13

## 2020-08-12 RX ADMIN — Medication 30 MILLIGRAM(S): at 16:50

## 2020-08-12 RX ADMIN — Medication 325 MILLIGRAM(S): at 13:14

## 2020-08-12 RX ADMIN — Medication 975 MILLIGRAM(S): at 19:06

## 2020-08-12 RX ADMIN — Medication 975 MILLIGRAM(S): at 13:56

## 2020-08-12 RX ADMIN — Medication 975 MILLIGRAM(S): at 07:20

## 2020-08-12 RX ADMIN — Medication 50 GM/HR: at 07:10

## 2020-08-12 RX ADMIN — Medication 975 MILLIGRAM(S): at 01:44

## 2020-08-12 RX ADMIN — Medication 975 MILLIGRAM(S): at 13:14

## 2020-08-12 RX ADMIN — Medication 975 MILLIGRAM(S): at 02:30

## 2020-08-12 RX ADMIN — HEPARIN SODIUM 5000 UNIT(S): 5000 INJECTION INTRAVENOUS; SUBCUTANEOUS at 18:10

## 2020-08-12 RX ADMIN — Medication 500 MILLIGRAM(S): at 13:15

## 2020-08-12 RX ADMIN — Medication 975 MILLIGRAM(S): at 07:50

## 2020-08-12 NOTE — PROGRESS NOTE ADULT - ASSESSMENT
A/P: 28yo s/p c/s @35w 2/2 PPROM readmitted for sPEC, now on Mg and s/p hydralazine 10mg IVP in triage. BP control much improved and patient asymptomatic. SBP 160s x1 at 630pm.    - AM HELLP labs  - c/w Mgx24h  - c/w procardia 30XL  - tylenol prn  - encourage ambulation  - reg diet  - monitor BPs    H Gurrola PGY3

## 2020-08-12 NOTE — PROGRESS NOTE ADULT - SUBJECTIVE AND OBJECTIVE BOX
R3 Antepartum Note, HD#2    Patient seen and examined at bedside, no acute overnight events. Denies HA, epigastric pain, blurred vision, CP, SOB, N/V, fevers, and chills.    Vital Signs Last 24 Hours  T(C): 37.1 (08-12-20 @ 01:05), Max: 37.5 (08-11-20 @ 17:34)  HR: 84 (08-12-20 @ 03:05) (62 - 95)  BP: 140/74 (08-12-20 @ 03:05) (106/55 - 160/82)  RR: 12 (08-12-20 @ 03:05) (12 - 13)  SpO2: 98% (08-12-20 @ 03:05) (97% - 100%)          Physical Exam:  General: NAD  Abdomen: Soft, non-tender, gravid  Ext: No pain or swelling      Labs:             10.2   8.28  )-----------( 563      ( 08-11 @ 16:20 )             34.1     08-11 @ 16:20    139  |  102  |  11  ----------------------------<  81  4.0   |  25  |  0.72    Ca    9.8      08-11 @ 16:20  Mg     4.3     08-11 @ 22:30    TPro  7.8  /  Alb  3.9  /  TBili  0.3  /  DBili  x   /  AST  16  /  ALT  11  /  AlkPhos  77  08-11 @ 16:20    PT/INR - ( 08-11 @ 16:20 )   PT: 13.0 SEC;   INR: 1.15     PTT - ( 08-11 @ 16:20 )  PTT:30.9 SEC    Uric Acid: (08-11 @ 16:20)  7.9      Fibrinogen: (08-11 @ 16:20)  723.0    LDH: (08-11 @ 16:20)  254        MEDICATIONS  (STANDING):  acetaminophen   Tablet .. 975 milliGRAM(s) Oral every 6 hours  ascorbic acid 500 milliGRAM(s) Oral daily  ferrous    sulfate 325 milliGRAM(s) Oral daily  lactated ringers. 1000 milliLiter(s) (50 mL/Hr) IV Continuous <Continuous>  magnesium sulfate Infusion 2 Gm/Hr (50 mL/Hr) IV Continuous <Continuous>  NIFEdipine XL 30 milliGRAM(s) Oral daily    MEDICATIONS  (PRN):  magnesium hydroxide Suspension 30 milliLiter(s) Oral daily PRN Constipation

## 2020-08-13 ENCOUNTER — TRANSCRIPTION ENCOUNTER (OUTPATIENT)
Age: 30
End: 2020-08-13

## 2020-08-13 VITALS
OXYGEN SATURATION: 98 % | SYSTOLIC BLOOD PRESSURE: 129 MMHG | HEART RATE: 101 BPM | TEMPERATURE: 98 F | DIASTOLIC BLOOD PRESSURE: 72 MMHG

## 2020-08-13 PROCEDURE — 99238 HOSP IP/OBS DSCHRG MGMT 30/<: CPT | Mod: GC

## 2020-08-13 RX ORDER — ACETAMINOPHEN 500 MG
3 TABLET ORAL
Qty: 0 | Refills: 0 | DISCHARGE
Start: 2020-08-13

## 2020-08-13 RX ORDER — NIFEDIPINE 30 MG
1 TABLET, EXTENDED RELEASE 24 HR ORAL
Qty: 30 | Refills: 0
Start: 2020-08-13 | End: 2020-09-11

## 2020-08-13 RX ORDER — ASCORBIC ACID 60 MG
1 TABLET,CHEWABLE ORAL
Qty: 0 | Refills: 0 | DISCHARGE
Start: 2020-08-13

## 2020-08-13 RX ADMIN — Medication 975 MILLIGRAM(S): at 02:16

## 2020-08-13 RX ADMIN — HEPARIN SODIUM 5000 UNIT(S): 5000 INJECTION INTRAVENOUS; SUBCUTANEOUS at 06:00

## 2020-08-13 RX ADMIN — Medication 975 MILLIGRAM(S): at 01:08

## 2020-08-13 RX ADMIN — SODIUM CHLORIDE 3 MILLILITER(S): 9 INJECTION INTRAMUSCULAR; INTRAVENOUS; SUBCUTANEOUS at 05:52

## 2020-08-13 NOTE — DISCHARGE NOTE NURSING/CASE MANAGEMENT/SOCIAL WORK - PATIENT PORTAL LINK FT
You can access the FollowMyHealth Patient Portal offered by [facility] by registering at the following website: [email or web address]. By joining [employer]’s FollowMyHealth portal, you will also be able to view your health information using other applications (apps) compatible with our system.

## 2020-08-13 NOTE — PROGRESS NOTE ADULT - SUBJECTIVE AND OBJECTIVE BOX
R3 Antepartum Note, HD#3    Patient seen and examined at bedside, no acute overnight events. Denies HA, epigastric pain, blurred vision, CP, SOB, N/V, fevers, and chills.    Vital Signs Last 24 Hours  T(C): 36.8 (08-13-20 @ 00:10), Max: 36.9 (08-12-20 @ 13:10)  HR: 72 (08-13-20 @ 04:10) (68 - 96)  BP: 119/60 (08-13-20 @ 04:10) (103/56 - 147/78)  RR: 16 (08-12-20 @ 19:09) (12 - 16)  SpO2: 100% (08-12-20 @ 19:09) (94% - 100%)      Physical Exam:  General: NAD  Abdomen: Soft, non-tender, gravid  Ext: No pain or swelling    Labs:             9.5    8.57  )-----------( 552      ( 08-12 @ 05:10 )             31.3     08-12 @ 05:10    139  |  102  |  10  ----------------------------<  98  3.6   |  22  |  0.76    Ca    8.2      08-12 @ 05:10  Mg     5.5     08-12 @ 10:45    TPro  6.6  /  Alb  3.3  /  TBili  < 0.2  /  DBili  x   /  AST  15  /  ALT  11  /  AlkPhos  68  08-12 @ 05:10    PT/INR - ( 08-12 @ 05:10 )   PT: 11.6 SEC;   INR: 1.01     PTT - ( 08-12 @ 05:10 )  PTT:29.0 SEC    Uric Acid: (08-12 @ 05:10)  7.9      Fibrinogen: (08-12 @ 05:10)  630.0    LDH: (08-12 @ 05:10)  214        MEDICATIONS  (STANDING):  acetaminophen   Tablet .. 975 milliGRAM(s) Oral every 6 hours  ascorbic acid 500 milliGRAM(s) Oral daily  ferrous    sulfate 325 milliGRAM(s) Oral daily  heparin   Injectable 5000 Unit(s) SubCutaneous every 12 hours  NIFEdipine XL 30 milliGRAM(s) Oral daily  sodium chloride 0.9% lock flush 3 milliLiter(s) IV Push every 8 hours    MEDICATIONS  (PRN):  magnesium hydroxide Suspension 30 milliLiter(s) Oral daily PRN Constipation

## 2020-08-13 NOTE — PROGRESS NOTE ADULT - ATTENDING COMMENTS
Pt seen and examined and agree with above assessment and plan.  for discharge home with f/u in the office w/Dr. Alonso this week.

## 2020-08-13 NOTE — DISCHARGE NOTE PROVIDER - NSDCCPCAREPLAN_GEN_ALL_CORE_FT
PRINCIPAL DISCHARGE DIAGNOSIS  Diagnosis: Pre-eclampsia, postpartum  Assessment and Plan of Treatment: - Continue BP meds as prescribed (hold is BP is under 110/60 )  -Take blood pressure with at home cuff prior to taking medications; if BP is >150/90 call MD  - Return to hospital with headaches, visual changes, abdominal pain, nausea, vomiting, chest pain or shortness of breathe  - Follow up with OB in 2 days for BP check  - Follow up with [facility] (call [phone])

## 2020-08-13 NOTE — PROGRESS NOTE ADULT - ASSESSMENT
A/P: 30yo s/p c/s @35w 2/2 PPROM readmitted for sPEC s/p Mg and s/p hydralazine 10mg IVP in triage. BP control much improved (120s-140s/60s-80s) and no severe range BPs. Patient asymptomatic.     - s/p Mgx24h  - c/w procardia 30XL  - tylenol prn  - encourage ambulation  - reg diet  - monitor BPs  - discharge planning    CASEY Gurrola PGY3

## 2020-08-13 NOTE — DISCHARGE NOTE PROVIDER - NSDCMRMEDTOKEN_GEN_ALL_CORE_FT
acetaminophen 325 mg oral tablet: 3 tab(s) orally every 6 hours  ascorbic acid 500 mg oral tablet: 1 tab(s) orally once a day  ferrous sulfate 325 mg (65 mg elemental iron) oral delayed release tablet: 1 tab(s) orally once a day  ibuprofen 600 mg oral tablet: 1 tab(s) orally every 6 hours  NIFEdipine 30 mg oral tablet, extended release: 1 tab(s) orally once a day  hold if BP&lt;110/60

## 2020-08-13 NOTE — DISCHARGE NOTE PROVIDER - CARE PROVIDER_API CALL
Jairon Alonso  OBSTETRICS AND GYNECOLOGY  [address]  [place] [address]  Phone: [phone]  Fax: [phone]  Follow Up Time:

## 2020-08-13 NOTE — DISCHARGE NOTE NURSING/CASE MANAGEMENT/SOCIAL WORK - NSDCFUADDAPPT_GEN_ALL_CORE_FT
- Continue BP meds as prescribed (hold is BP is under 110/60 )  -Take blood pressure with at home cuff prior to taking medications; if BP is >150/90 call MD  - Return to hospital with headaches, visual changes, abdominal pain, nausea, vomiting, chest pain or shortness of breathe  - Follow up with OB in 2 days for BP check  - Follow up with [facility] (call [phone])

## 2020-08-13 NOTE — DISCHARGE NOTE PROVIDER - HOSPITAL COURSE
30yo s/p c/s at 35w d/t PPROM readmitted for sPEC s/p Mg for 24 hours and s/p hydralazine 10mg IVP in triage. Pt started on Procardia 30XL on asmission. BP control much improved (120s-140s/60s-80s) on procardia 30xl. Pt denies headaches, visual changes, RUQ pain, N/V. HELLP labs WNL. Pt is stable for discharge with home BP monitoring and close outpatient follow up in 2 days with ob.

## 2020-08-13 NOTE — DISCHARGE NOTE PROVIDER - NSDCFUADDAPPT_GEN_ALL_CORE_FT
- Continue BP meds as prescribed (hold is BP is under 110/60 )  -Take blood pressure with at home cuff prior to taking medications; if BP is >150/90 call MD  - Return to hospital with headaches, visual changes, abdominal pain, nausea, vomiting, chest pain or shortness of breathe  - Follow up with OB in 2 days for BP check  - Follow up with [facility] (call [phone])

## 2020-08-17 ENCOUNTER — APPOINTMENT (OUTPATIENT)
Dept: OBGYN | Facility: CLINIC | Age: 30
End: 2020-08-17
Payer: COMMERCIAL

## 2020-08-17 VITALS
WEIGHT: 221 LBS | DIASTOLIC BLOOD PRESSURE: 86 MMHG | HEIGHT: 62 IN | BODY MASS INDEX: 40.67 KG/M2 | SYSTOLIC BLOOD PRESSURE: 124 MMHG

## 2020-08-17 DIAGNOSIS — O34.219 MATERNAL CARE FOR UNSPECIFIED TYPE SCAR FROM PREVIOUS CESAREAN DELIVERY: ICD-10-CM

## 2020-08-17 DIAGNOSIS — D25.9 MATERNAL CARE FOR BENIGN TUMOR OF CORPUS UTERI, UNSPECIFIED TRIMESTER: ICD-10-CM

## 2020-08-17 DIAGNOSIS — O13.9 GESTATIONAL [PREGNANCY-INDUCED] HYPERTENSION W/OUT SIGNIFICANT PROTEINURIA, UNSPECIFIED TRIMESTER: ICD-10-CM

## 2020-08-17 DIAGNOSIS — O99.011 ANEMIA COMPLICATING PREGNANCY, FIRST TRIMESTER: ICD-10-CM

## 2020-08-17 DIAGNOSIS — O34.10 MATERNAL CARE FOR BENIGN TUMOR OF CORPUS UTERI, UNSPECIFIED TRIMESTER: ICD-10-CM

## 2020-08-17 PROCEDURE — 0503F POSTPARTUM CARE VISIT: CPT

## 2020-08-17 RX ORDER — FERROUS SULFATE 325(65) MG
325 (65 FE) TABLET ORAL TWICE DAILY
Qty: 60 | Refills: 2 | Status: DISCONTINUED | COMMUNITY
Start: 2020-06-02 | End: 2020-08-17

## 2020-08-17 RX ORDER — METOCLOPRAMIDE 10 MG/1
10 TABLET ORAL EVERY 6 HOURS
Qty: 120 | Refills: 0 | Status: DISCONTINUED | COMMUNITY
Start: 2020-02-03 | End: 2020-08-17

## 2020-08-17 RX ORDER — ASPIRIN ENTERIC COATED TABLETS 81 MG 81 MG/1
81 TABLET, DELAYED RELEASE ORAL DAILY
Qty: 60 | Refills: 11 | Status: DISCONTINUED | COMMUNITY
Start: 2020-01-06 | End: 2020-08-17

## 2020-08-17 NOTE — HISTORY OF PRESENT ILLNESS
[Postpartum Follow Up] : postpartum follow up [Complications:___] : no complications [Breastfeeding] : not currently nursing [S/Sx PP Depression] : no signs/symptoms of postpartum depression [Erythema] : not erythematous [Healed] : healed [None] : no vaginal bleeding [Back to Normal] : is back to normal in size [Normal] : the vagina was normal [Cervix Sample Taken] : cervical sample not taken for a Pap smear [Not Done] : Examination of breasts not done [Doing Well] : is doing well [No Sign of Infection] : is showing no signs of infection [Excellent Pain Control] : has excellent pain control [Limited ADLs] : to participate in activities of daily living with limitations [No Talty] : to avoid sexual intercourse [Limited Housework] : to do housework with limitations [Limited Work] : to work with limitations [de-identified] : denies symptoms of preeclampsia [Limited Sports___] : to participate in sports with limitations~U: [unfilled] [de-identified] : BP controlled, continue nifedipine

## 2020-08-26 ENCOUNTER — APPOINTMENT (OUTPATIENT)
Dept: OBGYN | Facility: CLINIC | Age: 30
End: 2020-08-26

## 2020-08-31 ENCOUNTER — APPOINTMENT (OUTPATIENT)
Dept: OBGYN | Facility: CLINIC | Age: 30
End: 2020-08-31

## 2020-09-02 ENCOUNTER — RX RENEWAL (OUTPATIENT)
Age: 30
End: 2020-09-02

## 2020-09-04 ENCOUNTER — APPOINTMENT (OUTPATIENT)
Dept: OBGYN | Facility: CLINIC | Age: 30
End: 2020-09-04
Payer: COMMERCIAL

## 2020-09-04 VITALS
DIASTOLIC BLOOD PRESSURE: 90 MMHG | WEIGHT: 222 LBS | BODY MASS INDEX: 39.34 KG/M2 | HEIGHT: 63 IN | SYSTOLIC BLOOD PRESSURE: 124 MMHG

## 2020-09-04 PROCEDURE — 0503F POSTPARTUM CARE VISIT: CPT

## 2020-09-14 ENCOUNTER — APPOINTMENT (OUTPATIENT)
Dept: OBGYN | Facility: CLINIC | Age: 30
End: 2020-09-14

## 2020-09-14 RX ORDER — NIFEDIPINE 30 MG/1
30 TABLET, EXTENDED RELEASE ORAL DAILY
Qty: 30 | Refills: 0 | Status: DISCONTINUED | COMMUNITY
Start: 2020-08-12 | End: 2020-09-14

## 2020-09-14 NOTE — HISTORY OF PRESENT ILLNESS
[Primary C/S] : delivered by  section [Postpartum Follow Up] : postpartum follow up [Complications:___] : complications include: [unfilled] [Breastfeeding] : not currently nursing [S/Sx PP Depression] : no signs/symptoms of postpartum depression [Erythema] : not erythematous [Healed] : healed [Back to Normal] : is back to normal in size [Not Done] : Examination of breasts not done [Cervix Sample Taken] : cervical sample not taken for a Pap smear [Normal] : the vagina was normal [No Sign of Infection] : is showing no signs of infection [Excellent Pain Control] : has excellent pain control [Doing Well] : is doing well [None] : None [de-identified] : stop all meds

## 2020-09-22 ENCOUNTER — APPOINTMENT (OUTPATIENT)
Dept: OBGYN | Facility: CLINIC | Age: 30
End: 2020-09-22

## 2020-09-29 ENCOUNTER — APPOINTMENT (OUTPATIENT)
Dept: OBGYN | Facility: CLINIC | Age: 30
End: 2020-09-29
Payer: COMMERCIAL

## 2020-09-29 VITALS
DIASTOLIC BLOOD PRESSURE: 72 MMHG | HEIGHT: 63 IN | WEIGHT: 228 LBS | BODY MASS INDEX: 40.4 KG/M2 | SYSTOLIC BLOOD PRESSURE: 130 MMHG

## 2020-09-29 DIAGNOSIS — Z30.430 ENCOUNTER FOR INSERTION OF INTRAUTERINE CONTRACEPTIVE DEVICE: ICD-10-CM

## 2020-09-29 LAB
HCG UR QL: NEGATIVE
QUALITY CONTROL: YES

## 2020-09-29 PROCEDURE — 81025 URINE PREGNANCY TEST: CPT

## 2020-09-29 PROCEDURE — 58300 INSERT INTRAUTERINE DEVICE: CPT

## 2020-09-29 NOTE — PROCEDURE
[IUD Placement] : intrauterine device (IUD) placement [Time out performed] : Pre-procedure time out performed.  Patient's name, date of birth and procedure confirmed. [Consent Obtained] : Consent obtained [Prevention of Pregnancy] : prevention of pregnancy [Risks] : risks [Benefits] : benefits [Alternatives] : alternatives [Patient] : patient [Infection] : infection [Bleeding] : bleeding [Pain] : pain [Expulsion] : expulsion [Failure] : failure [Uterine Perforation] : uterine perforation [Neg Pregnancy Test] : negative pregnancy test [No Premedication] : No premedication [Betadine] : Betadine [Tenaculum] : Tenaculum [Easy Passage] : Easy passage [Sounded to ___ cm] : sounded to [unfilled] ~Ucm [Post Placement Transvag. US] : post placement transvaginal ultrasound [Mirena IUD] : Mirena IUD [Tolerated Well] : Patient tolerated the procedure well [No Complications] : No complications

## 2021-01-12 ENCOUNTER — APPOINTMENT (OUTPATIENT)
Dept: OBGYN | Facility: CLINIC | Age: 31
End: 2021-01-12
Payer: COMMERCIAL

## 2021-01-12 VITALS
BODY MASS INDEX: 44.16 KG/M2 | WEIGHT: 240 LBS | HEIGHT: 62 IN | TEMPERATURE: 97.2 F | SYSTOLIC BLOOD PRESSURE: 120 MMHG | DIASTOLIC BLOOD PRESSURE: 78 MMHG

## 2021-01-12 DIAGNOSIS — D25.9 LEIOMYOMA OF UTERUS, UNSPECIFIED: ICD-10-CM

## 2021-01-12 PROCEDURE — 76830 TRANSVAGINAL US NON-OB: CPT

## 2021-01-12 PROCEDURE — 99072 ADDL SUPL MATRL&STAF TM PHE: CPT

## 2021-01-12 RX ORDER — LEVONORGESTREL 52 MG/1
20 INTRAUTERINE DEVICE INTRAUTERINE
Refills: 0 | Status: ACTIVE | COMMUNITY
Start: 2021-01-12

## 2021-01-13 LAB
C TRACH RRNA SPEC QL NAA+PROBE: NOT DETECTED
N GONORRHOEA RRNA SPEC QL NAA+PROBE: NOT DETECTED
SOURCE AMPLIFICATION: NORMAL
SOURCE TP AMPLIFICATION: NORMAL
T VAGINALIS RRNA SPEC QL NAA+PROBE: NOT DETECTED

## 2021-03-16 NOTE — ED ADULT TRIAGE NOTE - STATUS:
Intact Spironolactone Counseling: Patient advised regarding risks of diarrhea, abdominal pain, hyperkalemia, birth defects (for female patients), liver toxicity and renal toxicity. The patient may need blood work to monitor liver and kidney function and potassium levels while on therapy. The patient verbalized understanding of the proper use and possible adverse effects of spironolactone.  All of the patient's questions and concerns were addressed.

## 2021-04-06 ENCOUNTER — APPOINTMENT (OUTPATIENT)
Dept: OBGYN | Facility: CLINIC | Age: 31
End: 2021-04-06
Payer: COMMERCIAL

## 2021-04-06 VITALS
WEIGHT: 255 LBS | SYSTOLIC BLOOD PRESSURE: 126 MMHG | HEIGHT: 62 IN | DIASTOLIC BLOOD PRESSURE: 82 MMHG | BODY MASS INDEX: 46.93 KG/M2

## 2021-04-06 PROCEDURE — 99395 PREV VISIT EST AGE 18-39: CPT

## 2021-04-06 PROCEDURE — 99072 ADDL SUPL MATRL&STAF TM PHE: CPT

## 2021-04-07 LAB
C TRACH RRNA SPEC QL NAA+PROBE: NOT DETECTED
CANDIDA VAG CYTO: NOT DETECTED
G VAGINALIS+PREV SP MTYP VAG QL MICRO: DETECTED
HPV HIGH+LOW RISK DNA PNL CVX: NOT DETECTED
N GONORRHOEA RRNA SPEC QL NAA+PROBE: NOT DETECTED
SOURCE AMPLIFICATION: NORMAL
SOURCE TP AMPLIFICATION: NORMAL
T VAGINALIS RRNA SPEC QL NAA+PROBE: NOT DETECTED
T VAGINALIS VAG QL WET PREP: NOT DETECTED

## 2021-04-07 NOTE — HISTORY OF PRESENT ILLNESS
[N] : Patient reports normal menses [IUD] : has an intrauterine device [Y] : Patient is sexually active [Monogamous (Male Partner)] : is monogamous with a male partner [Patient refuses STI testing] : Patient refuses STI testing [FreeTextEntry1] : 29 yo presents for annual exam and pap smear.  No complaints at this time. [PapSmeardate] : u [LMPDate] : 3/18/21 [de-identified] : Mirena

## 2021-04-07 NOTE — DISCUSSION/SUMMARY
[FreeTextEntry1] : Avoid douching, sugary foods, constrictive clothing, perfumed feminine products\par Keep area clean and dry\par Change pads and tampons every 4-8 hours\par Use mild unscented soap or plain water to clean vagina at least once daily\par Wear cotton underwear\par Wipe carefully after bowel movements\par

## 2021-04-07 NOTE — COUNSELING
[Nutrition/ Exercise/ Weight Management] : nutrition, exercise, weight management [Body Image] : body image [Breast Self Exam] : breast self exam [STD (testing, results, tx)] : STD (testing, results, tx)

## 2021-04-07 NOTE — PHYSICAL EXAM
[Appropriately responsive] : appropriately responsive [Alert] : alert [No Acute Distress] : no acute distress [Oriented x3] : oriented x3 [Examination Of The Breasts] : a normal appearance [No Masses] : no breast masses were palpable [Labia Majora] : normal [Labia Minora] : normal [Discharge] : a  ~M vaginal discharge was present [Moderate] : moderate [Foul Smelling] : foul smelling [White] : white [Thick] : thick [Normal] : normal [Uterine Adnexae] : normal

## 2021-04-07 NOTE — REVIEW OF SYSTEMS
[SOB on Exertion] : shortness of breath on exertion [Chest Pain] : chest pain [Headache] : headache [Negative] : Heme/Lymph [FreeTextEntry8] : Vaginal discharge

## 2021-04-09 LAB — CYTOLOGY CVX/VAG DOC THIN PREP: ABNORMAL

## 2021-04-12 ENCOUNTER — EMERGENCY (EMERGENCY)
Facility: HOSPITAL | Age: 31
LOS: 1 days | Discharge: HOME | End: 2021-04-12
Attending: EMERGENCY MEDICINE | Admitting: EMERGENCY MEDICINE
Payer: COMMERCIAL

## 2021-04-12 VITALS
SYSTOLIC BLOOD PRESSURE: 129 MMHG | TEMPERATURE: 98 F | OXYGEN SATURATION: 100 % | HEART RATE: 74 BPM | DIASTOLIC BLOOD PRESSURE: 80 MMHG | RESPIRATION RATE: 18 BRPM

## 2021-04-12 VITALS
DIASTOLIC BLOOD PRESSURE: 107 MMHG | OXYGEN SATURATION: 100 % | HEIGHT: 62 IN | TEMPERATURE: 99 F | SYSTOLIC BLOOD PRESSURE: 160 MMHG | RESPIRATION RATE: 18 BRPM | HEART RATE: 102 BPM

## 2021-04-12 DIAGNOSIS — Z98.891 HISTORY OF UTERINE SCAR FROM PREVIOUS SURGERY: Chronic | ICD-10-CM

## 2021-04-12 DIAGNOSIS — Z98.890 OTHER SPECIFIED POSTPROCEDURAL STATES: Chronic | ICD-10-CM

## 2021-04-12 LAB
ALBUMIN SERPL ELPH-MCNC: 4.5 G/DL — SIGNIFICANT CHANGE UP (ref 3.3–5)
ALP SERPL-CCNC: 61 U/L — SIGNIFICANT CHANGE UP (ref 40–120)
ALT FLD-CCNC: 13 U/L — SIGNIFICANT CHANGE UP (ref 4–33)
ANION GAP SERPL CALC-SCNC: 11 MMOL/L — SIGNIFICANT CHANGE UP (ref 7–14)
AST SERPL-CCNC: 14 U/L — SIGNIFICANT CHANGE UP (ref 4–32)
BASOPHILS # BLD AUTO: 0.04 K/UL — SIGNIFICANT CHANGE UP (ref 0–0.2)
BASOPHILS NFR BLD AUTO: 0.4 % — SIGNIFICANT CHANGE UP (ref 0–2)
BILIRUB SERPL-MCNC: <0.2 MG/DL — SIGNIFICANT CHANGE UP (ref 0.2–1.2)
BUN SERPL-MCNC: 10 MG/DL — SIGNIFICANT CHANGE UP (ref 7–23)
CALCIUM SERPL-MCNC: 9.5 MG/DL — SIGNIFICANT CHANGE UP (ref 8.4–10.5)
CHLORIDE SERPL-SCNC: 102 MMOL/L — SIGNIFICANT CHANGE UP (ref 98–107)
CO2 SERPL-SCNC: 25 MMOL/L — SIGNIFICANT CHANGE UP (ref 22–31)
CREAT SERPL-MCNC: 0.6 MG/DL — SIGNIFICANT CHANGE UP (ref 0.5–1.3)
EOSINOPHIL # BLD AUTO: 0.08 K/UL — SIGNIFICANT CHANGE UP (ref 0–0.5)
EOSINOPHIL NFR BLD AUTO: 0.8 % — SIGNIFICANT CHANGE UP (ref 0–6)
GLUCOSE SERPL-MCNC: 80 MG/DL — SIGNIFICANT CHANGE UP (ref 70–99)
HCT VFR BLD CALC: 36 % — SIGNIFICANT CHANGE UP (ref 34.5–45)
HGB BLD-MCNC: 11.1 G/DL — LOW (ref 11.5–15.5)
IANC: 5.27 K/UL — SIGNIFICANT CHANGE UP (ref 1.5–8.5)
IMM GRANULOCYTES NFR BLD AUTO: 0.3 % — SIGNIFICANT CHANGE UP (ref 0–1.5)
LYMPHOCYTES # BLD AUTO: 3.54 K/UL — HIGH (ref 1–3.3)
LYMPHOCYTES # BLD AUTO: 36.8 % — SIGNIFICANT CHANGE UP (ref 13–44)
MCHC RBC-ENTMCNC: 24.3 PG — LOW (ref 27–34)
MCHC RBC-ENTMCNC: 30.8 GM/DL — LOW (ref 32–36)
MCV RBC AUTO: 78.9 FL — LOW (ref 80–100)
MONOCYTES # BLD AUTO: 0.66 K/UL — SIGNIFICANT CHANGE UP (ref 0–0.9)
MONOCYTES NFR BLD AUTO: 6.9 % — SIGNIFICANT CHANGE UP (ref 2–14)
NEUTROPHILS # BLD AUTO: 5.27 K/UL — SIGNIFICANT CHANGE UP (ref 1.8–7.4)
NEUTROPHILS NFR BLD AUTO: 54.8 % — SIGNIFICANT CHANGE UP (ref 43–77)
NRBC # BLD: 0 /100 WBCS — SIGNIFICANT CHANGE UP
NRBC # FLD: 0 K/UL — SIGNIFICANT CHANGE UP
PLATELET # BLD AUTO: 442 K/UL — HIGH (ref 150–400)
POTASSIUM SERPL-MCNC: 4.1 MMOL/L — SIGNIFICANT CHANGE UP (ref 3.5–5.3)
POTASSIUM SERPL-SCNC: 4.1 MMOL/L — SIGNIFICANT CHANGE UP (ref 3.5–5.3)
PROT SERPL-MCNC: 7.6 G/DL — SIGNIFICANT CHANGE UP (ref 6–8.3)
RBC # BLD: 4.56 M/UL — SIGNIFICANT CHANGE UP (ref 3.8–5.2)
RBC # FLD: 15.4 % — HIGH (ref 10.3–14.5)
SODIUM SERPL-SCNC: 138 MMOL/L — SIGNIFICANT CHANGE UP (ref 135–145)
WBC # BLD: 9.62 K/UL — SIGNIFICANT CHANGE UP (ref 3.8–10.5)
WBC # FLD AUTO: 9.62 K/UL — SIGNIFICANT CHANGE UP (ref 3.8–10.5)

## 2021-04-12 PROCEDURE — 70450 CT HEAD/BRAIN W/O DYE: CPT | Mod: 26

## 2021-04-12 PROCEDURE — 99285 EMERGENCY DEPT VISIT HI MDM: CPT

## 2021-04-12 RX ORDER — SODIUM CHLORIDE 9 MG/ML
1000 INJECTION INTRAMUSCULAR; INTRAVENOUS; SUBCUTANEOUS ONCE
Refills: 0 | Status: COMPLETED | OUTPATIENT
Start: 2021-04-12 | End: 2021-04-12

## 2021-04-12 RX ORDER — METOCLOPRAMIDE HCL 10 MG
10 TABLET ORAL ONCE
Refills: 0 | Status: COMPLETED | OUTPATIENT
Start: 2021-04-12 | End: 2021-04-12

## 2021-04-12 RX ORDER — ACETAMINOPHEN 500 MG
975 TABLET ORAL ONCE
Refills: 0 | Status: COMPLETED | OUTPATIENT
Start: 2021-04-12 | End: 2021-04-12

## 2021-04-12 RX ORDER — KETOROLAC TROMETHAMINE 30 MG/ML
15 SYRINGE (ML) INJECTION ONCE
Refills: 0 | Status: DISCONTINUED | OUTPATIENT
Start: 2021-04-12 | End: 2021-04-12

## 2021-04-12 RX ADMIN — Medication 15 MILLIGRAM(S): at 17:30

## 2021-04-12 RX ADMIN — Medication 10 MILLIGRAM(S): at 17:30

## 2021-04-12 RX ADMIN — Medication 975 MILLIGRAM(S): at 17:30

## 2021-04-12 RX ADMIN — SODIUM CHLORIDE 1000 MILLILITER(S): 9 INJECTION INTRAMUSCULAR; INTRAVENOUS; SUBCUTANEOUS at 17:30

## 2021-04-12 NOTE — ED PROVIDER NOTE - OBJECTIVE STATEMENT
31 y/o F presents to ED with headache x4 days. The headache is located on the left posterior aspect of the head. Pt states the headache is gradual in onset, waxes and wanes, and is non-radiating. Reports she has never had a headache like this before. Earlier today headache was associated with light-headedness. Denies associated fever, chills, neck pain, chest pain, SOB, weakness, numbness, vision changes , imbalance, speech difficulty, edema, or hormone use. Pt has tried Ibuprofen for the pain without relief. NKDA.

## 2021-04-12 NOTE — ED ADULT TRIAGE NOTE - CHIEF COMPLAINT QUOTE
Pt c/o sharp pain to left side of head started on Friday. Denies dizziness/n/v/change in vision. Denies head injury/falls. Pt also c/o lump to the left side of neck. Denies PMH.

## 2021-04-12 NOTE — ED PROVIDER NOTE - PROGRESS NOTE DETAILS
SANCHEZ:  Patient signed out to me by Dr. Levi pending head ct and reassessment.  Head ct negative for acute pathology.  Patient reassessed and headache improved.  Feels well enough to go home.  Return precautions given.  Patient to be given neurology clinic information for follow up.

## 2021-04-12 NOTE — ED ADULT NURSE NOTE - OBJECTIVE STATEMENT
received to intake room 2. complains of posterior headache since friday. reports waxes and wanes and is associated with lightheadedness. denies any fever/chills, cp, sob, weakness, numbness, or neuro deficits. labs sent. 20 g iv placed in right ac. medicated as ordered. awaiting ct in nad.

## 2021-04-12 NOTE — ED PROVIDER NOTE - CHPI ED SYMPTOMS NEG
hills, neck pain, chest pain, SOB,vision changes , imbalance, speech difficulty, edema/no fever/no numbness/no weakness

## 2021-04-12 NOTE — ED PROVIDER NOTE - CLINICAL SUMMARY MEDICAL DECISION MAKING FREE TEXT BOX
29 y/o F presents with left posterior waxing and waning gradual onset headache with transient light-headedness now resolved. Neuro exam without any deficits. Normal CP exam. Plan for CT head, labs, and pain control. Evaluate for anemia, electrolyte disturbance, and brain mass. Suspect more likely migraine vs tension.

## 2021-04-12 NOTE — ED PROVIDER NOTE - NSFOLLOWUPINSTRUCTIONS_ED_ALL_ED_FT
1.  Return to care for fever, neck pain, eye pain, nausea, vomiting, worsening headache, numbness, weakness, tingling, difficulty walking, difficulty talking.   2.  Take tylenol 500 mg every 8 hours as needed for pain.   3.  Follow up with your primary care doctor or neurology as early as able.

## 2021-04-12 NOTE — ED PROVIDER NOTE - PATIENT PORTAL LINK FT
You can access the FollowMyHealth Patient Portal offered by [facility] by registering at the following website: [email or web address]. By joining [facility]’s FollowMyHealth portal, you will also be able to view your health information using other applications (apps) compatible with our system.

## 2021-07-07 NOTE — ED CDU PROVIDER INITIAL DAY NOTE - NSTIMEPROVIDERCAREINITIATE_GEN_ER
PAST MEDICAL HISTORY:  Cellulitis of left lower extremity     CHF (congestive heart failure)     Chronic atrial fibrillation     COPD (chronic obstructive pulmonary disease)     Deep vein thrombosis (DVT) of left lower extremity, unspecified chronicity, unspecified vein     Hypertension     Mitral valve insufficiency, unspecified etiology Moderate    
26-Feb-2020 15:39

## 2021-10-25 ENCOUNTER — NON-APPOINTMENT (OUTPATIENT)
Age: 31
End: 2021-10-25

## 2021-10-25 ENCOUNTER — APPOINTMENT (OUTPATIENT)
Dept: INTERNAL MEDICINE | Facility: CLINIC | Age: 31
End: 2021-10-25
Payer: COMMERCIAL

## 2021-10-25 VITALS
RESPIRATION RATE: 16 BRPM | SYSTOLIC BLOOD PRESSURE: 146 MMHG | HEIGHT: 62 IN | DIASTOLIC BLOOD PRESSURE: 92 MMHG | BODY MASS INDEX: 50.42 KG/M2 | TEMPERATURE: 98 F | OXYGEN SATURATION: 95 % | WEIGHT: 274 LBS | HEART RATE: 88 BPM

## 2021-10-25 DIAGNOSIS — N76.0 ACUTE VAGINITIS: ICD-10-CM

## 2021-10-25 DIAGNOSIS — Z01.419 ENCOUNTER FOR GYNECOLOGICAL EXAMINATION (GENERAL) (ROUTINE) W/OUT ABNORMAL FINDINGS: ICD-10-CM

## 2021-10-25 DIAGNOSIS — Z86.39 PERSONAL HISTORY OF OTHER ENDOCRINE, NUTRITIONAL AND METABOLIC DISEASE: ICD-10-CM

## 2021-10-25 DIAGNOSIS — Z3A.01 LESS THAN 8 WEEKS GESTATION OF PREGNANCY: ICD-10-CM

## 2021-10-25 DIAGNOSIS — O99.810 ABNORMAL GLUCOSE COMPLICATING PREGNANCY: ICD-10-CM

## 2021-10-25 DIAGNOSIS — Z87.59 PERSONAL HISTORY OF OTHER COMPLICATIONS OF PREGNANCY, CHILDBIRTH AND THE PUERPERIUM: ICD-10-CM

## 2021-10-25 DIAGNOSIS — Z3A.10 10 WEEKS GESTATION OF PREGNANCY: ICD-10-CM

## 2021-10-25 DIAGNOSIS — Z30.431 ENCOUNTER FOR ROUTINE CHECKING OF INTRAUTERINE CONTRACEPTIVE DEVICE: ICD-10-CM

## 2021-10-25 DIAGNOSIS — Z36.89 ENCOUNTER FOR OTHER SPECIFIED ANTENATAL SCREENING: ICD-10-CM

## 2021-10-25 DIAGNOSIS — Z3A.15 15 WEEKS GESTATION OF PREGNANCY: ICD-10-CM

## 2021-10-25 DIAGNOSIS — Z3A.18 18 WEEKS GESTATION OF PREGNANCY: ICD-10-CM

## 2021-10-25 DIAGNOSIS — Z3A.22 22 WEEKS GESTATION OF PREGNANCY: ICD-10-CM

## 2021-10-25 DIAGNOSIS — N89.8 OTHER SPECIFIED NONINFLAMMATORY DISORDERS OF VAGINA: ICD-10-CM

## 2021-10-25 DIAGNOSIS — Z3A.31 31 WEEKS GESTATION OF PREGNANCY: ICD-10-CM

## 2021-10-25 DIAGNOSIS — O36.80X0 PREGNANCY WITH INCONCLUSIVE FETAL VIABILITY, NOT APPLICABLE OR UNSPECIFIED: ICD-10-CM

## 2021-10-25 DIAGNOSIS — O21.9 VOMITING OF PREGNANCY, UNSPECIFIED: ICD-10-CM

## 2021-10-25 DIAGNOSIS — Z3A.33 33 WEEKS GESTATION OF PREGNANCY: ICD-10-CM

## 2021-10-25 DIAGNOSIS — Z92.89 PERSONAL HISTORY OF OTHER MEDICAL TREATMENT: ICD-10-CM

## 2021-10-25 DIAGNOSIS — Z23 ENCOUNTER FOR IMMUNIZATION: ICD-10-CM

## 2021-10-25 DIAGNOSIS — E66.01 OBESITY COMPLICATING PREGNANCY, UNSPECIFIED TRIMESTER: ICD-10-CM

## 2021-10-25 DIAGNOSIS — Z80.3 FAMILY HISTORY OF MALIGNANT NEOPLASM OF BREAST: ICD-10-CM

## 2021-10-25 DIAGNOSIS — B96.89 ACUTE VAGINITIS: ICD-10-CM

## 2021-10-25 DIAGNOSIS — O99.210 OBESITY COMPLICATING PREGNANCY, UNSPECIFIED TRIMESTER: ICD-10-CM

## 2021-10-25 DIAGNOSIS — Z3A.27 27 WEEKS GESTATION OF PREGNANCY: ICD-10-CM

## 2021-10-25 DIAGNOSIS — Z3A.14 14 WEEKS GESTATION OF PREGNANCY: ICD-10-CM

## 2021-10-25 DIAGNOSIS — E66.09 OTHER OBESITY DUE TO EXCESS CALORIES: ICD-10-CM

## 2021-10-25 PROCEDURE — 93000 ELECTROCARDIOGRAM COMPLETE: CPT | Mod: 59

## 2021-10-25 PROCEDURE — 99385 PREV VISIT NEW AGE 18-39: CPT | Mod: 25

## 2021-10-25 PROCEDURE — 36415 COLL VENOUS BLD VENIPUNCTURE: CPT

## 2021-10-25 PROCEDURE — 99204 OFFICE O/P NEW MOD 45 MIN: CPT | Mod: 25

## 2021-10-25 RX ORDER — ASCORBIC ACID, CHOLECALCIFEROL, .ALPHA.-TOCOPHEROL ACETATE, DL-, PYRIDOXINE, FOLIC ACID, CYANOCOBALAMIN, CALCIUM, FERROUS FUMARATE, MAGNESIUM, DOCONEXENT 85; 200; 10; 25; 1; 12; 140; 27; 45; 300 [IU]/1; [IU]/1; [IU]/1; [IU]/1; MG/1; UG/1; MG/1; MG/1; MG/1; MG/1
27-0.6-0.4-3 CAPSULE, GELATIN COATED ORAL
Qty: 90 | Refills: 3 | Status: DISCONTINUED | COMMUNITY
Start: 2020-01-10 | End: 2021-10-25

## 2021-10-25 RX ORDER — BLOOD-GLUCOSE METER
KIT MISCELLANEOUS
Qty: 1 | Refills: 0 | Status: DISCONTINUED | COMMUNITY
Start: 2020-03-09 | End: 2021-10-25

## 2021-10-25 RX ORDER — METRONIDAZOLE 7.5 MG/G
0.75 GEL VAGINAL
Qty: 1 | Refills: 0 | Status: DISCONTINUED | COMMUNITY
Start: 2021-04-08 | End: 2021-10-25

## 2021-11-04 ENCOUNTER — APPOINTMENT (OUTPATIENT)
Dept: CARDIOLOGY | Facility: CLINIC | Age: 31
End: 2021-11-04
Payer: COMMERCIAL

## 2021-11-04 VITALS
RESPIRATION RATE: 16 BRPM | OXYGEN SATURATION: 98 % | HEART RATE: 94 BPM | BODY MASS INDEX: 50.61 KG/M2 | SYSTOLIC BLOOD PRESSURE: 139 MMHG | HEIGHT: 62 IN | DIASTOLIC BLOOD PRESSURE: 91 MMHG | WEIGHT: 275 LBS | TEMPERATURE: 98.2 F

## 2021-11-04 PROBLEM — Z87.59 HISTORY OF THREATENED ABORTION: Status: RESOLVED | Noted: 2020-03-03 | Resolved: 2021-11-04

## 2021-11-04 PROBLEM — Z87.59 HISTORY OF THREATENED ABORTION: Status: RESOLVED | Noted: 2020-02-07 | Resolved: 2021-11-04

## 2021-11-04 PROBLEM — O99.810 GLUCOSE INTOLERANCE OF PREGNANCY: Status: RESOLVED | Noted: 2020-06-02 | Resolved: 2021-11-04

## 2021-11-04 PROBLEM — Z86.39 HISTORY OF OBESITY: Status: RESOLVED | Noted: 2020-03-31 | Resolved: 2021-11-04

## 2021-11-04 PROBLEM — Z3A.15 15 WEEKS GESTATION OF PREGNANCY: Status: RESOLVED | Noted: 2020-03-09 | Resolved: 2021-11-04

## 2021-11-04 PROBLEM — E66.09 OBESITY DUE TO EXCESS CALORIES WITH SERIOUS COMORBIDITY, UNSPECIFIED CLASSIFICATION: Status: RESOLVED | Noted: 2020-03-31 | Resolved: 2021-11-04

## 2021-11-04 PROBLEM — O99.210 MATERNAL MORBID OBESITY, ANTEPARTUM: Status: RESOLVED | Noted: 2020-01-06 | Resolved: 2021-11-04

## 2021-11-04 PROBLEM — O21.9 NAUSEA AND VOMITING OF PREGNANCY, ANTEPARTUM: Status: RESOLVED | Noted: 2020-02-03 | Resolved: 2021-11-04

## 2021-11-04 PROCEDURE — 99204 OFFICE O/P NEW MOD 45 MIN: CPT

## 2021-11-04 NOTE — HISTORY OF PRESENT ILLNESS
[de-identified] : 31 y/o female patient is new to the office presents today for annual CPE/fasting labs\par - HCM: last PAP 04/2021 WNL \par - c/o intermittent CP/SOB/BUTCHER/intermittent wheezing, hx of Covid-19 infection, no hx of asthma, elevated BP in office, hx of gestational HTN/pre-eclampsia with last pregnancy last year \par - c/o snoring, whistling sensation when speaking

## 2021-11-04 NOTE — HISTORY OF PRESENT ILLNESS
[FreeTextEntry1] : 30F presents to establish cardiovascular care\par Sent in by PMD: Dr al Larson\par  \par pt has been experiencing CP since her getting covid 12/2020. pt states she has had episodes and palpitations since getting covid. episodes occur 2-3 times a week, not every week. cp episodes and palpitations episodes re separate. they can last for a several minutes and then resolve.  denies assoc sob, dizziness, syncope syncope.\par \par pt states chronic anxiety, states when she is nervous the episodes can occur more frequently. pt can have them with exertion as well. \par \par  \par Exercise: none\par Diet: none\par \par Prior cardiac workup: none\par Recent labs: 10/21: hgb 12.3 tot chol 173 tg 146 hdl 37 ldl 136 Cr 0.88 gfr 87 tsh 1.12 a1c 6.0\par  \par EKG: SR\par \par  \par Med hx: COVID 12/2020 BMI 50\par OBGYN hx: 2 kids, + hx of preeclampsia,+ gest dm, Currently with regualr menstrual cycles. No hx of miscarriage. \par Sx hx: c/s x2\par Fam hx:no known cardiac hx\par Social hx: lives in [place] with boyfriend and kids. works in [employer]. no tob, social etoh, no drugs\par Meds: none\par Allergies: nkda\par

## 2021-11-04 NOTE — DISCUSSION/SUMMARY
[FreeTextEntry1] : 30F with BMI 50, hx of covid, presents to establish cardiovascular care\par \par 1. CP/palpitations\par -pt with intermittent episodes since covid, unclear etiology\par -ekg showing SR\par -will check tte to eval LV function, RV function, filling pressures\par -will check extended holter monitor \par \par f/u after testing

## 2021-11-04 NOTE — HEALTH RISK ASSESSMENT
[] : No [Yes] : Yes [Monthly or less (1 pt)] : Monthly or less (1 point) [1 or 2 (0 pts)] : 1 or 2 (0 points) [Never (0 pts)] : Never (0 points) [No] : In the past 12 months have you used drugs other than those required for medical reasons? No [0] : 2) Feeling down, depressed, or hopeless: Not at all (0) [PHQ-2 Negative - No further assessment needed] : PHQ-2 Negative - No further assessment needed [Audit-CScore] : 1 [[id number]] : 0 [Patient reported PAP Smear was normal] : Patient reported PAP Smear was normal [Change in mental status noted] : No change in mental status noted [None] : None [With Family] : lives with family [Employed] : employed [Significant Other] : lives with significant other [# Of Children ___] : has [unfilled] children [Sexually Active] : sexually active [High Risk Behavior] : no high risk behavior [Feels Safe at Home] : Feels safe at home [Reports changes in hearing] : Reports no changes in hearing [Reports changes in vision] : Reports no changes in vision [Reports normal functional visual acuity (ie: able to read med bottle)] : Reports normal functional visual acuity [Reports changes in dental health] : Reports no changes in dental health [PapSmearDate] : 04/2021

## 2021-11-04 NOTE — REVIEW OF SYSTEMS
[Chest Pain] : chest pain [Palpitations] : no palpitations [Leg Claudication] : no leg claudication [Lower Ext Edema] : no lower extremity edema [Orthopnea] : no orthopnea [Paroxysmal Nocturnal Dyspnea] : no paroxysmal nocturnal dyspnea [Shortness Of Breath] : shortness of breath [Wheezing] : wheezing [Cough] : no cough [Dyspnea on Exertion] : dyspnea on exertion [Negative] : Heme/Lymph

## 2021-11-11 ENCOUNTER — APPOINTMENT (OUTPATIENT)
Dept: PULMONOLOGY | Facility: CLINIC | Age: 31
End: 2021-11-11
Payer: COMMERCIAL

## 2021-11-11 VITALS
OXYGEN SATURATION: 98 % | HEART RATE: 89 BPM | TEMPERATURE: 97.9 F | RESPIRATION RATE: 16 BRPM | SYSTOLIC BLOOD PRESSURE: 137 MMHG | HEIGHT: 62 IN | WEIGHT: 276 LBS | DIASTOLIC BLOOD PRESSURE: 90 MMHG | BODY MASS INDEX: 50.79 KG/M2

## 2021-11-11 PROCEDURE — 99203 OFFICE O/P NEW LOW 30 MIN: CPT

## 2021-11-11 NOTE — DISCUSSION/SUMMARY
[FreeTextEntry1] : 30 year old patient presents for evaluation of shortness of breath \par \par She has gained 50 pounds\par \par She had preeclampsia\par \par She has anxiety\par \par She also reports palpitations.   she is undergoing cardiologic evaluation and will have TTE\par \par She has low HDL\par \par low Vit D\par \par PLAN\par \par PFT\par \par CXR\par \par Further recommendations based on results.\par \par advised on healthy diet and exercise\par \par she will contact PMDre blood tests\par \par Consider evaluation for sleep apnea\par \par Larry Laboy MD \par

## 2021-11-11 NOTE — PHYSICAL EXAM
[No Acute Distress] : no acute distress [Well Nourished] : well nourished [Normal Oropharynx] : normal oropharynx [Enlarged Base of the Tongue] : enlarged base of the tongue [II] : Mallampati Class: II [Normal Appearance] : normal appearance [Supple] : supple [No Neck Mass] : no neck mass [Normal Rate/Rhythm] : normal rate/rhythm [Normal S1, S2] : normal s1, s2 [No Murmurs] : no murmurs [No Resp Distress] : no resp distress [Clear to Auscultation Bilaterally] : clear to auscultation bilaterally [Benign] : benign [Not Tender] : not tender [No Masses] : no masses [No Clubbing] : no clubbing [No Edema] : no edema [No Focal Deficits] : no focal deficits [Oriented x3] : oriented x3 [Normal Affect] : normal affect [TextBox_2] : elevated BMI

## 2021-11-11 NOTE — HISTORY OF PRESENT ILLNESS
[Daytime Somnolence] : daytime somnolence [Snoring] : snoring [TextBox_4] : 30 year old patient presents for evaluation of shortness of breath \par \par The problem started 6-7 months.  She had infection with COVID and had dyspnea and malaise.  she was not hospitalized and did not take specific medications months ago.\par \par The shortness of breath is daily, worse with exertion, worse with talking. She has slight wheeze. She denies chest pain or tightness. She notes tachycardia and tachypnea.  No cough or sputum. \par \par She has no active pulmonary problems.  She denies history of COPD, asthma, pneumonia, chest pain, postnasal drip,  sinusitis, wheeze, cough, sputum production or shortness of breath. \par \par She also has described palpitations. \par \par She has had anxiety, which has worsened.  She states that that she has gained 50 pounds.  She had her daughter July 2020.  She had preeclampsia.\par \par She has no pedal edema. \par She reports orthopnea, developing dyspnea.\par \par She has been told of loud snoring. \par \par \par \par Primary doctor is Dr Larson\par \par \par PSH:\par \par c section\par \par \par PMH:\par \par elevated BP\par \par elevated BMI\par \par \par SH:\par \par never smoker\par \par \par ETOH:  occasional\par \par \par Occupation: works as \par \par No exposure to chemicals, dust, asbestos, mold\par \par ALLERGY:\par \par NKDA\par \par \par environmental/seasonal allergy: mild to ? grass, cut grass\par \par \par \par Review of Systems:\par \par psoriasis\par No dry eyes\par no mouth ulcers\par no sinusitis, sinus infections, nasal obstruction\par no dysphagia\par no dry mouth\par \par no arthritis\par no joint aches\par no joint swelling\par \par \par no pneumonia\par no wheeze\par no lung cancer\par \par no CAD\par no MI\par no chest pain\par no murmur\par no CHF\par \par no edema\par \par no peptic ulcer or gastritis\par no GERD\par no abdominal pain\par no liver disease\par \par no Diabetes\par no thyroid disease\par no hyperlipidemia\par \par \par no bleeding\par \par no DVT or PE\par \par no kidney disease\par \par no stroke\par no seizure\par \par \par \par \par \par \par \par \par \par \par \par   [Hypersomnolence] : denies hypersomnolence

## 2021-11-11 NOTE — REVIEW OF SYSTEMS
[Fever] : no fever [Nasal Congestion] : no nasal congestion [Dyspnea] : dyspnea [Chest Discomfort] : no chest discomfort [Palpitations] : palpitations

## 2021-11-11 NOTE — REASON FOR VISIT
[Consultation] : a consultation [Shortness of Breath] : shortness of breath [TextBox_44] :  history of COVID infection

## 2021-11-14 ENCOUNTER — TRANSCRIPTION ENCOUNTER (OUTPATIENT)
Age: 31
End: 2021-11-14

## 2021-11-14 DIAGNOSIS — Z86.2 PERSONAL HISTORY OF DISEASES OF THE BLOOD AND BLOOD-FORMING ORGANS AND CERTAIN DISORDERS INVOLVING THE IMMUNE MECHANISM: ICD-10-CM

## 2021-11-14 DIAGNOSIS — E78.6 LIPOPROTEIN DEFICIENCY: ICD-10-CM

## 2021-11-14 LAB
25(OH)D3 SERPL-MCNC: 11.4 NG/ML
ALBUMIN SERPL ELPH-MCNC: 4.4 G/DL
ALP BLD-CCNC: 60 U/L
ALT SERPL-CCNC: 15 U/L
ANION GAP SERPL CALC-SCNC: 13 MMOL/L
APPEARANCE: ABNORMAL
AST SERPL-CCNC: 18 U/L
BACTERIA: NEGATIVE
BASOPHILS # BLD AUTO: 0.04 K/UL
BASOPHILS NFR BLD AUTO: 0.5 %
BILIRUB SERPL-MCNC: 0.3 MG/DL
BILIRUBIN URINE: NEGATIVE
BLOOD URINE: NORMAL
BUN SERPL-MCNC: 13 MG/DL
CALCIUM SERPL-MCNC: 9.7 MG/DL
CHLORIDE SERPL-SCNC: 105 MMOL/L
CHOLEST SERPL-MCNC: 173 MG/DL
CO2 SERPL-SCNC: 20 MMOL/L
COLOR: YELLOW
CREAT SERPL-MCNC: 0.88 MG/DL
EOSINOPHIL # BLD AUTO: 0.06 K/UL
EOSINOPHIL NFR BLD AUTO: 0.8 %
ESTIMATED AVERAGE GLUCOSE: 126 MG/DL
FERRITIN SERPL-MCNC: 18 NG/ML
GLUCOSE QUALITATIVE U: NEGATIVE
GLUCOSE SERPL-MCNC: 84 MG/DL
HBA1C MFR BLD HPLC: 6 %
HCT VFR BLD CALC: 39.7 %
HDLC SERPL-MCNC: 37 MG/DL
HGB BLD-MCNC: 12.3 G/DL
HYALINE CASTS: 0 /LPF
IMM GRANULOCYTES NFR BLD AUTO: 0.3 %
IRON SATN MFR SERPL: 17 %
IRON SERPL-MCNC: 69 UG/DL
KETONES URINE: NEGATIVE
LDLC SERPL CALC-MCNC: 107 MG/DL
LEUKOCYTE ESTERASE URINE: NEGATIVE
LYMPHOCYTES # BLD AUTO: 3.04 K/UL
LYMPHOCYTES NFR BLD AUTO: 38.5 %
MAGNESIUM SERPL-MCNC: 2 MG/DL
MAN DIFF?: NORMAL
MCHC RBC-ENTMCNC: 27.2 PG
MCHC RBC-ENTMCNC: 31 GM/DL
MCV RBC AUTO: 87.6 FL
MICROSCOPIC-UA: NORMAL
MONOCYTES # BLD AUTO: 0.51 K/UL
MONOCYTES NFR BLD AUTO: 6.5 %
NEUTROPHILS # BLD AUTO: 4.22 K/UL
NEUTROPHILS NFR BLD AUTO: 53.4 %
NITRITE URINE: NEGATIVE
NONHDLC SERPL-MCNC: 136 MG/DL
PH URINE: 6
PLATELET # BLD AUTO: 433 K/UL
POTASSIUM SERPL-SCNC: 4.5 MMOL/L
PROT SERPL-MCNC: 7.5 G/DL
PROTEIN URINE: ABNORMAL
RBC # BLD: 4.53 M/UL
RBC # FLD: 14.8 %
RED BLOOD CELLS URINE: 1 /HPF
SODIUM SERPL-SCNC: 138 MMOL/L
SPECIFIC GRAVITY URINE: 1.03
SQUAMOUS EPITHELIAL CELLS: 3 /HPF
T3FREE SERPL-MCNC: 3.38 PG/ML
T4 FREE SERPL-MCNC: 1.3 NG/DL
THYROGLOB AB SERPL-ACNC: <20 IU/ML
THYROPEROXIDASE AB SERPL IA-ACNC: <10 IU/ML
TIBC SERPL-MCNC: 399 UG/DL
TRIGL SERPL-MCNC: 146 MG/DL
TSH SERPL-ACNC: 1.12 UIU/ML
UIBC SERPL-MCNC: 331 UG/DL
UROBILINOGEN URINE: NORMAL
VIT B12 SERPL-MCNC: 421 PG/ML
WBC # FLD AUTO: 7.89 K/UL
WHITE BLOOD CELLS URINE: 3 /HPF

## 2021-11-23 ENCOUNTER — NON-APPOINTMENT (OUTPATIENT)
Age: 31
End: 2021-11-23

## 2021-11-23 ENCOUNTER — APPOINTMENT (OUTPATIENT)
Dept: CARDIOLOGY | Facility: CLINIC | Age: 31
End: 2021-11-23
Payer: COMMERCIAL

## 2021-11-23 PROCEDURE — 93306 TTE W/DOPPLER COMPLETE: CPT

## 2021-11-23 PROCEDURE — 93242 EXT ECG>48HR<7D RECORDING: CPT

## 2021-11-23 PROCEDURE — 93244 EXT ECG>48HR<7D REV&INTERPJ: CPT

## 2021-12-09 ENCOUNTER — APPOINTMENT (OUTPATIENT)
Dept: PULMONOLOGY | Facility: CLINIC | Age: 31
End: 2021-12-09

## 2021-12-28 NOTE — OB RN PREOPERATIVE CHECKLIST - NS_PREOPMEDADMIN_OBGYN_ALL_OB
Patient participated with therapy, states that she feels weak during ambulation, she would like to hold off any medications until after dialysis. Insulin and prn anxiety medications given. Nephrology consult completed. Yes, see eMAR

## 2022-01-03 ENCOUNTER — NON-APPOINTMENT (OUTPATIENT)
Age: 32
End: 2022-01-03

## 2022-01-06 ENCOUNTER — APPOINTMENT (OUTPATIENT)
Dept: PULMONOLOGY | Facility: CLINIC | Age: 32
End: 2022-01-06
Payer: COMMERCIAL

## 2022-01-06 VITALS
TEMPERATURE: 98 F | WEIGHT: 279.5 LBS | DIASTOLIC BLOOD PRESSURE: 87 MMHG | SYSTOLIC BLOOD PRESSURE: 127 MMHG | HEART RATE: 78 BPM | HEIGHT: 62 IN | RESPIRATION RATE: 16 BRPM | OXYGEN SATURATION: 97 % | BODY MASS INDEX: 51.43 KG/M2

## 2022-01-06 PROCEDURE — 99214 OFFICE O/P EST MOD 30 MIN: CPT | Mod: 25

## 2022-01-06 PROCEDURE — 94010 BREATHING CAPACITY TEST: CPT | Mod: 26,59

## 2022-01-06 PROCEDURE — 94729 DIFFUSING CAPACITY: CPT

## 2022-01-06 PROCEDURE — 94727 GAS DIL/WSHOT DETER LNG VOL: CPT

## 2022-01-06 PROCEDURE — 94617 EXERCISE TST BRNCSPSM W/ECG: CPT

## 2022-01-06 RX ORDER — ALBUTEROL SULFATE 90 UG/1
108 (90 BASE) INHALANT RESPIRATORY (INHALATION)
Qty: 1 | Refills: 3 | Status: DISCONTINUED | COMMUNITY
Start: 2022-01-06 | End: 2022-01-06

## 2022-01-06 NOTE — PROCEDURE
[FreeTextEntry1] : PFT\par \par normal spirometry, lung volumes and diffusion\par \par TLC mildly diminished, likely due to body habitus\par \par 6 minute Walk, exercise oximetry \par \par Pt demonstrated no O2 desaturation but she had tachycardia to 130 beats per minute\par \par Larry Laboy MD

## 2022-01-06 NOTE — DISCUSSION/SUMMARY
[FreeTextEntry1] : 31 year old patient presents for evaluation of shortness of breath \par \par She had COVID infection 12/2020\par \par She has gained 50 pounds\par \par She had  history of preeclampsia\par \par She has anxiety\par \par She also reports palpitations.   she is undergoing cardiologic evaluation and did have echo, which was normal.  She has had Holter monitor, results pending\par \par She has low HDL and low Vit D\par \par PFT with very mild restriction, no obstruction.  DLCO is normal\par \par Exercise oximetry demonstrated no oxygen desaturation though there was tachycardia\par \par She still reports dyspnea\par \par She has been told that she snores loudly\par \par PLAN\par \par CXR\par \par I will order overnight sleep study t to rule out  sleep apnea\par \par complete cardiologic evaluation\par \par despite PFT, i will prescribe an albuterol inhaler to use as needed as empiric trial\par \par Further recommendations based on results and symptoms.\par \par advised on healthy diet and exercise, weight loss\par \par Total time spent : 30 minutes\par Including:\par Preparation prior to visit - Reviewing prior record, results of tests and Consultation Reports as applicable\par Conducting an appropriate H & P during today's encounter\par Appropriate orders for tests, medications and procedures, as applicable\par Counseling patient \par Note completion \par Larry Laboy MD \par

## 2022-01-06 NOTE — HISTORY OF PRESENT ILLNESS
[Daytime Somnolence] : daytime somnolence [Snoring] : snoring [TextBox_4] : 31 year old patient presented for evaluation of shortness of breath \par \par The problem started 6-7 months.  She had infection with COVID and had dyspnea and malaise.  she was not hospitalized and did not take specific medications \par \par The shortness of breath is daily, worse with exertion, worse with talking. She has slight wheeze. She denies chest pain or tightness. She notes tachycardia and tachypnea.  No cough or sputum. \par \par She has no active pulmonary problems.  She denies history of COPD, asthma, pneumonia, chest pain, postnasal drip,  sinusitis, wheeze, cough, sputum production or shortness of breath. \par \par She also has described palpitations. She has had monitor and awaits results.\par \par She has had anxiety, which has worsened.  She states that that she has gained 50 pounds.  She had her daughter July 2020.  She had preeclampsia.\par \par She has no pedal edema. \par She reports orthopnea, developing dyspnea.\par \par She has been told of loud snoring. \par \par \par \par Primary doctor is Dr Larson\par \par \par PSH:\par \par c section\par \par \par PMH:\par \par elevated BP\par \par elevated BMI\par \par \par SH:\par \par never smoker\par \par \par ETOH:  occasional\par \par \par Occupation: works as \par \par No exposure to chemicals, dust, asbestos, mold\par \par ALLERGY:\par \par NKDA\par \par \par environmental/seasonal allergy: mild to ? grass, cut grass\par \par \par \par Review of Systems:\par \par psoriasis\par No dry eyes\par no mouth ulcers\par no sinusitis, sinus infections, nasal obstruction\par no dysphagia\par no dry mouth\par \par no arthritis\par no joint aches\par no joint swelling\par \par \par no pneumonia\par no wheeze\par no lung cancer\par \par no CAD\par no MI\par no chest pain\par no murmur\par no CHF\par \par no edema\par \par no peptic ulcer or gastritis\par no GERD\par no abdominal pain\par no liver disease\par \par no Diabetes\par no thyroid disease\par no hyperlipidemia\par \par \par no bleeding\par \par no DVT or PE\par \par no kidney disease\par \par no stroke\par no seizure\par \par \par \par \par \par \par \par \par \par \par \par   [Hypersomnolence] : denies hypersomnolence

## 2022-02-10 ENCOUNTER — NON-APPOINTMENT (OUTPATIENT)
Age: 32
End: 2022-02-10

## 2022-02-10 ENCOUNTER — APPOINTMENT (OUTPATIENT)
Dept: BARIATRICS | Facility: CLINIC | Age: 32
End: 2022-02-10
Payer: COMMERCIAL

## 2022-02-10 VITALS
HEIGHT: 62 IN | OXYGEN SATURATION: 98 % | SYSTOLIC BLOOD PRESSURE: 130 MMHG | HEART RATE: 103 BPM | TEMPERATURE: 97 F | WEIGHT: 278 LBS | DIASTOLIC BLOOD PRESSURE: 80 MMHG | BODY MASS INDEX: 51.16 KG/M2

## 2022-02-10 PROCEDURE — 99205 OFFICE O/P NEW HI 60 MIN: CPT

## 2022-02-10 RX ORDER — ERGOCALCIFEROL 1.25 MG/1
1.25 MG CAPSULE ORAL
Qty: 15 | Refills: 1 | Status: DISCONTINUED | COMMUNITY
Start: 2021-11-14 | End: 2022-02-10

## 2022-02-10 RX ORDER — ALBUTEROL SULFATE 90 UG/1
108 (90 BASE) INHALANT RESPIRATORY (INHALATION)
Qty: 1 | Refills: 3 | Status: DISCONTINUED | COMMUNITY
Start: 2022-01-06 | End: 2022-02-10

## 2022-02-11 NOTE — ASSESSMENT
[FreeTextEntry1] : This patient presents today with a BMI of 50.85 kg/m2 and co-morbid conditions including prediabetes, anemia, weight gain, suspected obstructive sleep apnea.  \par \par Today we have discussed the Risks, Benefits, and Alternatives to surgical intervention including Laparoscopic Adjustable Gastric Band, Laparoscopic Fior-En-Y Gastric Bypass as well as Laparoscopic Sleeve Gastrectomy.  All questions have been answered.  Collectively we have determined this patient to be best suited for Laparoscopic Sleeve Gastrectomy.\par \par Risk, Benefits, and Alternatives to surgery have been discussed.  This includes but is not limited to bleeding, infection, damage to adjacent structures, need for additional surgery or interventions, adverse effects of anesthesia such as cardio-respiratory complications, prolonged intubation, cardiac arrhythmia, arrest, and or death.  Risks of forgoing surgery have also been discussed including progression of, and/or worsening of current condition which may then require urgent or emergent treatment or surgery.\par \par This process is quite extensive requiring the patient to undergo preoperative assessments including evaluation and documented weight history by his/her  Primary Care Physician, Evaluation and treatment by a  Psychiatrist, Nutritionist, Cardiologist, Pulmonologist in addition to preoperative upper endoscopy. In addition, 3-6 consecutive months of medically managed preoperative weight loss counseling will also be required. Throughout this process I anticipate and would expect an EBL (Excess Body Weight Loss) between 10 and 15% prior to surgery.\par \par Nutritional counseling has been provided. The patient is encouraged to remain calorie conscious and continue a low fat, low carbohydrate, high protein diet. Also, emphasized has been placed on the importance of adequate hydration and multi-vitamin supplementation and exercise.  (15 min)\par \par Pending preoperative workup and clearance is in accordance with NIH criteria, this patient will be scheduled electively for Laparoscopic Sleeve Gastrectomy.\par \par Preoperative protocols and referrals have been reviewed at length with the patient and all questions have been answered. Followup in one to 2 months to reassess weight loss progression and facilitate any additional preoperative clearances.

## 2022-02-11 NOTE — CONSULT LETTER
[Dear  ___] : Dear  [unfilled], [Courtesy Letter:] : I had the pleasure of seeing your patient, [unfilled], in my office today. [Please see my note below.] : Please see my note below. [Consult Closing:] : Thank you very much for allowing me to participate in the care of this patient.  If you have any questions, please do not hesitate to contact me. [Sincerely,] : Sincerely, [FreeTextEntry3] : Chong Rojas MD, FACS, FASMBS\par Chair, Dept of Surgery, [facility]\par [facility]\par [facility]\par [facility]\par [address]\[place] [address]\par P [phone]\par F [phone]\par

## 2022-02-11 NOTE — HISTORY OF PRESENT ILLNESS
[de-identified] : This is a 31 year-year-old morbidly obese woman presenting today to discuss surgical options for weight loss. Despite multiple efforts at diet and exercise this patient has been unable to achieve and/or maintain significant weight loss.\par \yumiko Works in [employer] @ [employer]

## 2022-02-22 ENCOUNTER — APPOINTMENT (OUTPATIENT)
Dept: OBGYN | Facility: CLINIC | Age: 32
End: 2022-02-22
Payer: COMMERCIAL

## 2022-02-22 VITALS
HEIGHT: 62 IN | SYSTOLIC BLOOD PRESSURE: 132 MMHG | WEIGHT: 279 LBS | DIASTOLIC BLOOD PRESSURE: 90 MMHG | BODY MASS INDEX: 51.34 KG/M2

## 2022-02-22 DIAGNOSIS — N76.0 ACUTE VAGINITIS: ICD-10-CM

## 2022-02-22 DIAGNOSIS — B96.89 ACUTE VAGINITIS: ICD-10-CM

## 2022-02-22 PROCEDURE — 99213 OFFICE O/P EST LOW 20 MIN: CPT

## 2022-02-22 NOTE — PHYSICAL EXAM
[Appropriately responsive] : appropriately responsive [Alert] : alert [No Acute Distress] : no acute distress [Soft] : soft [Non-tender] : non-tender [Labia Majora] : normal [Labia Minora] : normal [Normal] : normal [Discharge] : a  ~M vaginal discharge was present [Foul Smelling] : foul smelling [Clear] : clear [Watery] : watery [Blood-Tinged] : blood-tinged

## 2022-02-23 LAB
C TRACH RRNA SPEC QL NAA+PROBE: NOT DETECTED
N GONORRHOEA RRNA SPEC QL NAA+PROBE: NOT DETECTED
SOURCE AMPLIFICATION: NORMAL
SOURCE AMPLIFICATION: NORMAL
T VAGINALIS RRNA SPEC QL NAA+PROBE: NOT DETECTED

## 2022-02-23 NOTE — HISTORY OF PRESENT ILLNESS
[Y] : Patient uses contraception [IUD] : has an intrauterine device [FreeTextEntry1] : 32 yo presents with complaint of pelvic pain and vaginal discharge with occasional odor x a few months [PapSmeardate] : 4/6/21 [LMPDate] : 2/3/22 [de-identified] : Mirena [FreeTextEntry4] : Patient would like Gonorrhea and Chlamydia screening today.  Declines other STI blood work at this time\par

## 2022-02-23 NOTE — DISCUSSION/SUMMARY
[FreeTextEntry1] : Avoid douching, sugary foods, constrictive clothing, perfumed feminine products\par Keep area clean and dry\par Change pads and tampons every 4-8 hours\par Use mild unscented soap or plain water to clean vagina at least once daily\par Wear cotton underwear\par Wipe carefully after bowel movements\par Referral for transvaginal transabdominal sonogram given

## 2022-02-28 LAB
CANDIDA VAG CYTO: NOT DETECTED
G VAGINALIS+PREV SP MTYP VAG QL MICRO: DETECTED
T VAGINALIS VAG QL WET PREP: NOT DETECTED

## 2022-03-03 ENCOUNTER — EMERGENCY (EMERGENCY)
Facility: HOSPITAL | Age: 32
LOS: 1 days | Discharge: HOME | End: 2022-03-03
Attending: EMERGENCY MEDICINE
Payer: COMMERCIAL

## 2022-03-03 VITALS
RESPIRATION RATE: 18 BRPM | SYSTOLIC BLOOD PRESSURE: 137 MMHG | OXYGEN SATURATION: 100 % | TEMPERATURE: 98 F | DIASTOLIC BLOOD PRESSURE: 96 MMHG | HEART RATE: 79 BPM

## 2022-03-03 VITALS
RESPIRATION RATE: 20 BRPM | TEMPERATURE: 98 F | SYSTOLIC BLOOD PRESSURE: 173 MMHG | HEART RATE: 122 BPM | WEIGHT: 268.08 LBS | HEIGHT: 62 IN | OXYGEN SATURATION: 100 % | DIASTOLIC BLOOD PRESSURE: 114 MMHG

## 2022-03-03 DIAGNOSIS — Z98.890 OTHER SPECIFIED POSTPROCEDURAL STATES: Chronic | ICD-10-CM

## 2022-03-03 DIAGNOSIS — Z98.891 HISTORY OF UTERINE SCAR FROM PREVIOUS SURGERY: Chronic | ICD-10-CM

## 2022-03-03 PROCEDURE — 99283 EMERGENCY DEPT VISIT LOW MDM: CPT

## 2022-03-03 PROCEDURE — 99284 EMERGENCY DEPT VISIT MOD MDM: CPT

## 2022-03-03 RX ORDER — EMTRICITABINE AND TENOFOVIR DISOPROXIL FUMARATE 200; 300 MG/1; MG/1
1 TABLET, FILM COATED ORAL
Qty: 21 | Refills: 0
Start: 2022-03-03 | End: 2022-03-23

## 2022-03-03 RX ORDER — DOLUTEGRAVIR SODIUM 25 MG/1
1 TABLET, FILM COATED ORAL
Qty: 21 | Refills: 0
Start: 2022-03-03 | End: 2022-03-23

## 2022-03-03 NOTE — ED PROVIDER NOTE - NSFOLLOWUPINSTRUCTIONS_ED_ALL_ED_FT
You were exposed to a fingerstick injury with exposure to urine today. Please follow up the proper [facility] protocols and return to the ED if you experience redness or swelling of your finger, fevers, chills, night sweats, dizziness, chest pain, shortness of breath, or other alarming symptoms. Please follow up with Employee Health Services within 48 hours.     You were exposed to a fingerstick injury with exposure to urine today. Please follow up the proper [facility] protocols and return to the ED if you experience redness or swelling of your finger, fevers, chills, night sweats, dizziness, chest pain, shortness of breath, or other alarming symptoms.

## 2022-03-03 NOTE — ED PROVIDER NOTE - ATTENDING CONTRIBUTION TO CARE
30 y/o f with pmhx denies, presents for fingerstick. Works at [employer] and while transferring urine from one container to another. No blood from patient and she is unsure of the status of the possible patient's urine she was exposed to. She was stuck on right hand index finger with small needle. She is left hand dominant.   Gen.  no acute distress, well appearing female  HEENT:  perrl eomi  Lungs:  b/l bs  CVS: S1S2   Abd;  soft no guarding no distention no ttp  Ext:  no pitting edema no erythema  Neuro: aaox3 no focal deficits  MSK: strength 5/5 b/l upper and lower ext.

## 2022-03-03 NOTE — ED PROVIDER NOTE - PHYSICAL EXAMINATION
GENERAL: well appearing in no acute distress, non-toxic appearing  HEAD: normocephalic, atraumatic  HEENT: normal conjunctiva, oral mucosa moist, uvula midline, no tonsilar exudates, neck supple, no JVD  CARDIAC: regular rate and rhythm, normal S1S2, no appreciable murmurs  PULM: normal breath sounds, clear to ascultation bilaterally, no rales, rhonchi, wheezing  GI: abdomen nondistended, soft, nontender, no guarding, rebound tenderness  : no CVA tenderness b/l, no suprapubic tenderness  NEURO: no focal motor or sensory deficits, CN2-12 intact, normal speech, PERRLA, EOMI, normal gait, AAOx3  MSK: no peripheral edema, no calf tenderness b/l  SKIN: well-perfused, extremities warm, no visible rashes  PSYCH: appropriate mood and affect   Right index finger: No erythema, no edema present

## 2022-03-03 NOTE — ED ADULT NURSE NOTE - NSIMPLEMENTINTERV_GEN_ALL_ED
Implemented All Universal Safety Interventions:  [place] to call system. Call bell, personal items and telephone within reach. Instruct patient to call for assistance. Room bathroom lighting operational. Non-slip footwear when patient is off stretcher. Physically safe environment: no spills, clutter or unnecessary equipment. Stretcher in lowest position, wheels locked, appropriate side rails in place.

## 2022-03-03 NOTE — ED PROVIDER NOTE - CLINICAL SUMMARY MEDICAL DECISION MAKING FREE TEXT BOX
Pt is a 30 y/o female w/o PMH c/o fingerstick injury involving urine without signs of erythema and edema to the finger. Will obtain bloodwork as part of body fluid exposure protocol, offer HIV prophylaxis, and reassess. Pt is a 30 y/o female w/o PMH c/o fingerstick injury involving urine without signs of erythema and edema to the finger. Will obtain bloodwork as part of body fluid exposure protocol, offer HIV prophylaxis, and reassess.  ATTG: : fingerstick, will check labs, discuss with patient risk and benefits of post exposure prophylaxis , explained to patient low risk for transmission of HIV and Hep, but patient prefer to take antivirals while awaiting results and will follow with employee health as outpt.

## 2022-03-03 NOTE — ED PROVIDER NOTE - PATIENT PORTAL LINK FT
You can access the FollowMyHealth Patient Portal offered by [facility] by registering at the following website: [email or web address]. By joining [facility]’s FollowMyHealth portal, you will also be able to view your health information using other applications (apps) compatible with our system.

## 2022-03-03 NOTE — ED PROVIDER NOTE - OBJECTIVE STATEMENT
Pt is a 32 y/o female w/o PMH c/o fingerstick injury. Pt is a [employer] employee who was cleaning up a urine cup when she was stuck with a needle this morning. There was no blood involved. The patient's urine belonged to either MRN [id number] or [id number]. Denies erythema, edema, fevers, chills, nausea, vomiting, chest pain, SOB, abdominal pain. Pt is up to date with TDAP and Hepatitis B vaccines. Pt is a 30 y/o female w/o PMH c/o fingerstick injury. Pt is a [employer] employee who was cleaning up a urine cup when she accidentally stuck herself with a needle this morning. There was no blood involved. The patient's urine belonged to either MRN [id number] or [id number]. Denies erythema, edema, fevers, chills, nausea, vomiting, chest pain, SOB, abdominal pain. Pt is up to date with TDAP and Hepatitis B vaccines.

## 2022-03-03 NOTE — ED ADULT NURSE NOTE - OBJECTIVE STATEMENT
Patient  is  alert  and  oriented  x3. Color is good  and  skin warm to touch. She  sustained a  needle  stick to  her right index  from  an urine  cup.  No  acyive  bleeding noted.

## 2022-03-28 ENCOUNTER — APPOINTMENT (OUTPATIENT)
Dept: BARIATRICS/WEIGHT MGMT | Facility: CLINIC | Age: 32
End: 2022-03-28
Payer: COMMERCIAL

## 2022-03-28 VITALS — HEIGHT: 62 IN | WEIGHT: 279 LBS | BODY MASS INDEX: 51.34 KG/M2

## 2022-03-28 PROCEDURE — 90791 PSYCH DIAGNOSTIC EVALUATION: CPT | Mod: 95

## 2022-04-14 ENCOUNTER — APPOINTMENT (OUTPATIENT)
Dept: BARIATRICS | Facility: CLINIC | Age: 32
End: 2022-04-14
Payer: COMMERCIAL

## 2022-04-14 VITALS
TEMPERATURE: 96.7 F | DIASTOLIC BLOOD PRESSURE: 84 MMHG | HEIGHT: 62 IN | HEART RATE: 85 BPM | OXYGEN SATURATION: 97 % | SYSTOLIC BLOOD PRESSURE: 130 MMHG | WEIGHT: 279 LBS | BODY MASS INDEX: 51.34 KG/M2

## 2022-04-14 PROCEDURE — 99213 OFFICE O/P EST LOW 20 MIN: CPT

## 2022-04-14 RX ORDER — METRONIDAZOLE 500 MG/1
500 TABLET ORAL TWICE DAILY
Qty: 14 | Refills: 0 | Status: DISCONTINUED | COMMUNITY
Start: 2022-02-22 | End: 2022-04-14

## 2022-04-14 NOTE — PHYSICAL EXAM
[Obese, well nourished, in no acute distress] : obese, well nourished, in no acute distress [Normal] : affect appropriate [de-identified] : Obese, soft, nontender, nondistended, positive bowel sounds in all four quadrants.  No hernia or masses.  Pfannenstiel incision from .

## 2022-04-14 NOTE — ASSESSMENT
[FreeTextEntry1] : 31-year-old morbidly obese woman returning for ongoing evaluation and is a patient for laparoscopic sleeve gastrectomy.  The patient has gained 1 pound since her previous encounter.  She has seen psychology in consultation for surgery but has not completed any of the additionally requested evaluations.  Still pending is the pulmonary cardiac nutrition and GI evaluations.  She has scheduled her nutrition consultation for April 21 the remaining consultations have yet to be arranged.\par \par Nutritional counseling has been provided. The patient is encouraged to remain calorie conscious and continue a low fat, low carbohydrate, high protein diet. Also, emphasis has been placed on the importance of adequate hydration, multi-vitamin supplementation and exercise.  (15 min)\par \par I have stressed with the patient once again the importance of preoperative weight loss and risk reduction for surgery.  All questions have been answered.\par \par Follow-up in 2 months

## 2022-04-21 ENCOUNTER — APPOINTMENT (OUTPATIENT)
Dept: BARIATRICS/WEIGHT MGMT | Facility: CLINIC | Age: 32
End: 2022-04-21

## 2022-04-21 ENCOUNTER — APPOINTMENT (OUTPATIENT)
Dept: PULMONOLOGY | Facility: CLINIC | Age: 32
End: 2022-04-21

## 2022-06-09 ENCOUNTER — NON-APPOINTMENT (OUTPATIENT)
Age: 32
End: 2022-06-09

## 2022-06-09 ENCOUNTER — APPOINTMENT (OUTPATIENT)
Dept: CARDIOLOGY | Facility: CLINIC | Age: 32
End: 2022-06-09
Payer: COMMERCIAL

## 2022-06-09 VITALS
WEIGHT: 284.13 LBS | SYSTOLIC BLOOD PRESSURE: 138 MMHG | DIASTOLIC BLOOD PRESSURE: 88 MMHG | HEIGHT: 62 IN | OXYGEN SATURATION: 99 % | HEART RATE: 101 BPM | BODY MASS INDEX: 52.29 KG/M2 | TEMPERATURE: 98.3 F

## 2022-06-09 DIAGNOSIS — Z86.16 PERSONAL HISTORY OF COVID-19: ICD-10-CM

## 2022-06-09 PROCEDURE — 99215 OFFICE O/P EST HI 40 MIN: CPT

## 2022-06-09 PROCEDURE — 93000 ELECTROCARDIOGRAM COMPLETE: CPT

## 2022-06-09 NOTE — PHYSICAL EXAM
[Well Developed] : well developed [Well Nourished] : well nourished [No Acute Distress] : no acute distress [Normal Venous Pressure] : normal venous pressure [Normal S1, S2] : normal S1, S2 [No Murmur] : no murmur [Clear Lung Fields] : clear lung fields [Good Air Entry] : good air entry [No Respiratory Distress] : no respiratory distress  [Soft] : abdomen soft [Non Tender] : non-tender [No Edema] : no edema [Moves all extremities] : moves all extremities [No Focal Deficits] : no focal deficits [Alert and Oriented] : alert and oriented

## 2022-07-06 ENCOUNTER — APPOINTMENT (OUTPATIENT)
Dept: BARIATRICS/WEIGHT MGMT | Facility: CLINIC | Age: 32
End: 2022-07-06

## 2022-07-06 ENCOUNTER — OUTPATIENT (OUTPATIENT)
Dept: OUTPATIENT SERVICES | Facility: HOSPITAL | Age: 32
LOS: 1 days | End: 2022-07-06
Payer: COMMERCIAL

## 2022-07-06 VITALS
OXYGEN SATURATION: 99 % | RESPIRATION RATE: 18 BRPM | DIASTOLIC BLOOD PRESSURE: 83 MMHG | SYSTOLIC BLOOD PRESSURE: 120 MMHG | HEIGHT: 62 IN | HEART RATE: 84 BPM | WEIGHT: 279.99 LBS | TEMPERATURE: 98 F

## 2022-07-06 DIAGNOSIS — K21.9 GASTRO-ESOPHAGEAL REFLUX DISEASE WITHOUT ESOPHAGITIS: ICD-10-CM

## 2022-07-06 DIAGNOSIS — E66.01 MORBID (SEVERE) OBESITY DUE TO EXCESS CALORIES: ICD-10-CM

## 2022-07-06 DIAGNOSIS — Z01.818 ENCOUNTER FOR OTHER PREPROCEDURAL EXAMINATION: ICD-10-CM

## 2022-07-06 DIAGNOSIS — Z98.891 HISTORY OF UTERINE SCAR FROM PREVIOUS SURGERY: Chronic | ICD-10-CM

## 2022-07-06 DIAGNOSIS — Z98.890 OTHER SPECIFIED POSTPROCEDURAL STATES: Chronic | ICD-10-CM

## 2022-07-06 LAB
ANION GAP SERPL CALC-SCNC: 9 MMOL/L — SIGNIFICANT CHANGE UP (ref 5–17)
BUN SERPL-MCNC: 8 MG/DL — SIGNIFICANT CHANGE UP (ref 7–23)
CALCIUM SERPL-MCNC: 9.4 MG/DL — SIGNIFICANT CHANGE UP (ref 8.4–10.5)
CHLORIDE SERPL-SCNC: 102 MMOL/L — SIGNIFICANT CHANGE UP (ref 96–108)
CO2 SERPL-SCNC: 25 MMOL/L — SIGNIFICANT CHANGE UP (ref 22–31)
CREAT SERPL-MCNC: 0.59 MG/DL — SIGNIFICANT CHANGE UP (ref 0.5–1.3)
EGFR: 122 ML/MIN/1.73M2 — SIGNIFICANT CHANGE UP
GLUCOSE SERPL-MCNC: 119 MG/DL — HIGH (ref 70–99)
HCT VFR BLD CALC: 39 % — SIGNIFICANT CHANGE UP (ref 34.5–45)
HGB BLD-MCNC: 13.2 G/DL — SIGNIFICANT CHANGE UP (ref 11.5–15.5)
MCHC RBC-ENTMCNC: 28.8 PG — SIGNIFICANT CHANGE UP (ref 27–34)
MCHC RBC-ENTMCNC: 33.8 GM/DL — SIGNIFICANT CHANGE UP (ref 32–36)
MCV RBC AUTO: 85.2 FL — SIGNIFICANT CHANGE UP (ref 80–100)
NRBC # BLD: 0 /100 WBCS — SIGNIFICANT CHANGE UP (ref 0–0)
PLATELET # BLD AUTO: 376 K/UL — SIGNIFICANT CHANGE UP (ref 150–400)
POTASSIUM SERPL-MCNC: 4.2 MMOL/L — SIGNIFICANT CHANGE UP (ref 3.5–5.3)
POTASSIUM SERPL-SCNC: 4.2 MMOL/L — SIGNIFICANT CHANGE UP (ref 3.5–5.3)
RBC # BLD: 4.58 M/UL — SIGNIFICANT CHANGE UP (ref 3.8–5.2)
RBC # FLD: 13.6 % — SIGNIFICANT CHANGE UP (ref 10.3–14.5)
SODIUM SERPL-SCNC: 136 MMOL/L — SIGNIFICANT CHANGE UP (ref 135–145)
WBC # BLD: 8.4 K/UL — SIGNIFICANT CHANGE UP (ref 3.8–10.5)
WBC # FLD AUTO: 8.4 K/UL — SIGNIFICANT CHANGE UP (ref 3.8–10.5)

## 2022-07-06 PROCEDURE — G0463: CPT

## 2022-07-06 PROCEDURE — 80048 BASIC METABOLIC PNL TOTAL CA: CPT

## 2022-07-06 PROCEDURE — 97802 MEDICAL NUTRITION INDIV IN: CPT | Mod: 95

## 2022-07-06 PROCEDURE — 85027 COMPLETE CBC AUTOMATED: CPT

## 2022-07-06 RX ORDER — FERROUS SULFATE 325(65) MG
1 TABLET ORAL
Qty: 0 | Refills: 0 | DISCHARGE

## 2022-07-06 NOTE — H&P PST ADULT - NEGATIVE GASTROINTESTINAL SYMPTOMS
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 PE- WDM in NAD  Alert and oriented times three. Well developed, well nourished. Appropriate affect. Exam of [facility] groin area. ASSESSMENT:    Furuncle, right groin crease  inflammatory papulonodule with central erosion mostly resolved but some thickening persists at site of concern. This bothers him. Prior erosion has healed. Nontender. Not fluctuant. Would like remainder treated. Discussed intralesional steroids he would like to proceed. Discussed risk of dent or depression, infection, loss of pigmentation, recurrence, non response, and possible need for multiple treatments. He stated understanding, verbal consent given. Prep:  Hibiclens  Procedure:  Affected sites injected with Kenalog  10 mg/mL for a total of 0.05 mL. Total of 2 lesions treated. Tolerated procedure well. Call for any redness, swelling, drainage, pain, recurrence, persistence. Small keratoses vs small cysts, scrotum  Small firm flesh-colored papule nodules at site of concern. Differential diagnosis discussed. Itchy, rubbing, and bother him. He would like removed/destroyed if possible. We discussed the risk of bleeding, permanent scarring/discoloration, infection, recurrence. He stated understanding and gave verbal consent. Prep:  Hibiclens  Anesthesia:  1% lidocaine with epinephrine. Procedure: lesions destroyed with gradle scissors and electrodesiccation. Hemostasis:  aluminum chloride and cautery. Dressing:  Polysporin ointment and bandages applied. Given routine oral and written postoperative wound care instructions.   Call if any problems with healing, excess redness, pain, swelling, and/or drainage. Recheck appointment scheduled for 11/17/2022, call sooner for problems. H states understanding of above, had no questions. Inna Kincaid MA attest that I perform the duties of a scribe for services personally performed by Dr Nathaly Pinzon      I, Dr. Nathaly Pinzon attest that I saw and examined the patient and agree with the above documentation as scribed. no nausea/no vomiting/no diarrhea/no constipation

## 2022-07-06 NOTE — H&P PST ADULT - NSICDXPASTMEDICALHX_GEN_ALL_CORE_FT
PAST MEDICAL HISTORY:  Anemia iron def anemia  iron transfusions started end of feb and transfused x 1 unit 3/5    Exposure to COVID-19 virus 2020    Fibroids     History of termination of pregnancy 2014     PAST MEDICAL HISTORY:  Anemia iron def anemia  iron transfusions started end of feb and transfused x 1 unit 3/5    Exposure to COVID-19 virus 2020    Fibroids     History of termination of pregnancy 2014    Obesity, morbid, BMI 50 or higher     DOTTIE (obstructive sleep apnea) by criteria

## 2022-07-06 NOTE — H&P PST ADULT - PROBLEM SELECTOR PLAN 1
EGD with biopsy  cbc, BMP, UCG  on DOS  covid swab @ [facility] on 7/14/22 @14:10  carina precautions BMI 51

## 2022-07-06 NOTE — H&P PST ADULT - NSANTHOSAYNRD_GEN_A_CORE
No. DOTTIE screening performed.  STOP BANG Legend: 0-2 = LOW Risk; 3-4 = INTERMEDIATE Risk; 5-8 = HIGH Risk

## 2022-07-06 NOTE — H&P PST ADULT - HISTORY OF PRESENT ILLNESS
's patient is a 30 y/o s/p repeat c/s with blood pressure of 170/105 in office. Patient reports of headache, visual disturbances and pain under the right breast. Patient reports of her blood pressure being elevated in the PACU. Blood pressure review indicates labile blood pressures, ( 7/28/2020 140s-150s/80s.) Patient denies history of chronic hypertension. Mom reports patient seems more anxious and depressed. Patient does not reports of symptoms.     Medical: Denies  OBGYN/Surgical History:  11/16/2007 arrest of dilation 8lb  7/28/2020 5lb at 35 EGA, premature rupture of membrane 32 y/o morbidly obese woman, BMI of 51.2, DOTTIE by criteria, She had infection with COVID in 2020 and had dyspnea and malaise. she was not hospitalized and did not take specific medications months ago. Pt is planning for Bariatric surgery and Today here for PST for EGD with Biopsy scheduled on 7/14/22.

## 2022-07-06 NOTE — H&P PST ADULT - ENMT
This is 74years old female admitted with upper GI bleed. A&O X4. Denies any pain, numbness and tingling. HR regular. Patient denies any palpitation, chest pain, or chest tightness. Lungs sounds clear, diminished to base. IS encouraged. Abd soft and nontender. Active bowel sounds. Had BM today Continent of bowel & bladder. Depends on. Denies any abd discomfort. Skin dry and intact. Midline to right arm. BLE swollen. Transfers SBA with gait belt, huband and daughter okay to transfer patient. HS medication given. Tolerated well. Education provided on pain management, skin care, fall precaution, and medication. Call light in reach. Patient instructed to call if there is any needs or changes.  Electronically signed by Wyatt Gonzalez RN on 6/27/2021 at 10:25 PM details…

## 2022-07-08 PROBLEM — Z20.822 CONTACT WITH AND (SUSPECTED) EXPOSURE TO COVID-19: Chronic | Status: ACTIVE | Noted: 2022-07-06

## 2022-07-08 PROBLEM — E66.01 MORBID (SEVERE) OBESITY DUE TO EXCESS CALORIES: Chronic | Status: ACTIVE | Noted: 2022-07-06

## 2022-07-11 ENCOUNTER — OUTPATIENT (OUTPATIENT)
Dept: OUTPATIENT SERVICES | Facility: HOSPITAL | Age: 32
LOS: 1 days | End: 2022-07-11
Payer: COMMERCIAL

## 2022-07-11 DIAGNOSIS — Z11.52 ENCOUNTER FOR SCREENING FOR COVID-19: ICD-10-CM

## 2022-07-11 DIAGNOSIS — Z98.890 OTHER SPECIFIED POSTPROCEDURAL STATES: Chronic | ICD-10-CM

## 2022-07-11 DIAGNOSIS — Z98.891 HISTORY OF UTERINE SCAR FROM PREVIOUS SURGERY: Chronic | ICD-10-CM

## 2022-07-11 LAB — SARS-COV-2 RNA SPEC QL NAA+PROBE: SIGNIFICANT CHANGE UP

## 2022-07-11 PROCEDURE — U0005: CPT

## 2022-07-11 PROCEDURE — C9803: CPT

## 2022-07-11 PROCEDURE — U0003: CPT

## 2022-07-14 ENCOUNTER — OUTPATIENT (OUTPATIENT)
Dept: OUTPATIENT SERVICES | Facility: HOSPITAL | Age: 32
LOS: 1 days | End: 2022-07-14
Payer: COMMERCIAL

## 2022-07-14 ENCOUNTER — TRANSCRIPTION ENCOUNTER (OUTPATIENT)
Age: 32
End: 2022-07-14

## 2022-07-14 ENCOUNTER — RESULT REVIEW (OUTPATIENT)
Age: 32
End: 2022-07-14

## 2022-07-14 VITALS
SYSTOLIC BLOOD PRESSURE: 126 MMHG | RESPIRATION RATE: 18 BRPM | OXYGEN SATURATION: 99 % | HEART RATE: 70 BPM | DIASTOLIC BLOOD PRESSURE: 66 MMHG

## 2022-07-14 VITALS
OXYGEN SATURATION: 98 % | RESPIRATION RATE: 15 BRPM | DIASTOLIC BLOOD PRESSURE: 103 MMHG | SYSTOLIC BLOOD PRESSURE: 156 MMHG | HEIGHT: 62 IN | TEMPERATURE: 98 F | HEART RATE: 91 BPM | WEIGHT: 279.99 LBS

## 2022-07-14 DIAGNOSIS — Z98.890 OTHER SPECIFIED POSTPROCEDURAL STATES: Chronic | ICD-10-CM

## 2022-07-14 DIAGNOSIS — Z98.891 HISTORY OF UTERINE SCAR FROM PREVIOUS SURGERY: Chronic | ICD-10-CM

## 2022-07-14 DIAGNOSIS — E66.01 MORBID (SEVERE) OBESITY DUE TO EXCESS CALORIES: ICD-10-CM

## 2022-07-14 PROCEDURE — 88305 TISSUE EXAM BY PATHOLOGIST: CPT | Mod: 26

## 2022-07-14 PROCEDURE — 88305 TISSUE EXAM BY PATHOLOGIST: CPT

## 2022-07-14 PROCEDURE — 43239 EGD BIOPSY SINGLE/MULTIPLE: CPT

## 2022-07-14 RX ORDER — LIDOCAINE HCL 20 MG/ML
8 VIAL (ML) INJECTION ONCE
Refills: 0 | Status: DISCONTINUED | OUTPATIENT
Start: 2022-07-14 | End: 2022-07-28

## 2022-07-14 RX ORDER — SODIUM CHLORIDE 9 MG/ML
500 INJECTION INTRAMUSCULAR; INTRAVENOUS; SUBCUTANEOUS
Refills: 0 | Status: DISCONTINUED | OUTPATIENT
Start: 2022-07-14 | End: 2022-07-28

## 2022-07-14 NOTE — ASU PATIENT PROFILE, ADULT - NSICDXPASTMEDICALHX_GEN_ALL_CORE_FT
PAST MEDICAL HISTORY:  Anemia iron def anemia  iron transfusions started end of feb and transfused x 1 unit 3/5    Exposure to COVID-19 virus 2020    Fibroids     History of termination of pregnancy 2014    Obesity, morbid, BMI 50 or higher     DOTTIE (obstructive sleep apnea) by criteria

## 2022-07-14 NOTE — ASU PATIENT PROFILE, ADULT - FALL HARM RISK - UNIVERSAL INTERVENTIONS
Bed in lowest position, wheels locked, appropriate side rails in place/Call bell, personal items and telephone in reach/Instruct patient to call for assistance before getting out of bed or chair/Non-slip footwear when patient is out of bed/[place] to call system/Physically safe environment - no spills, clutter or unnecessary equipment/Purposeful Proactive Rounding/Room/bathroom lighting operational, light cord in reach

## 2022-07-14 NOTE — ASU DISCHARGE PLAN (ADULT/PEDIATRIC) - NS MD DC FALL RISK RISK
For information on Fall & Injury Prevention, visit: [email or web address] OR  [email or web address] OR  https://www.cdc.gov/steadi/patient.html

## 2022-07-15 LAB — SURGICAL PATHOLOGY STUDY: SIGNIFICANT CHANGE UP

## 2022-07-20 ENCOUNTER — APPOINTMENT (OUTPATIENT)
Dept: PULMONOLOGY | Facility: CLINIC | Age: 32
End: 2022-07-20

## 2022-07-20 VITALS
SYSTOLIC BLOOD PRESSURE: 138 MMHG | BODY MASS INDEX: 51.37 KG/M2 | RESPIRATION RATE: 16 BRPM | WEIGHT: 279.13 LBS | HEIGHT: 62 IN | TEMPERATURE: 97.5 F | OXYGEN SATURATION: 99 % | DIASTOLIC BLOOD PRESSURE: 93 MMHG | HEART RATE: 85 BPM

## 2022-07-20 DIAGNOSIS — R06.83 SNORING: ICD-10-CM

## 2022-07-20 PROCEDURE — 99214 OFFICE O/P EST MOD 30 MIN: CPT | Mod: 25

## 2022-07-20 PROCEDURE — 87635 SARS-COV-2 COVID-19 AMP PRB: CPT

## 2022-07-20 NOTE — ASSESSMENT
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 Rojas for evaluation of possible sleep apnea. The patient has multiple signs and symptoms of sleep-disordered breathing include Loud snoring, nonrestorative sleep, and excessive daytime sleepiness. She was referred to the [facility] for a diagnostic PSG. The ramifications of DOTTIE and its potential therapeutic modalities were discussed with the patient. The patient was cautioned on the importance of avoiding drowsy driving. She will follow up with us after the PSG.\par \par Patient underwent pulmonary function test earlier this year which showed restrictive lung physiology but no decreases in DLCO or spirometry.  She also has no exercise intolerance at this time.  From a pulmonary perspective she could be optimized if she does not have any significant sleep disordered breathing.  Discussed at length of the criteria that need to be met in order to get pulmonary optimization letter for bariatric surgery.  The patient understands and will undergo an in lab polysomnography.

## 2022-07-20 NOTE — ASSESSMENT
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 Rojas for evaluation of possible sleep apnea. The patient has multiple signs and symptoms of sleep-disordered breathing include Loud snoring, nonrestorative sleep, and excessive daytime sleepiness. She was referred to the [facility] for a diagnostic PSG. The ramifications of DOTTIE and its potential therapeutic modalities were discussed with the patient. The patient was cautioned on the importance of avoiding drowsy driving. She will follow up with us after the PSG.\par \par Patient underwent pulmonary function test earlier this year which showed restrictive lung physiology but no decreases in DLCO or spirometry.  She also has no exercise intolerance at this time.  From a pulmonary perspective she could be optimized if she does not have any significant sleep disordered breathing.  Discussed at length of the criteria that need to be met in order to get pulmonary optimization letter for bariatric surgery.  The patient understands and will undergo an in lab polysomnography.

## 2022-07-20 NOTE — HISTORY OF PRESENT ILLNESS
[Snoring] : snoring [Awakes Unrefreshed] : awakening unrefreshed [Awakes with Headache] : headache upon awakening [Recent  Weight Gain] : recent weight gain [To Bed: ___] : ~he/she~ goes to bed at [unfilled] [Arises: ___] : arises at [unfilled] [Sleep Onset Latency: ___ minutes] : sleep onset latency of [unfilled] minutes reported [Nocturnal Awakenings: ___] : ~he/she~ typically has [unfilled] nocturnal awakenings [FreeTextEntry1] : This is a 31-year-old female with significant past medical history of obesity who comes in for an evaluation of preoperative clearance and sleep study for bariatric surgery.\par \par Patient indicates that she has minimal sleep due to a 2-year-old child.  Indicates that her sleep is usually 4 to 5 hours a night.  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  She attempts to take naps during the day if possible which she finds restorative.  There is some snoring but unknown if she has any witnessed apnea.  She does complain of nonrestorative sleep at times and excessive daytime sleepiness.\par \par Patient does indicate that she has some shortness of breath upon ambulation which she attributes to her weight.  She had pulmonary function test done earlier this year which showed restrictive physiology likely secondary to her weight.  She denies any cough or sputum production at this time.  She is a non-smoker and rarely drinks alcohol.\par \par  [Witnessed Apneas] : no witnessed sleep apnea [Frequent Nocturnal Awakening] : no nocturnal awakening [Unintentional Sleep while Active] : no unintentional sleep while active [Unintentional Sleep While Inactive] : no unintentional sleep while inactive [Awakening With Dry Mouth] : no dry mouth upon awakening [DIS] : no DIS [DMS] : no DMS [Unusual Sleep Behavior] : no unusual sleep behavior [Cataplexy] : no cataplexy [Sleep Paralysis] : no sleep paralysis [Hypnagogic Hallucinations] : no hallucinations when falling asleep [Hypnopompic Hallucinations] : no hallucinations when awakening [Lower Extremity Discomfort] : no lower extremity discomfort in evening or at bedtime [ESS] : 10

## 2022-07-20 NOTE — PHYSICAL EXAM
[General Appearance - Well Developed] : well developed [Normal Appearance] : normal appearance [General Appearance - Well Nourished] : well nourished [Enlarged Base of the Tongue] : enlargement of the base of the tongue [IV] : IV [Neck Appearance] : the appearance of the neck was normal [Neck Cervical Mass (___cm)] : no neck mass was observed [Apical Impulse] : the apical impulse was normal [Heart Rate And Rhythm] : heart rate was normal and rhythm regular [Heart Sounds] : normal S1 and S2 [] : no respiratory distress [Respiration, Rhythm And Depth] : normal respiratory rhythm and effort [Auscultation Breath Sounds / Voice Sounds] : lungs were clear to auscultation bilaterally [Nail Clubbing] : no clubbing of the fingernails [Cyanosis, Localized] : no localized cyanosis [Petechial Hemorrhages (___cm)] : no petechial hemorrhages [No Focal Deficits] : no focal deficits [Oriented To Time, Place, And Person] : oriented to person, place, and time [Impaired Insight] : insight and judgment were intact [Affect] : the affect was normal

## 2022-07-27 ENCOUNTER — APPOINTMENT (OUTPATIENT)
Dept: BARIATRICS | Facility: CLINIC | Age: 32
End: 2022-07-27

## 2022-08-13 ENCOUNTER — FORM ENCOUNTER (OUTPATIENT)
Age: 32
End: 2022-08-13

## 2022-08-14 ENCOUNTER — APPOINTMENT (OUTPATIENT)
Dept: SLEEP CENTER | Facility: CLINIC | Age: 32
End: 2022-08-14

## 2022-08-14 ENCOUNTER — OUTPATIENT (OUTPATIENT)
Dept: OUTPATIENT SERVICES | Facility: HOSPITAL | Age: 32
LOS: 1 days | End: 2022-08-14
Payer: COMMERCIAL

## 2022-08-14 DIAGNOSIS — Z98.890 OTHER SPECIFIED POSTPROCEDURAL STATES: Chronic | ICD-10-CM

## 2022-08-14 DIAGNOSIS — Z98.891 HISTORY OF UTERINE SCAR FROM PREVIOUS SURGERY: Chronic | ICD-10-CM

## 2022-08-14 PROCEDURE — 95810 POLYSOM 6/> YRS 4/> PARAM: CPT

## 2022-08-14 PROCEDURE — 95810 POLYSOM 6/> YRS 4/> PARAM: CPT | Mod: 26

## 2022-08-19 NOTE — ASU PREOP CHECKLIST - WEIGHT IN KG
LOV 8/19/2022  
No new care gaps identified.  [facility] Embedded Care Gaps. Reference number: [id number]. 8/19/2022   8:46:31 AM CDT  
127

## 2022-08-24 ENCOUNTER — APPOINTMENT (OUTPATIENT)
Dept: CARDIOLOGY | Facility: CLINIC | Age: 32
End: 2022-08-24

## 2022-08-24 VITALS — DIASTOLIC BLOOD PRESSURE: 90 MMHG | HEART RATE: 91 BPM | SYSTOLIC BLOOD PRESSURE: 142 MMHG

## 2022-08-24 PROCEDURE — 93015 CV STRESS TEST SUPVJ I&R: CPT

## 2022-08-24 PROCEDURE — 99213 OFFICE O/P EST LOW 20 MIN: CPT | Mod: 25

## 2022-08-24 PROCEDURE — ZZZZZ: CPT

## 2022-09-01 ENCOUNTER — APPOINTMENT (OUTPATIENT)
Dept: PULMONOLOGY | Facility: CLINIC | Age: 32
End: 2022-09-01

## 2022-09-01 PROCEDURE — 99213 OFFICE O/P EST LOW 20 MIN: CPT | Mod: 95

## 2022-09-01 NOTE — ASSESSMENT
[FreeTextEntry1] : This is a 31 year old female referred by Dr. Rojas for evaluation of DOTTIE. She was recently diagnosed with severe DOTTIE and she will need this treated before she she can undergo bariatric surgery. Will attempt to expedite her CPAP titration study. Once finished, she will follow up with me and I will attempt to get a PAP device sooner rather than later. \par \par Patient underwent pulmonary function test earlier this year which showed restrictive lung physiology but no decreases in DLCO or spirometry.  She also has no exercise intolerance at this time.  From a pulmonary perspective she could be optimized if she does not have any significant sleep disordered breathing.  Discussed at length of the criteria that need to be met in order to get pulmonary optimization letter for bariatric surgery.  The patient understands and will undergo an in lab polysomnography.

## 2022-09-01 NOTE — HISTORY OF PRESENT ILLNESS
[Home] : at home, [unfilled] , at the time of the visit. [Medical Office: ([facility])___] : at the medical office located in  [Verbal consent obtained from patient] : the patient, [unfilled] [Obstructive Sleep Apnea] : obstructive sleep apnea [Snoring] : snoring [Awakes Unrefreshed] : awakening unrefreshed [Awakes with Headache] : headache upon awakening [Recent  Weight Gain] : recent weight gain [To Bed: ___] : ~he/she~ goes to bed at [unfilled] [Arises: ___] : arises at [unfilled] [Sleep Onset Latency: ___ minutes] : sleep onset latency of [unfilled] minutes reported [Nocturnal Awakenings: ___] : ~he/she~ typically has [unfilled] nocturnal awakenings [Date: ___] : Date of most recent diagnostic polysomnogram: [unfilled] [AHI: ___ per hour] : Apnea-hypopnea index:  [unfilled] per hour [T90%: ___] : T90%: [unfilled]% [Gunner desatuation%: ___] : Gunner desaturation:  [unfilled]% [FreeTextEntry1] : \par \par This is a 31 year year old female who comes in for a follow up. She recently underwent a sleep study and is here to review the results. She is also hear to discuss next steps. There have been no significant changes to her medical history since we last spoke. Still plannnig on going through her bariatic surgery. \par \par Of note:\par Patient indicates that she has minimal sleep due to a 2-year-old child.  Indicates that her sleep is usually 4 to 5 hours a night.  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  She attempts to take naps during the day if possible which she finds restorative.  There is some snoring but unknown if she has any witnessed apnea.  She does complain of nonrestorative sleep at times and excessive daytime sleepiness.\par \par Patient does indicate that she has some shortness of breath upon ambulation which she attributes to her weight.  She had pulmonary function test done earlier this year which showed restrictive physiology likely secondary to her weight.  She denies any cough or sputum production at this time.  She is a non-smoker and rarely drinks alcohol.\par \par  [Witnessed Apneas] : no witnessed sleep apnea [Frequent Nocturnal Awakening] : no nocturnal awakening [Unintentional Sleep while Active] : no unintentional sleep while active [Unintentional Sleep While Inactive] : no unintentional sleep while inactive [Awakening With Dry Mouth] : no dry mouth upon awakening [DIS] : no DIS [DMS] : no DMS [Unusual Sleep Behavior] : no unusual sleep behavior [Cataplexy] : no cataplexy [Sleep Paralysis] : no sleep paralysis [Hypnagogic Hallucinations] : no hallucinations when falling asleep [Hypnopompic Hallucinations] : no hallucinations when awakening [Lower Extremity Discomfort] : no lower extremity discomfort in evening or at bedtime [ESS] : 10

## 2022-09-02 ENCOUNTER — LABORATORY RESULT (OUTPATIENT)
Age: 32
End: 2022-09-02

## 2022-09-08 ENCOUNTER — APPOINTMENT (OUTPATIENT)
Dept: PULMONOLOGY | Facility: CLINIC | Age: 32
End: 2022-09-08

## 2022-09-08 VITALS
TEMPERATURE: 98 F | DIASTOLIC BLOOD PRESSURE: 103 MMHG | SYSTOLIC BLOOD PRESSURE: 167 MMHG | BODY MASS INDEX: 53.37 KG/M2 | WEIGHT: 290 LBS | HEART RATE: 94 BPM | OXYGEN SATURATION: 99 % | HEIGHT: 62 IN

## 2022-09-08 PROCEDURE — 99214 OFFICE O/P EST MOD 30 MIN: CPT

## 2022-09-08 NOTE — DISCUSSION/SUMMARY
[FreeTextEntry1] : 31F presents for follow up.\par \par 1. CP\par -resolved\par \par 2. plan for bariatric surgery\par -pt feeling well\par -ekg reviewed, SR\par -recent tte reviewed, no severe valvular lesions\par -check exercise stress\par -further pre-operative recs to be given after exercise stress\par \par \par \par Addendum 9/8/22:\par pt completed an echocardiogram that showed preserved LV and RV function without severe valvular lesions\par pt also completed a exercise stress test without evidence of ischemia. \par pt is low risk for a intermediate risk procedure and is optimized from a CV perspective and may proceed without further cardiac testing.

## 2022-09-08 NOTE — DISCUSSION/SUMMARY
[FreeTextEntry1] : 31 year old patient presented for evaluation of shortness of breath \par \par She is undergoing preoperative pulmonary evaluation for planned bariatric surgery. \par \par She has been started on albuterol MDI and used it initially but has not needed to use it since then.  She is improved. She denies wheeze\par \par She had COVID infection 12/2020\par \par She has been diagnosed with with obstructive sleep apnea and will undergo titration\par \par She had  history of preeclampsia\par \par She has anxiety\par \par She also reports palpitations.   she is undergoing cardiologic evaluation and did have echo, which was normal.  She has had Holter monitor, results pending\par \par She has low HDL and low Vit D\par \par Prior PFT demonstrated  very mild restriction, no obstruction.  DLCO is normal\par \par Exercise oximetry on prior visit demonstrated no oxygen desaturation though there was tachycardia\par \par \par PLAN\par \par CXR reordered\par \par She will undergo follow up overnight sleep study for CPAP titration\par \par Continue preop evaluation/optimization prior to planned bariatric surgery\par \par She may use  albuterol inhaler to use as needed as empiric trial\par \par \par Total time spent : 30 minutes\par Including:\par Preparation prior to visit - Reviewing prior record, results of tests and Consultation Reports as applicable\par Conducting an appropriate H & P during today's encounter\par Appropriate orders for tests, medications and procedures, as applicable\par Counseling patient \par Note completion \par Larry Laboy MD \par

## 2022-09-08 NOTE — REASON FOR VISIT
[Shortness of Breath] : shortness of breath [Pre-op Risk Stratification] : pre-op risk stratification

## 2022-09-08 NOTE — REVIEW OF SYSTEMS
[Dyspnea on exertion] : dyspnea during exertion [Fever] : no fever [Headache] : no headache [Weight Gain (___ Lbs)] : no recent weight gain [Chills] : no chills [Feeling Fatigued] : not feeling fatigued [Weight Loss (___ Lbs)] : no recent weight loss [Blurry Vision] : no blurred vision [SOB] : no shortness of breath [Chest Discomfort] : no chest discomfort [Lower Ext Edema] : no extremity edema [Palpitations] : no palpitations [Orthopnea] : no orthopnea [Syncope] : no syncope [Cough] : no cough [Wheezing] : no wheezing [Nausea] : no nausea [Vomiting] : no vomiting [Dizziness] : no dizziness [Confusion] : no confusion was observed [Easy Bleeding] : no tendency for easy bleeding [Easy Bruising] : no tendency for easy bruising

## 2022-09-08 NOTE — HISTORY OF PRESENT ILLNESS
[FreeTextEntry1] : 30F presents for f/u.\par PMD: Dr al Larson\par  \par Previously,\par pt last seen in cardiology for an initial eval 11/21 for post-covid symptoms of CP. worse with anxiety.  TTE grossly unremarkable.  extended holter also grossly unremarkable. \par \par pt now presents for follow up. \par today,\par \par pt overall feeling well. anxious to be here today. pt denies cp at rest or on exertion. states she might have sob when doing heavy exertion. sates this has been ongoing since covid.\par \par \par pt planning on having bariatric sx, no date set. Dr Batista. \par \par Pt able to ambulate > 1 block without cp or sob (>4 mets)\par Pt grossly euvolemic on exam, denies LE edema, orthopnea. \par No severe valvular lesions\par No known hx of VT/VF/SCD\par \par \par \par  \par Exercise: none\par Diet: none\par \par Prior cardiac workup: none\par Recent labs: 10/21: hgb 12.3 tot chol 173 tg 146 hdl 37 ldl 136 Cr 0.88 gfr 87 tsh 1.12 a1c 6.0\par  \par EKG: SR 96\par \par  \par Med hx: COVID 12/2020 BMI 50\par OBGYN hx: 2 kids, + hx of preeclampsia,+ gest dm, Currently with regular menstrual cycles. No hx of miscarriage. \par Sx hx: c/s x2\par Fam hx:no known cardiac hx\par Social hx: lives in [place] with boyfriend and kids. works in [employer]. no tob, social etoh, no drugs\par Meds: none\par Allergies: nkda\par

## 2022-09-08 NOTE — HISTORY OF PRESENT ILLNESS
[Daytime Somnolence] : daytime somnolence [Snoring] : snoring [TextBox_4] : 31 year old patient presented for evaluation of shortness of breath \par \par The problem started 6-7 months prior.  She had infection with COVID and had dyspnea and malaise.  she was not hospitalized and did not take specific medications \par \par The shortness of breath wasworse with exertion, worse with talking. She reported slight wheeze. She denies chest pain or tightness. She notes tachycardia and tachypnea.  No cough or sputum. \par \par She has no active pulmonary problems.  She denies history of COPD, asthma, pneumonia, chest pain, postnasal drip,  sinusitis, wheeze, cough, sputum production or shortness of breath. \par \par She also  described palpitations. She has had cardiology evaluation. \par \par She has had anxiety, which has worsened.  She states that that she has gained 50 pounds.  She had her daughter July 2020.  She had preeclampsia.\par \par She has no pedal edema. \par She reports orthopnea, developing dyspnea.\par \par She has been told of loud snoring. \par \par \par She was prescribed albuterol MDI which she used several times.  She states that dyspnea and palpitations have greatly improved.  she has not needed to use inhaled bronchodilator \par \par She underwent overnight sleep study that diagnosed obstructive sleep apnea .  CPAP titration is pending.\par \par She has not had CXR.\par \par Primary doctor is Dr Larson\par \par \par PSH:\par \par c section\par \par \par PMH:\par \par elevated BP\par \par elevated BMI\par \par \par SH:\par \par never smoker\par \par \par ETOH:  occasional\par \par \par Occupation: works as \par \par No exposure to chemicals, dust, asbestos, mold\par \par ALLERGY:\par \par NKDA\par \par \par environmental/seasonal allergy: mild to ? grass, cut grass\par \par \par \par Review of Systems:\par \par psoriasis\par No dry eyes\par no mouth ulcers\par no sinusitis, sinus infections, nasal obstruction\par no dysphagia\par no dry mouth\par \par no arthritis\par no joint aches\par no joint swelling\par \par \par no pneumonia\par no wheeze\par no lung cancer\par \par no CAD\par no MI\par no chest pain\par no murmur\par no CHF\par \par no edema\par \par no peptic ulcer or gastritis\par no GERD\par no abdominal pain\par no liver disease\par \par no Diabetes\par no thyroid disease\par no hyperlipidemia\par \par \par no bleeding\par \par no DVT or PE\par \par no kidney disease\par \par no stroke\par no seizure\par \par \par \par \par \par \par \par \par \par \par \par   [Hypersomnolence] : denies hypersomnolence

## 2022-09-11 ENCOUNTER — OUTPATIENT (OUTPATIENT)
Dept: OUTPATIENT SERVICES | Facility: HOSPITAL | Age: 32
LOS: 1 days | End: 2022-09-11
Payer: COMMERCIAL

## 2022-09-11 ENCOUNTER — APPOINTMENT (OUTPATIENT)
Dept: SLEEP CENTER | Facility: CLINIC | Age: 32
End: 2022-09-11

## 2022-09-11 DIAGNOSIS — Z98.891 HISTORY OF UTERINE SCAR FROM PREVIOUS SURGERY: Chronic | ICD-10-CM

## 2022-09-11 DIAGNOSIS — Z98.890 OTHER SPECIFIED POSTPROCEDURAL STATES: Chronic | ICD-10-CM

## 2022-09-11 PROCEDURE — 95811 POLYSOM 6/>YRS CPAP 4/> PARM: CPT | Mod: 26

## 2022-09-11 PROCEDURE — 95811 POLYSOM 6/>YRS CPAP 4/> PARM: CPT

## 2022-09-22 DIAGNOSIS — G47.33 OBSTRUCTIVE SLEEP APNEA (ADULT) (PEDIATRIC): ICD-10-CM

## 2022-09-23 ENCOUNTER — APPOINTMENT (OUTPATIENT)
Dept: PULMONOLOGY | Facility: CLINIC | Age: 32
End: 2022-09-23

## 2022-09-23 PROCEDURE — 99213 OFFICE O/P EST LOW 20 MIN: CPT | Mod: 95

## 2022-09-23 NOTE — ASSESSMENT
[FreeTextEntry1] : This is a 31 year old female referred by Dr. Rojas for evaluation of DOTTIE. She was recently diagnosed with severe DOTTIE and recently underwent PAP titration study. She was found to have an optimal CPAP pressure of 9 cmH2O. Will order this and attempt to expedite her delivery of CPAP. She understands she needs to see me roughly 2 weeks after she receives the machine so that I can determine appropriate use and improvement. \par \par Patient underwent pulmonary function test earlier this year which showed restrictive lung physiology but no decreases in DLCO or spirometry.  She also has no exercise intolerance at this time.  From a pulmonary perspective she could be optimized 2 weeks after she receives her PAP device. \par

## 2022-09-23 NOTE — HISTORY OF PRESENT ILLNESS
[Obstructive Sleep Apnea] : obstructive sleep apnea [Snoring] : snoring [Awakes Unrefreshed] : awakening unrefreshed [Awakes with Headache] : headache upon awakening [Recent  Weight Gain] : recent weight gain [To Bed: ___] : ~he/she~ goes to bed at [unfilled] [Arises: ___] : arises at [unfilled] [Sleep Onset Latency: ___ minutes] : sleep onset latency of [unfilled] minutes reported [Nocturnal Awakenings: ___] : ~he/she~ typically has [unfilled] nocturnal awakenings [Date: ___] : Date of most recent diagnostic polysomnogram: [unfilled] [AHI: ___ per hour] : Apnea-hypopnea index:  [unfilled] per hour [T90%: ___] : T90%: [unfilled]% [Gunner desatuation%: ___] : Gunner desaturation:  [unfilled]% [Home] : at home, [unfilled] , at the time of the visit. [Medical Office: ([facility])___] : at the medical office located in  [Verbal consent obtained from patient] : the patient, [unfilled] [FreeTextEntry1] : \par \par This is a 31 year year old female who comes in for a follow up. She recently underwent a PAP titration  study and is here to review the results. She is also hear to discuss next steps. There have been no significant changes to her medical history since we last spoke. Still planning on going through her bariatric surgery. \par \par Of note:\par Patient indicates that she has minimal sleep due to a 2-year-old child.  Indicates that her sleep is usually 4 to 5 hours a night.  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  She attempts to take naps during the day if possible which she finds restorative.  There is some snoring but unknown if she has any witnessed apnea.  She does complain of nonrestorative sleep at times and excessive daytime sleepiness.\par \par Patient does indicate that she has some shortness of breath upon ambulation which she attributes to her weight.  She had pulmonary function test done earlier this year which showed restrictive physiology likely secondary to her weight.  She denies any cough or sputum production at this time.  She is a non-smoker and rarely drinks alcohol.\par \par  [Witnessed Apneas] : no witnessed sleep apnea [Frequent Nocturnal Awakening] : no nocturnal awakening [Unintentional Sleep while Active] : no unintentional sleep while active [Unintentional Sleep While Inactive] : no unintentional sleep while inactive [Awakening With Dry Mouth] : no dry mouth upon awakening [DIS] : no DIS [DMS] : no DMS [Unusual Sleep Behavior] : no unusual sleep behavior [Cataplexy] : no cataplexy [Sleep Paralysis] : no sleep paralysis [Hypnagogic Hallucinations] : no hallucinations when falling asleep [Hypnopompic Hallucinations] : no hallucinations when awakening [Lower Extremity Discomfort] : no lower extremity discomfort in evening or at bedtime [ESS] : 10

## 2022-09-27 ENCOUNTER — NON-APPOINTMENT (OUTPATIENT)
Age: 32
End: 2022-09-27

## 2022-09-27 DIAGNOSIS — Z97.5 PRESENCE OF (INTRAUTERINE) CONTRACEPTIVE DEVICE: ICD-10-CM

## 2022-10-04 NOTE — PRE PROCEDURE NOTE - PRE PROCEDURE EVALUATION
Attending Physician:   Sania Carty DO                  Procedure: EGD    Indication for Procedure: pre-operative assessment for bariatric surgery, GERD  ________________________________________________________  PAST MEDICAL & SURGICAL HISTORY:  Anemia  iron def anemia  iron transfusions started end of feb and transfused x 1 unit 3/5    History of termination of pregnancy      Fibroids    Exposure to COVID-19 virus      Obesity, morbid, BMI 50 or higher    DOTTIE (obstructive sleep apnea)  by criteria    H/O:  section   m failure to dilate 8lbs    History of dilation and curettage        ALLERGIES:  No Known Allergies    HOME MEDICATIONS:  no meds:     AICD/PPM: [ ] yes   [x ] no    PERTINENT LAB DATA:                      PHYSICAL EXAMINATION:    Height (cm): 157.5  Weight (kg): 127  BMI (kg/m2): 51.2  BSA (m2): 2.21T(C): 36.7  HR: 91  BP: 156/103  RR: 15  SpO2: 98%    Constitutional: NAD    Neck:  No JVD  Respiratory: CTAB/L  Cardiovascular: S1 and S2  Gastrointestinal: BS+, soft, NT/ND  Extremities: No peripheral edema  Neurological: A/O x 3, no focal deficits        COMMENTS:    The patient is a suitable candidate for the planned procedure unless box checked [ ]  No, explain:     20

## 2022-10-12 ENCOUNTER — APPOINTMENT (OUTPATIENT)
Dept: BARIATRICS/WEIGHT MGMT | Facility: CLINIC | Age: 32
End: 2022-10-12

## 2022-10-12 DIAGNOSIS — G47.33 OBSTRUCTIVE SLEEP APNEA (ADULT) (PEDIATRIC): ICD-10-CM

## 2022-10-12 PROCEDURE — 90791 PSYCH DIAGNOSTIC EVALUATION: CPT | Mod: 95

## 2022-11-03 ENCOUNTER — NON-APPOINTMENT (OUTPATIENT)
Age: 32
End: 2022-11-03

## 2022-11-17 ENCOUNTER — APPOINTMENT (OUTPATIENT)
Dept: BARIATRICS | Facility: CLINIC | Age: 32
End: 2022-11-17

## 2022-11-17 VITALS
HEART RATE: 94 BPM | DIASTOLIC BLOOD PRESSURE: 90 MMHG | HEIGHT: 62 IN | WEIGHT: 289 LBS | OXYGEN SATURATION: 98 % | TEMPERATURE: 97 F | BODY MASS INDEX: 53.18 KG/M2 | SYSTOLIC BLOOD PRESSURE: 140 MMHG

## 2022-11-17 PROCEDURE — 99214 OFFICE O/P EST MOD 30 MIN: CPT

## 2022-12-06 ENCOUNTER — APPOINTMENT (OUTPATIENT)
Dept: PULMONOLOGY | Facility: CLINIC | Age: 32
End: 2022-12-06

## 2022-12-06 PROCEDURE — 99213 OFFICE O/P EST LOW 20 MIN: CPT | Mod: 95

## 2022-12-08 ENCOUNTER — OUTPATIENT (OUTPATIENT)
Dept: OUTPATIENT SERVICES | Facility: HOSPITAL | Age: 32
LOS: 1 days | End: 2022-12-08
Payer: COMMERCIAL

## 2022-12-08 VITALS
HEART RATE: 70 BPM | TEMPERATURE: 98 F | HEIGHT: 62 IN | SYSTOLIC BLOOD PRESSURE: 120 MMHG | DIASTOLIC BLOOD PRESSURE: 70 MMHG | WEIGHT: 289.91 LBS | OXYGEN SATURATION: 99 % | RESPIRATION RATE: 14 BRPM

## 2022-12-08 DIAGNOSIS — Z01.818 ENCOUNTER FOR OTHER PREPROCEDURAL EXAMINATION: ICD-10-CM

## 2022-12-08 DIAGNOSIS — Z98.890 OTHER SPECIFIED POSTPROCEDURAL STATES: Chronic | ICD-10-CM

## 2022-12-08 DIAGNOSIS — E66.01 MORBID (SEVERE) OBESITY DUE TO EXCESS CALORIES: ICD-10-CM

## 2022-12-08 DIAGNOSIS — Z98.891 HISTORY OF UTERINE SCAR FROM PREVIOUS SURGERY: Chronic | ICD-10-CM

## 2022-12-08 LAB
ANION GAP SERPL CALC-SCNC: 10 MMOL/L — SIGNIFICANT CHANGE UP (ref 5–17)
BLD GP AB SCN SERPL QL: SIGNIFICANT CHANGE UP
BUN SERPL-MCNC: 9 MG/DL — SIGNIFICANT CHANGE UP (ref 7–23)
CALCIUM SERPL-MCNC: 9 MG/DL — SIGNIFICANT CHANGE UP (ref 8.4–10.5)
CHLORIDE SERPL-SCNC: 102 MMOL/L — SIGNIFICANT CHANGE UP (ref 96–108)
CO2 SERPL-SCNC: 26 MMOL/L — SIGNIFICANT CHANGE UP (ref 22–31)
CREAT SERPL-MCNC: 0.73 MG/DL — SIGNIFICANT CHANGE UP (ref 0.5–1.3)
EGFR: 111 ML/MIN/1.73M2 — SIGNIFICANT CHANGE UP
GLUCOSE SERPL-MCNC: 99 MG/DL — SIGNIFICANT CHANGE UP (ref 70–99)
HCT VFR BLD CALC: 40.3 % — SIGNIFICANT CHANGE UP (ref 34.5–45)
HGB BLD-MCNC: 13.5 G/DL — SIGNIFICANT CHANGE UP (ref 11.5–15.5)
MCHC RBC-ENTMCNC: 29.1 PG — SIGNIFICANT CHANGE UP (ref 27–34)
MCHC RBC-ENTMCNC: 33.5 GM/DL — SIGNIFICANT CHANGE UP (ref 32–36)
MCV RBC AUTO: 86.9 FL — SIGNIFICANT CHANGE UP (ref 80–100)
NRBC # BLD: 0 /100 WBCS — SIGNIFICANT CHANGE UP (ref 0–0)
PLATELET # BLD AUTO: 369 K/UL — SIGNIFICANT CHANGE UP (ref 150–400)
POTASSIUM SERPL-MCNC: 3.8 MMOL/L — SIGNIFICANT CHANGE UP (ref 3.5–5.3)
POTASSIUM SERPL-SCNC: 3.8 MMOL/L — SIGNIFICANT CHANGE UP (ref 3.5–5.3)
RBC # BLD: 4.64 M/UL — SIGNIFICANT CHANGE UP (ref 3.8–5.2)
RBC # FLD: 12.9 % — SIGNIFICANT CHANGE UP (ref 10.3–14.5)
SODIUM SERPL-SCNC: 138 MMOL/L — SIGNIFICANT CHANGE UP (ref 135–145)
WBC # BLD: 7.79 K/UL — SIGNIFICANT CHANGE UP (ref 3.8–10.5)
WBC # FLD AUTO: 7.79 K/UL — SIGNIFICANT CHANGE UP (ref 3.8–10.5)

## 2022-12-08 PROCEDURE — G0463: CPT

## 2022-12-08 NOTE — H&P PST ADULT - NSICDXFAMILYHX_GEN_ALL_CORE_FT
FAMILY HISTORY:  Mother  Still living? Yes, Estimated age: Age Unknown  Family history of breast cancer in mother, Age at diagnosis: Age Unknown

## 2022-12-08 NOTE — H&P PST ADULT - HISTORY OF PRESENT ILLNESS
30 y/o morbidly obese woman, BMI of 51.2, DOTTIE by criteria,presents for pre op eval for bariatric surgery  She  32 y/o morbidly obese woman, BMI of 53 DOTTIE ,presents for pre op eval for bariatric surgery  She has struggle to control her weight and tried all diets, nutritional counseling and exercise without any success to maintain weight loss.  scheduled for laparoscopic sleeve gastrectomy on 12/29/22

## 2022-12-08 NOTE — H&P PST ADULT - NSICDXPASTMEDICALHX_GEN_ALL_CORE_FT
PAST MEDICAL HISTORY:  Anemia     Exposure to COVID-19 virus 2020    Fibroids     Obesity, morbid, BMI 50 or higher     DOTTIE on CPAP

## 2022-12-08 NOTE — H&P PST ADULT - NEGATIVE GENERAL GENITOURINARY SYMPTOMS
no hematuria/no renal colic/no flank pain R/no urine discoloration/no bladder infections/no incontinence

## 2022-12-08 NOTE — H&P PST ADULT - ASSESSMENT
31 y/o female with morbid obesity       plan; scheduled for laparoscopic sleeve gastrectomy on 12/9/22  will obtain medical clearance   Pre op instructions   COVID test on 12/27/22 at 1;30 pm   [facility] on admit

## 2022-12-09 ENCOUNTER — APPOINTMENT (OUTPATIENT)
Dept: INTERNAL MEDICINE | Facility: CLINIC | Age: 32
End: 2022-12-09

## 2022-12-09 VITALS
RESPIRATION RATE: 16 BRPM | HEART RATE: 84 BPM | SYSTOLIC BLOOD PRESSURE: 148 MMHG | DIASTOLIC BLOOD PRESSURE: 93 MMHG | HEIGHT: 62 IN | OXYGEN SATURATION: 99 %

## 2022-12-09 DIAGNOSIS — Z13.228 ENCOUNTER FOR SCREENING FOR OTHER METABOLIC DISORDERS: ICD-10-CM

## 2022-12-09 DIAGNOSIS — Z87.898 PERSONAL HISTORY OF OTHER SPECIFIED CONDITIONS: ICD-10-CM

## 2022-12-09 DIAGNOSIS — E66.01 MORBID (SEVERE) OBESITY DUE TO EXCESS CALORIES: ICD-10-CM

## 2022-12-09 DIAGNOSIS — R03.0 ELEVATED BLOOD-PRESSURE READING, W/OUT DIAGNOSIS OF HYPERTENSION: ICD-10-CM

## 2022-12-09 DIAGNOSIS — R53.81 OTHER MALAISE: ICD-10-CM

## 2022-12-09 DIAGNOSIS — Z01.818 ENCOUNTER FOR OTHER PREPROCEDURAL EXAMINATION: ICD-10-CM

## 2022-12-09 DIAGNOSIS — R53.83 OTHER MALAISE: ICD-10-CM

## 2022-12-09 PROCEDURE — 99214 OFFICE O/P EST MOD 30 MIN: CPT

## 2022-12-09 RX ORDER — CEFAZOLIN SODIUM 1 G
3000 VIAL (EA) INJECTION ONCE
Refills: 0 | Status: DISCONTINUED | OUTPATIENT
Start: 2022-12-29 | End: 2022-12-30

## 2022-12-09 RX ORDER — HYDROMORPHONE HYDROCHLORIDE 2 MG/ML
0.5 INJECTION INTRAMUSCULAR; INTRAVENOUS; SUBCUTANEOUS EVERY 4 HOURS
Refills: 0 | Status: DISCONTINUED | OUTPATIENT
Start: 2022-12-29 | End: 2022-12-30

## 2022-12-09 RX ORDER — ENOXAPARIN SODIUM 100 MG/ML
40 INJECTION SUBCUTANEOUS EVERY 24 HOURS
Refills: 0 | Status: DISCONTINUED | OUTPATIENT
Start: 2022-12-30 | End: 2022-12-30

## 2022-12-09 RX ORDER — SODIUM CHLORIDE 9 MG/ML
1000 INJECTION, SOLUTION INTRAVENOUS
Refills: 0 | Status: DISCONTINUED | OUTPATIENT
Start: 2022-12-29 | End: 2022-12-30

## 2022-12-09 RX ORDER — HYOSCYAMINE SULFATE 0.13 MG
0.12 TABLET ORAL EVERY 6 HOURS
Refills: 0 | Status: DISCONTINUED | OUTPATIENT
Start: 2022-12-29 | End: 2022-12-30

## 2022-12-09 RX ORDER — ACETAMINOPHEN 500 MG
1000 TABLET ORAL ONCE
Refills: 0 | Status: DISCONTINUED | OUTPATIENT
Start: 2022-12-29 | End: 2022-12-30

## 2022-12-09 RX ORDER — PANTOPRAZOLE SODIUM 20 MG/1
40 TABLET, DELAYED RELEASE ORAL DAILY
Refills: 0 | Status: DISCONTINUED | OUTPATIENT
Start: 2022-12-29 | End: 2022-12-30

## 2022-12-09 RX ORDER — OXYCODONE HYDROCHLORIDE 5 MG/1
5 TABLET ORAL EVERY 6 HOURS
Refills: 0 | Status: DISCONTINUED | OUTPATIENT
Start: 2022-12-30 | End: 2022-12-30

## 2022-12-09 RX ORDER — ONDANSETRON 8 MG/1
4 TABLET, FILM COATED ORAL EVERY 6 HOURS
Refills: 0 | Status: DISCONTINUED | OUTPATIENT
Start: 2022-12-29 | End: 2022-12-30

## 2022-12-09 RX ORDER — SODIUM CHLORIDE 9 MG/ML
2000 INJECTION, SOLUTION INTRAVENOUS
Refills: 0 | Status: DISCONTINUED | OUTPATIENT
Start: 2022-12-29 | End: 2022-12-30

## 2022-12-09 RX ORDER — SIMETHICONE 80 MG/1
80 TABLET, CHEWABLE ORAL EVERY 8 HOURS
Refills: 0 | Status: DISCONTINUED | OUTPATIENT
Start: 2022-12-29 | End: 2022-12-30

## 2022-12-09 NOTE — HISTORY OF PRESENT ILLNESS
[No Pertinent Cardiac History] : no history of aortic stenosis, atrial fibrillation, coronary artery disease, recent myocardial infarction, or implantable device/pacemaker [Asthma] : no asthma [COPD] : no COPD [Sleep Apnea] : sleep apnea [Smoker] : not a smoker [No Adverse Anesthesia Reaction] : no adverse anesthesia reaction in self or family member [Chronic Anticoagulation] : no chronic anticoagulation [Chronic Kidney Disease] : no chronic kidney disease [Diabetes] : no diabetes [(Patient denies any chest pain, claudication, dyspnea on exertion, orthopnea, palpitations or syncope)] : Patient denies any chest pain, claudication, dyspnea on exertion, orthopnea, palpitations or syncope [Good (7-10 METs)] : Good (7-10 METs) [FreeTextEntry1] : Gastric Sleeve  [FreeTextEntry2] : 12/29/22 [FreeTextEntry3] : Dr. Chong Rojas  [FreeTextEntry4] : 32 y/o female patient with PMH:Pre-diabetes/DOTTIE/anxiety/anemia presents today:\par - pre-operative clearance - patient with morbid obesity BMI >50 to undergo gastric sleeve by Bariatric surgeon Dr. Rojas on 12/29 at [facility]. \par - patient has been medically optimized by cardiology and pulmonology

## 2022-12-11 PROBLEM — Z97.5 IUD CONTRACEPTION: Status: ACTIVE | Noted: 2022-12-11

## 2022-12-12 ENCOUNTER — NON-APPOINTMENT (OUTPATIENT)
Age: 32
End: 2022-12-12

## 2022-12-12 NOTE — HISTORY OF PRESENT ILLNESS
[Home] : at home, [unfilled] , at the time of the visit. [Medical Office: ([facility])___] : at the medical office located in  [Verbal consent obtained from patient] : the patient, [unfilled] [Obstructive Sleep Apnea] : obstructive sleep apnea [Snoring] : snoring [Awakes Unrefreshed] : awakening unrefreshed [Awakes with Headache] : headache upon awakening [Recent  Weight Gain] : recent weight gain [To Bed: ___] : ~he/she~ goes to bed at [unfilled] [Arises: ___] : arises at [unfilled] [Sleep Onset Latency: ___ minutes] : sleep onset latency of [unfilled] minutes reported [Nocturnal Awakenings: ___] : ~he/she~ typically has [unfilled] nocturnal awakenings [Date: ___] : Date of most recent diagnostic polysomnogram: [unfilled] [AHI: ___ per hour] : Apnea-hypopnea index:  [unfilled] per hour [T90%: ___] : T90%: [unfilled]% [Gunner desatuation%: ___] : Gunner desaturation:  [unfilled]% [FreeTextEntry1] : \par \par This is a 31 year year old female who comes in for a follow up.  She has received her CPAP machine but is having difficulty using it.  However, when she does use it her AHI is normal.\par \par Of note:\par Patient indicates that she has minimal sleep due to a 2-year-old child.  Indicates that her sleep is usually 4 to 5 hours a night.  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  She attempts to take naps during the day if possible which she finds restorative.  There is some snoring but unknown if she has any witnessed apnea.  She does complain of nonrestorative sleep at times and excessive daytime sleepiness.\par \par Patient does indicate that she has some shortness of breath upon ambulation which she attributes to her weight.  She had pulmonary function test done earlier this year which showed restrictive physiology likely secondary to her weight.  She denies any cough or sputum production at this time.  She is a non-smoker and rarely drinks alcohol.\par \par  [Witnessed Apneas] : no witnessed sleep apnea [Frequent Nocturnal Awakening] : no nocturnal awakening [Unintentional Sleep while Active] : no unintentional sleep while active [Unintentional Sleep While Inactive] : no unintentional sleep while inactive [Awakening With Dry Mouth] : no dry mouth upon awakening [DIS] : no DIS [DMS] : no DMS [Unusual Sleep Behavior] : no unusual sleep behavior [Cataplexy] : no cataplexy [Sleep Paralysis] : no sleep paralysis [Hypnagogic Hallucinations] : no hallucinations when falling asleep [Hypnopompic Hallucinations] : no hallucinations when awakening [Lower Extremity Discomfort] : no lower extremity discomfort in evening or at bedtime [ESS] : 10

## 2022-12-12 NOTE — ASSESSMENT
[FreeTextEntry1] : This is a 31 year old female referred by Dr. Rojas for evaluation of DOTTIE. She was recently diagnosed with severe DOTTIE and recently underwent PAP titration study. She was found to have an optimal CPAP pressure of 9 cmH2O. she is having difficulty using the device but when she does use it her AHI is normal.  For now we will send letter of optimization to the surgeon.\par \par Patient underwent pulmonary function test earlier this year which showed restrictive lung physiology but no decreases in DLCO or spirometry.  She also has no exercise intolerance at this time.

## 2022-12-14 RX ORDER — ENOXAPARIN SODIUM 40 MG/.4ML
40 INJECTION, SOLUTION SUBCUTANEOUS DAILY
Qty: 14 | Refills: 0 | Status: DISCONTINUED | COMMUNITY
Start: 2022-12-11 | End: 2022-12-14

## 2022-12-28 ENCOUNTER — OUTPATIENT (OUTPATIENT)
Dept: OUTPATIENT SERVICES | Facility: HOSPITAL | Age: 32
LOS: 1 days | End: 2022-12-28
Payer: COMMERCIAL

## 2022-12-28 ENCOUNTER — TRANSCRIPTION ENCOUNTER (OUTPATIENT)
Age: 32
End: 2022-12-28

## 2022-12-28 DIAGNOSIS — Z98.890 OTHER SPECIFIED POSTPROCEDURAL STATES: Chronic | ICD-10-CM

## 2022-12-28 DIAGNOSIS — Z98.891 HISTORY OF UTERINE SCAR FROM PREVIOUS SURGERY: Chronic | ICD-10-CM

## 2022-12-28 DIAGNOSIS — Z20.828 CONTACT WITH AND (SUSPECTED) EXPOSURE TO OTHER VIRAL COMMUNICABLE DISEASES: ICD-10-CM

## 2022-12-28 PROBLEM — D64.9 ANEMIA, UNSPECIFIED: Chronic | Status: ACTIVE | Noted: 2022-12-08

## 2022-12-28 PROBLEM — G47.33 OBSTRUCTIVE SLEEP APNEA (ADULT) (PEDIATRIC): Chronic | Status: ACTIVE | Noted: 2022-12-08

## 2022-12-28 LAB — SARS-COV-2 RNA SPEC QL NAA+PROBE: SIGNIFICANT CHANGE UP

## 2022-12-28 PROCEDURE — 87635 SARS-COV-2 COVID-19 AMP PRB: CPT

## 2022-12-29 ENCOUNTER — APPOINTMENT (OUTPATIENT)
Dept: BARIATRICS | Facility: HOSPITAL | Age: 32
End: 2022-12-29

## 2022-12-29 ENCOUNTER — TRANSCRIPTION ENCOUNTER (OUTPATIENT)
Age: 32
End: 2022-12-29

## 2022-12-29 ENCOUNTER — INPATIENT (INPATIENT)
Facility: HOSPITAL | Age: 32
LOS: 0 days | Discharge: HOME | DRG: 621 | End: 2022-12-30
Attending: SURGERY | Admitting: SURGERY
Payer: COMMERCIAL

## 2022-12-29 ENCOUNTER — RESULT REVIEW (OUTPATIENT)
Age: 32
End: 2022-12-29

## 2022-12-29 VITALS
RESPIRATION RATE: 16 BRPM | OXYGEN SATURATION: 100 % | DIASTOLIC BLOOD PRESSURE: 75 MMHG | SYSTOLIC BLOOD PRESSURE: 133 MMHG | HEART RATE: 88 BPM | TEMPERATURE: 98 F | WEIGHT: 284.62 LBS | HEIGHT: 62 IN

## 2022-12-29 DIAGNOSIS — Z98.891 HISTORY OF UTERINE SCAR FROM PREVIOUS SURGERY: Chronic | ICD-10-CM

## 2022-12-29 DIAGNOSIS — E66.01 MORBID (SEVERE) OBESITY DUE TO EXCESS CALORIES: ICD-10-CM

## 2022-12-29 DIAGNOSIS — G47.33 OBSTRUCTIVE SLEEP APNEA (ADULT) (PEDIATRIC): ICD-10-CM

## 2022-12-29 DIAGNOSIS — Z98.890 OTHER SPECIFIED POSTPROCEDURAL STATES: Chronic | ICD-10-CM

## 2022-12-29 DIAGNOSIS — Z29.9 ENCOUNTER FOR PROPHYLACTIC MEASURES, UNSPECIFIED: ICD-10-CM

## 2022-12-29 LAB — HCG UR QL: NEGATIVE — SIGNIFICANT CHANGE UP

## 2022-12-29 PROCEDURE — 88307 TISSUE EXAM BY PATHOLOGIST: CPT | Mod: 26

## 2022-12-29 PROCEDURE — 99222 1ST HOSP IP/OBS MODERATE 55: CPT

## 2022-12-29 PROCEDURE — 43775 LAP SLEEVE GASTRECTOMY: CPT

## 2022-12-29 DEVICE — STAPLER COVIDIEN TRI-STAPLE 60MM BLACK INTELLIGENT RELOAD: Type: IMPLANTABLE DEVICE | Site: ABDOMINAL | Status: FUNCTIONAL

## 2022-12-29 DEVICE — VISTASEAL FIBRIN HUMAN 4ML: Type: IMPLANTABLE DEVICE | Site: ABDOMINAL | Status: FUNCTIONAL

## 2022-12-29 DEVICE — STAPLER COVIDIEN TRI-STAPLE 45MM BLACK INTELLIGENT RELOAD: Type: IMPLANTABLE DEVICE | Site: ABDOMINAL | Status: FUNCTIONAL

## 2022-12-29 RX ORDER — ENOXAPARIN SODIUM 100 MG/ML
40 INJECTION SUBCUTANEOUS ONCE
Refills: 0 | Status: DISCONTINUED | OUTPATIENT
Start: 2022-12-29 | End: 2022-12-29

## 2022-12-29 RX ORDER — ONDANSETRON 8 MG/1
4 TABLET, FILM COATED ORAL ONCE
Refills: 0 | Status: DISCONTINUED | OUTPATIENT
Start: 2022-12-29 | End: 2022-12-29

## 2022-12-29 RX ORDER — IBUPROFEN 200 MG
800 TABLET ORAL EVERY 6 HOURS
Refills: 0 | Status: DISCONTINUED | OUTPATIENT
Start: 2022-12-29 | End: 2022-12-30

## 2022-12-29 RX ORDER — ACETAMINOPHEN 500 MG
1000 TABLET ORAL EVERY 6 HOURS
Refills: 0 | Status: DISCONTINUED | OUTPATIENT
Start: 2022-12-30 | End: 2022-12-30

## 2022-12-29 RX ORDER — ACETAMINOPHEN 500 MG
30 TABLET ORAL
Qty: 0 | Refills: 0 | DISCHARGE

## 2022-12-29 RX ORDER — ACETAMINOPHEN 500 MG
1000 TABLET ORAL EVERY 6 HOURS
Refills: 0 | Status: COMPLETED | OUTPATIENT
Start: 2022-12-29 | End: 2022-12-30

## 2022-12-29 RX ORDER — APREPITANT 80 MG/1
40 CAPSULE ORAL ONCE
Refills: 0 | Status: COMPLETED | OUTPATIENT
Start: 2022-12-29 | End: 2022-12-29

## 2022-12-29 RX ORDER — ONDANSETRON 8 MG/1
1 TABLET, FILM COATED ORAL
Qty: 0 | Refills: 0 | DISCHARGE

## 2022-12-29 RX ORDER — HYDROMORPHONE HYDROCHLORIDE 2 MG/ML
0.5 INJECTION INTRAMUSCULAR; INTRAVENOUS; SUBCUTANEOUS
Refills: 0 | Status: DISCONTINUED | OUTPATIENT
Start: 2022-12-29 | End: 2022-12-29

## 2022-12-29 RX ORDER — SODIUM CHLORIDE 9 MG/ML
1000 INJECTION, SOLUTION INTRAVENOUS
Refills: 0 | Status: DISCONTINUED | OUTPATIENT
Start: 2022-12-29 | End: 2022-12-29

## 2022-12-29 RX ORDER — HYDROMORPHONE HYDROCHLORIDE 2 MG/ML
1 INJECTION INTRAMUSCULAR; INTRAVENOUS; SUBCUTANEOUS
Refills: 0 | Status: DISCONTINUED | OUTPATIENT
Start: 2022-12-29 | End: 2022-12-29

## 2022-12-29 RX ORDER — CHLORHEXIDINE GLUCONATE 213 G/1000ML
1 SOLUTION TOPICAL ONCE
Refills: 0 | Status: COMPLETED | OUTPATIENT
Start: 2022-12-29 | End: 2022-12-29

## 2022-12-29 RX ORDER — OMEPRAZOLE 10 MG/1
1 CAPSULE, DELAYED RELEASE ORAL
Qty: 0 | Refills: 0 | DISCHARGE

## 2022-12-29 RX ORDER — OXYCODONE HYDROCHLORIDE 5 MG/1
1 TABLET ORAL
Qty: 0 | Refills: 0 | DISCHARGE

## 2022-12-29 RX ADMIN — Medication 1000 MILLIGRAM(S): at 23:38

## 2022-12-29 RX ADMIN — SODIUM CHLORIDE 75 MILLILITER(S): 9 INJECTION, SOLUTION INTRAVENOUS at 14:08

## 2022-12-29 RX ADMIN — HYDROMORPHONE HYDROCHLORIDE 0.5 MILLIGRAM(S): 2 INJECTION INTRAMUSCULAR; INTRAVENOUS; SUBCUTANEOUS at 21:20

## 2022-12-29 RX ADMIN — HYDROMORPHONE HYDROCHLORIDE 0.5 MILLIGRAM(S): 2 INJECTION INTRAMUSCULAR; INTRAVENOUS; SUBCUTANEOUS at 13:33

## 2022-12-29 RX ADMIN — SODIUM CHLORIDE 150 MILLILITER(S): 9 INJECTION, SOLUTION INTRAVENOUS at 21:00

## 2022-12-29 RX ADMIN — Medication 1000 MILLIGRAM(S): at 18:00

## 2022-12-29 RX ADMIN — Medication 400 MILLIGRAM(S): at 23:29

## 2022-12-29 RX ADMIN — Medication 400 MILLIGRAM(S): at 14:08

## 2022-12-29 RX ADMIN — ONDANSETRON 4 MILLIGRAM(S): 8 TABLET, FILM COATED ORAL at 23:29

## 2022-12-29 RX ADMIN — SODIUM CHLORIDE 1000 MILLILITER(S): 9 INJECTION, SOLUTION INTRAVENOUS at 09:22

## 2022-12-29 RX ADMIN — APREPITANT 40 MILLIGRAM(S): 80 CAPSULE ORAL at 09:22

## 2022-12-29 RX ADMIN — SODIUM CHLORIDE 1000 MILLILITER(S): 9 INJECTION, SOLUTION INTRAVENOUS at 13:22

## 2022-12-29 RX ADMIN — SODIUM CHLORIDE 150 MILLILITER(S): 9 INJECTION, SOLUTION INTRAVENOUS at 17:51

## 2022-12-29 RX ADMIN — Medication 400 MILLIGRAM(S): at 17:50

## 2022-12-29 RX ADMIN — CHLORHEXIDINE GLUCONATE 1 APPLICATION(S): 213 SOLUTION TOPICAL at 09:23

## 2022-12-29 RX ADMIN — HYDROMORPHONE HYDROCHLORIDE 0.5 MILLIGRAM(S): 2 INJECTION INTRAMUSCULAR; INTRAVENOUS; SUBCUTANEOUS at 16:57

## 2022-12-29 RX ADMIN — HYDROMORPHONE HYDROCHLORIDE 0.5 MILLIGRAM(S): 2 INJECTION INTRAMUSCULAR; INTRAVENOUS; SUBCUTANEOUS at 21:01

## 2022-12-29 RX ADMIN — ONDANSETRON 4 MILLIGRAM(S): 8 TABLET, FILM COATED ORAL at 17:50

## 2022-12-29 RX ADMIN — HYDROMORPHONE HYDROCHLORIDE 0.5 MILLIGRAM(S): 2 INJECTION INTRAMUSCULAR; INTRAVENOUS; SUBCUTANEOUS at 13:23

## 2022-12-29 RX ADMIN — Medication 800 MILLIGRAM(S): at 14:45

## 2022-12-29 NOTE — DISCHARGE NOTE PROVIDER - CARE PROVIDER_API CALL
Chong Rojas)  Surgery  [address] [place] [address]  Phone: [phone]  Fax: [phone]  Follow Up Time:

## 2022-12-29 NOTE — CHART NOTE - NSCHARTNOTEFT_GEN_A_CORE
Patient after surgery this morning for Lap Sleeve gastrectomy w/ intra-op EGD.  Pt was resting in PACU, in some pain, just given caldolor, No N/V. Incisions clean and dry. Pt encouraged to walk around later, as was the use of ice on incisions, incentive spirometer, and abdominal binder. Call bell was available and in reach questions answered. Patient after surgery this morning for Lap Sleeve gastrectomy w/ intra-op EGD.  Pt was resting in PACU, in some pain, just given caldolor, No N/V. Incisions clean and dry. Pt encouraged to walk around later, as was the use of ice on incisions, incentive spirometer, and abdominal binder. Vitals Nl. Call bell was available and in reach questions answered.

## 2022-12-29 NOTE — DISCHARGE NOTE PROVIDER - NSDCFUSCHEDAPPT_GEN_ALL_CORE_FT
Chong Rojas  [facility] Physician [facility]  BARIATRIC [address]  Scheduled Appointment: 01/04/2023    Chong Rojas  [facility]  BARIATRIC [address]  Scheduled Appointment: 02/02/2023    [facility]  WEIGHTMGMT [address]  Scheduled Appointment: 02/28/2023

## 2022-12-29 NOTE — PATIENT PROFILE ADULT - FALL HARM RISK - UNIVERSAL INTERVENTIONS
Bed in lowest position, wheels locked, appropriate side rails in place/Call bell, personal items and telephone in reach/Instruct patient to call for assistance before getting out of bed or chair/Non-slip footwear when patient is out of bed/[place] to call system/Physically safe environment - no spills, clutter or unnecessary equipment/Purposeful Proactive Rounding/Room/bathroom lighting operational, light cord in reach

## 2022-12-29 NOTE — PHYSICAL EXAM
[Obese, well nourished, in no acute distress] : obese, well nourished, in no acute distress [Normal] : affect appropriate [de-identified] : Obese, soft, nontender, nondistended, positive bowel sounds in all four quadrants.  No hernia or masses.  Pfannenstiel incision from .

## 2022-12-29 NOTE — PRE-OP CHECKLIST - BP NONINVASIVE SYSTOLIC (MM HG)
133 Ketoconazole Counseling:   Patient counseled regarding improving absorption with orange juice.  Adverse effects include but are not limited to breast enlargement, headache, diarrhea, nausea, upset stomach, liver function test abnormalities, taste disturbance, and stomach pain.  There is a rare possibility of liver failure that can occur when taking ketoconazole. The patient understands that monitoring of LFTs may be required, especially at baseline. The patient verbalized understanding of the proper use and possible adverse effects of ketoconazole.  All of the patient's questions and concerns were addressed.

## 2022-12-29 NOTE — ASSESSMENT
[FreeTextEntry1] : Final preop assessment in anticipation for Sleeve Gastrectomy. Workup complete.\par Will scheduled for SLeeve Gastrectomy.\par \par Once again, Risk, Benefits, and Alternatives to surgery have been discussed.  This includes but is not limited to bleeding, infection, damage to adjacent structures, need for additional surgery or interventions, adverse effects of anesthesia such as cardio-respiratory complications, prolonged intubation, cardiac arrhythmia, arrest, and or death.  Risks of forgoing surgery have also been discussed including progression of, and/or worsening of current condition which may then require urgent or emergent treatment or surgery.\par \par Nutritional counseling has been provided. 2 week preoperative modified liquid diet has been reviewed with the patient and all questions answered. The patient is encouraged to remain calorie conscious and continue a low fat, low carbohydrate, high protein diet. Also, emphasis has been placed on the importance of adequate hydration, multi-vitamin supplementation and exercise.  (15 min)\par \par Routine PST to be arranged and she will be scheduled accordingly for surgery.\par \par f/u post op.

## 2022-12-29 NOTE — CONSULT NOTE ADULT - SUBJECTIVE AND OBJECTIVE BOX
Patient is a 32y old  Female who presents with a chief complaint of Morbid Obesity (29 Dec 2022 12:22)      HPI:        PAST MEDICAL & SURGICAL HISTORY:  Fibroids      Exposure to COVID-19 virus        Obesity, morbid, BMI 50 or higher      DOTTIE on CPAP      Anemia      H/O:  section  2007 m failure to dilate 8lbs      History of dilation and curettage            Allergies    No Known Allergies    Intolerances        Home Medications:  acetaminophen 500 mg/15 mL oral liquid: 30 milliliter(s) orally every 6 hours. Take for 3 days but no more than 5 days (29 Dec 2022 12:34)  omeprazole 20 mg oral delayed release capsule: 1 cap(s) orally once a day. Open capsule and sprinkle into low fat yogurt, pudding or applesauce. (29 Dec 2022 12:34)  ondansetron 4 mg oral tablet, disintegratin tab(s) orally 3 times a day, As Needed for nausea (29 Dec 2022 12:34)  oxyCODONE 5 mg oral tablet: 1 tab(s) orally every 6 hours, As Needed for moderate to severe pain. Crush pill and put in low fat yogurt, pudding or applesauce. (29 Dec 2022 12:34)      MEDICATIONS  (STANDING):  acetaminophen   IVPB .. 1000 milliGRAM(s) IV Intermittent once  ceFAZolin   IVPB 3000 milliGRAM(s) IV Intermittent once  enoxaparin Injectable 40 milliGRAM(s) SubCutaneous once  lactated ringers. 2000 milliLiter(s) (1000 mL/Hr) IV Continuous <Continuous>    MEDICATIONS  (PRN):  ibuprofen IVPB .. 800 milliGRAM(s) IV Intermittent every 6 hours PRN Moderate Pain (4 - 6)      FAMILY HISTORY:  Family history of breast cancer in mother (Mother)        Social History:     REVIEW OF SYSTEMS:  CONSTITUTIONAL: No fever or chills.   EYES: No eye pain or discharge  ENMT: No sinus or throat pain  NECK: No pain or stiffness  BREASTS: No pain, masses, or nipple discharge  RESPIRATORY: No cough or shortness of breath  CARDIOVASCULAR: No chest pain, palpitations, dizziness.   GASTROINTESTINAL: No abdominal or epigastric pain. No nausea or vomiting.  GENITOURINARY: No dysuria, hematuria, or incontinence  NEUROLOGICAL: No headaches,  numbness, or tremors  SKIN: No itching, burning, rashes, or lesions   LYMPH NODES: No enlarged glands  ENDOCRINE: No polydipsia or polyuria  MUSCULOSKELETAL: No joint pain or swelling;  PSYCHIATRIC: No difficulty sleeping  HEME/LYMPH: No easy bruising, or bleeding gums  ALLERGY AND IMMUNOLOGIC: No hives or eczema    Vital Signs Last 24 Hrs  T(C): 37 (29 Dec 2022 15:58), Max: 37 (29 Dec 2022 15:58)  T(F): 98.6 (29 Dec 2022 15:58), Max: 98.6 (29 Dec 2022 15:58)  HR: 97 (29 Dec 2022 15:58) (82 - 97)  BP: 157/87 (29 Dec 2022 15:58) (133/75 - 175/100)  BP(mean): --  RR: 18 (29 Dec 2022 15:58) (15 - 22)  SpO2: 99% (29 Dec 2022 15:58) (95% - 100%)    Parameters below as of 29 Dec 2022 15:58  Patient On (Oxygen Delivery Method): room air        PHYSICAL EXAM:  GENERAL: NAD, well-groomed, well-developed  HEAD:  Atraumatic, Normocephalic  EYES:  Pallor +  ENMT: Moist mucous membranes, no lesions  NECK: Supple, No JVD, Normal thyroid  CHEST/LUNG: Decreased breath sounds at bases, rest is clear.   HEART: Regular rate and rhythm; No murmurs, rubs, or gallops  ABDOMEN: Soft, Nontender, Nondistended; Bowel sounds present  EXTREMITIES:  Pulses +  LYMPH: No lymphadenopathy noted  SKIN: No rashes or lesions  NERVOUS SYSTEM:  No focal deficit.   PSYCH:  Awake and alert.    LABS:              CAPILLARY BLOOD GLUCOSE            RADIOLOGY & ADDITIONAL TESTS:    Imaging Personally Reviewed:  [X] YES  [ ] NO   Patient is a 32y old  Female who presents with a chief complaint of Morbid Obesity     HPI: 32 years old female with history of fibroids, DOTTIE, morbid obesity, who has struggled with weight all her life. She tried multiple diets and exercise regimens without any success. Patient was advised laparoscopic sleeve gastrectomy. Patient is being seen postoperatively for medical comanagement.     Out patient records reviewed.   Patient c/o incisional pain.   She denies any chest pain or pressure.   No nausea or vomiting.   No fever or chills.     PAST MEDICAL & SURGICAL HISTORY:  Fibroids    Exposure to COVID-19 virus      Obesity, morbid, BMI 50 or higher    DOTTIE on CPAP    Anemia    H/O:  section   m failure to dilate 8lbs    History of dilation and curettage      Allergies: No Known Allergies    Intolerances    Home Medications:  acetaminophen 500 mg/15 mL oral liquid: 30 milliliter(s) orally every 6 hours. Take for 3 days but no more than 5 days (29 Dec 2022 12:34)  omeprazole 20 mg oral delayed release capsule: 1 cap(s) orally once a day. Open capsule and sprinkle into low fat yogurt, pudding or applesauce. (29 Dec 2022 12:34)  ondansetron 4 mg oral tablet, disintegratin tab(s) orally 3 times a day, As Needed for nausea (29 Dec 2022 12:34)  oxyCODONE 5 mg oral tablet: 1 tab(s) orally every 6 hours, As Needed for moderate to severe pain. Crush pill and put in low fat yogurt, pudding or applesauce. (29 Dec 2022 12:34)      MEDICATIONS  (STANDING):  acetaminophen   IVPB .. 1000 milliGRAM(s) IV Intermittent once  ceFAZolin   IVPB 3000 milliGRAM(s) IV Intermittent once  enoxaparin Injectable 40 milliGRAM(s) SubCutaneous once  lactated ringers. 2000 milliLiter(s) (1000 mL/Hr) IV Continuous <Continuous>    MEDICATIONS  (PRN):  ibuprofen IVPB .. 800 milliGRAM(s) IV Intermittent every 6 hours PRN Moderate Pain (4 - 6)    FAMILY HISTORY:  Family history of breast cancer in mother (Mother)    Social History: Denies any smoking or alcohol abuse.     REVIEW OF SYSTEMS:  CONSTITUTIONAL: No fever or chills.   EYES: No eye pain or discharge  ENMT: No sinus or throat pain  NECK: No pain or stiffness  BREASTS: No pain, masses, or nipple discharge  RESPIRATORY: No cough or shortness of breath  CARDIOVASCULAR: No chest pain, palpitations, dizziness.   GASTROINTESTINAL: + incisional pain. No nausea or vomiting.  GENITOURINARY: No dysuria, hematuria, or incontinence  NEUROLOGICAL: No headaches,  numbness, or tremors  SKIN: No itching, burning, rashes, or lesions   LYMPH NODES: No enlarged glands  ENDOCRINE: No polydipsia or polyuria  MUSCULOSKELETAL: No joint pain or swelling;  PSYCHIATRIC: No difficulty sleeping  HEME/LYMPH: No easy bruising, or bleeding gums  ALLERGY AND IMMUNOLOGIC: No hives or eczema    Vital Signs Last 24 Hrs  T(C): 37 (29 Dec 2022 15:58), Max: 37 (29 Dec 2022 15:58)  T(F): 98.6 (29 Dec 2022 15:58), Max: 98.6 (29 Dec 2022 15:58)  HR: 97 (29 Dec 2022 15:58) (82 - 97)  BP: 157/87 (29 Dec 2022 15:58) (133/75 - 175/100)  BP(mean): --  RR: 18 (29 Dec 2022 15:58) (15 - 22)  SpO2: 99% (29 Dec 2022 15:58) (95% - 100%)    Parameters below as of 29 Dec 2022 15:58  Patient On (Oxygen Delivery Method): room air        PHYSICAL EXAM:  GENERAL: NAD, well-groomed, well-developed  HEAD:  Atraumatic, Normocephalic  EYES:  Pallor +  ENMT: Moist mucous membranes, no lesions  NECK: Supple, No JVD, Normal thyroid  CHEST/LUNG: Decreased breath sounds at bases, rest is clear.   HEART: Regular rate and rhythm; No murmurs, rubs, or gallops  ABDOMEN: Soft, Some incisional pain on gentle palpation.  EXTREMITIES:  Pulses +  LYMPH: No lymphadenopathy noted  SKIN: No rashes or lesions  NERVOUS SYSTEM:  No focal deficit.   PSYCH:  Awake and alert.    LABS:  Complete Blood Count (22 @ 10:19)   Nucleated RBC: 0 /100 WBCs   WBC Count: 7.79 K/uL   RBC Count: 4.64 M/uL   Hemoglobin: 13.5 g/dL   Hematocrit: 40.3 %   Mean Cell Volume: 86.9 fl   Mean Cell Hemoglobin: 29.1 pg   Mean Cell Hemoglobin Conc: 33.5 gm/dL   Red Cell Distrib Width: 12.9 %   Platelet Count - Automated: 369 K/uL   Basic Metabolic Panel (22 @ 10:19)   Sodium, Serum: 138 mmol/L   Potassium, Serum: 3.8 mmol/L   Chloride, Serum: 102 mmol/L   Carbon Dioxide, Serum: 26 mmol/L   Anion Gap, Serum: 10 mmol/L   Blood Urea Nitrogen, Serum: 9 mg/dL   Creatinine, Serum: 0.73 mg/dL   Glucose, Serum: 99 mg/dL   Calcium, Total Serum: 9.0 mg/dL       RADIOLOGY & ADDITIONAL TESTS:    Imaging Personally Reviewed:  [X] YES  [ ] NO

## 2022-12-29 NOTE — DISCHARGE NOTE PROVIDER - NSDCMRMEDTOKEN_GEN_ALL_CORE_FT
acetaminophen 500 mg/15 mL oral liquid: 30 milliliter(s) orally every 6 hours. Take for 3 days but no more than 5 days  omeprazole 20 mg oral delayed release capsule: 1 cap(s) orally once a day. Open capsule and sprinkle into low fat yogurt, pudding or applesauce.  ondansetron 4 mg oral tablet, disintegratin tab(s) orally 3 times a day, As Needed for nausea  oxyCODONE 5 mg oral tablet: 1 tab(s) orally every 6 hours, As Needed for moderate to severe pain. Crush pill and put in low fat yogurt, pudding or applesauce.

## 2022-12-29 NOTE — CONSULT NOTE ADULT - ASSESSMENT
32 years old female with history of fibroids, DOTTIE, morbid obesity, who has struggled with weight all her life. She tried multiple diets and exercise regimens without any success. Patient was advised laparoscopic sleeve gastrectomy. Patient is being seen postoperatively for medical comanagement.

## 2022-12-29 NOTE — CONSULT NOTE ADULT - PROBLEM SELECTOR RECOMMENDATION 9
Patient is s/p sleeve gastrectomy, POD # 0.   Continue IV hydration per routine.   Continue perioperative antibiotics per protocol.   Pain medications as needed.   Diet as per surgery.   Encourage ambulation and incentive spirometry.   Monitor patient for post op anemia and post op fever.   Monitor labs.   Further management as per patient's clinical course.

## 2022-12-29 NOTE — PATIENT PROFILE ADULT - NAME OF PERSON WHO ASSISTED
Marnie Grace Mother Ivermectin Counseling:  Patient instructed to take medication on an empty stomach with a full glass of water.  Patient informed of potential adverse effects including but not limited to nausea, diarrhea, dizziness, itching, and swelling of the extremities or lymph nodes.  The patient verbalized understanding of the proper use and possible adverse effects of ivermectin.  All of the patient's questions and concerns were addressed.

## 2022-12-29 NOTE — DISCHARGE NOTE PROVIDER - HOSPITAL COURSE
Pt is a 31 y/o F with PMHx of morbid obesity, admitted to hospital on 12/29/22 and underwent laparoscopic sleeve gastrectomy by Dr. LUCINA Rojas.  Patient tolerated procedure well and progressed appropriately. Currently tolerating Bariatric Phase 1 Clears diet, voiding independently with appropriate volume and ambulating. Nutritional guidelines were reviewed with Nutritionist, and patient instructed to drink small frequent amounts, start protein drinks and follow dietary guidelines.  Discharged on DATE with home medications, incentive spirometer, abdominal binder and MEDS TO BED to follow-up with Bariatric Surgeon Dr. LUCINA Rojas in 1 week.   Pt is a 31 y/o F with PMHx of morbid obesity, admitted to hospital on 12/29/22 and underwent laparoscopic sleeve gastrectomy by Dr. LUCINA Rojas.  Patient tolerated procedure well and progressed appropriately. Currently tolerating Bariatric Phase 1 Clears diet, voiding independently with appropriate volume and ambulating. Nutritional guidelines were reviewed with Nutritionist, and patient instructed to drink small frequent amounts, start protein drinks and follow dietary guidelines.  Discharged on 12/30/2022 with home medications, incentive spirometer, abdominal binder and MEDS TO BED to follow-up with Bariatric Surgeon Dr. LUCINA Rojas in 1 week.

## 2022-12-29 NOTE — DISCHARGE NOTE PROVIDER - NSDCCPTREATMENT_GEN_ALL_CORE_FT
PRINCIPAL PROCEDURE  Procedure: Gastrectomy, sleeve, laparoscopic, with EDG  Findings and Treatment:

## 2022-12-30 ENCOUNTER — TRANSCRIPTION ENCOUNTER (OUTPATIENT)
Age: 32
End: 2022-12-30

## 2022-12-30 VITALS
DIASTOLIC BLOOD PRESSURE: 78 MMHG | HEART RATE: 80 BPM | RESPIRATION RATE: 16 BRPM | TEMPERATURE: 99 F | OXYGEN SATURATION: 97 % | SYSTOLIC BLOOD PRESSURE: 98 MMHG

## 2022-12-30 LAB
ANION GAP SERPL CALC-SCNC: 7 MMOL/L — SIGNIFICANT CHANGE UP (ref 5–17)
BUN SERPL-MCNC: 5 MG/DL — LOW (ref 7–23)
CALCIUM SERPL-MCNC: 8.8 MG/DL — SIGNIFICANT CHANGE UP (ref 8.4–10.5)
CHLORIDE SERPL-SCNC: 102 MMOL/L — SIGNIFICANT CHANGE UP (ref 96–108)
CO2 SERPL-SCNC: 28 MMOL/L — SIGNIFICANT CHANGE UP (ref 22–31)
CREAT SERPL-MCNC: 0.72 MG/DL — SIGNIFICANT CHANGE UP (ref 0.5–1.3)
EGFR: 112 ML/MIN/1.73M2 — SIGNIFICANT CHANGE UP
GLUCOSE SERPL-MCNC: 104 MG/DL — HIGH (ref 70–99)
HCT VFR BLD CALC: 37.7 % — SIGNIFICANT CHANGE UP (ref 34.5–45)
HGB BLD-MCNC: 12.7 G/DL — SIGNIFICANT CHANGE UP (ref 11.5–15.5)
MCHC RBC-ENTMCNC: 28.8 PG — SIGNIFICANT CHANGE UP (ref 27–34)
MCHC RBC-ENTMCNC: 33.7 GM/DL — SIGNIFICANT CHANGE UP (ref 32–36)
MCV RBC AUTO: 85.5 FL — SIGNIFICANT CHANGE UP (ref 80–100)
NRBC # BLD: 0 /100 WBCS — SIGNIFICANT CHANGE UP (ref 0–0)
PLATELET # BLD AUTO: 371 K/UL — SIGNIFICANT CHANGE UP (ref 150–400)
POTASSIUM SERPL-MCNC: 3.8 MMOL/L — SIGNIFICANT CHANGE UP (ref 3.5–5.3)
POTASSIUM SERPL-SCNC: 3.8 MMOL/L — SIGNIFICANT CHANGE UP (ref 3.5–5.3)
RBC # BLD: 4.41 M/UL — SIGNIFICANT CHANGE UP (ref 3.8–5.2)
RBC # FLD: 12.7 % — SIGNIFICANT CHANGE UP (ref 10.3–14.5)
SODIUM SERPL-SCNC: 137 MMOL/L — SIGNIFICANT CHANGE UP (ref 135–145)
WBC # BLD: 11.95 K/UL — HIGH (ref 3.8–10.5)
WBC # FLD AUTO: 11.95 K/UL — HIGH (ref 3.8–10.5)

## 2022-12-30 PROCEDURE — 99232 SBSQ HOSP IP/OBS MODERATE 35: CPT

## 2022-12-30 PROCEDURE — 80048 BASIC METABOLIC PNL TOTAL CA: CPT

## 2022-12-30 PROCEDURE — C1889: CPT

## 2022-12-30 PROCEDURE — 81025 URINE PREGNANCY TEST: CPT

## 2022-12-30 PROCEDURE — 85027 COMPLETE CBC AUTOMATED: CPT

## 2022-12-30 PROCEDURE — 88307 TISSUE EXAM BY PATHOLOGIST: CPT

## 2022-12-30 PROCEDURE — 99024 POSTOP FOLLOW-UP VISIT: CPT

## 2022-12-30 PROCEDURE — 36415 COLL VENOUS BLD VENIPUNCTURE: CPT

## 2022-12-30 RX ADMIN — Medication 1000 MILLIGRAM(S): at 12:00

## 2022-12-30 RX ADMIN — ONDANSETRON 4 MILLIGRAM(S): 8 TABLET, FILM COATED ORAL at 11:47

## 2022-12-30 RX ADMIN — Medication 1000 MILLIGRAM(S): at 05:15

## 2022-12-30 RX ADMIN — ONDANSETRON 4 MILLIGRAM(S): 8 TABLET, FILM COATED ORAL at 05:08

## 2022-12-30 RX ADMIN — PANTOPRAZOLE SODIUM 40 MILLIGRAM(S): 20 TABLET, DELAYED RELEASE ORAL at 11:47

## 2022-12-30 RX ADMIN — Medication 400 MILLIGRAM(S): at 11:46

## 2022-12-30 RX ADMIN — Medication 400 MILLIGRAM(S): at 08:03

## 2022-12-30 RX ADMIN — Medication 800 MILLIGRAM(S): at 08:30

## 2022-12-30 RX ADMIN — Medication 400 MILLIGRAM(S): at 05:07

## 2022-12-30 RX ADMIN — ENOXAPARIN SODIUM 40 MILLIGRAM(S): 100 INJECTION SUBCUTANEOUS at 08:03

## 2022-12-30 NOTE — PROGRESS NOTE ADULT - SUBJECTIVE AND OBJECTIVE BOX
Pre-Op Dx: Morbid obesity  Procedure: Gastrectomy, sleeve, laparoscopic, with EDG      Surgeon: Dr. LUCINA Rojas     HPI:   32y year old Female s/p Gastrectomy, sleeve, laparoscopic, with EDG    POD #1 Pt seen and examined at the bedside. Patient is ambulating on the floor. Plan to start bariatric clear diet as tolerated this am. Pt was just given her breakfast tray at time of visit. Will reassess if tolerating clear liquid diet. Patient is urinating well.  Minimal incisional discomfort. Denies any nausea or vomiting.   Ambulating on Floor/ Voided this am./ Utilizing incentive spirometry as instructed. Pt w/ no complaints.    LABS:                        12.7   11.95 )-----------( 371      ( 30 Dec 2022 07:30 )             37.7       CAPILLARY BLOOD GLUCOSE    VITALS:  Vital Signs Last 24 Hrs  T(C): 36.9 (30 Dec 2022 08:06), Max: 37.2 (29 Dec 2022 17:51)  T(F): 98.5 (30 Dec 2022 08:06), Max: 99 (29 Dec 2022 17:51)  HR: 84 (30 Dec 2022 08:06) (80 - 98)  BP: 123/79 (30 Dec 2022 08:06) (123/79 - 175/100)  RR: 16 (30 Dec 2022 08:06) (15 - 22)  SpO2: 100% (30 Dec 2022 08:06) (95% - 100%)    Parameters below as of 30 Dec 2022 03:41  Patient On (Oxygen Delivery Method): room air        12-29 @ 07:01  -  12-30 @ 07:00  --------------------------------------------------------  IN: 3950 mL / OUT: 3635 mL / NET: 315 mL        Physical Exam:  General: A/O x 3, NAD, resting comfortably in bed  Abdominal: soft, ND, nontender to palpation, incisions C/D/I  Extremities: no edema, no calf tenderness B/L    Radiology and Additional Studies:    Assessment: 32y Female s/p above procedure hemodynamically stable.     Plan:  Cont GI / DVT prophylaxis.   Dietician consult.   Cont  OOB / ambulation on floor / incentive spirometry.  Cont bariatric clear diet as tolerated .  Discussed and reviewed home meds  and pain medication with patient . Discussed medication that requires crushing.  Plan to begin protein shakes at home.  Possibly discharged later today or tomorrow.  Dr. Rojas 5o be notitifed

## 2022-12-30 NOTE — DISCHARGE NOTE NURSING/CASE MANAGEMENT/SOCIAL WORK - PATIENT PORTAL LINK FT
You can access the FollowMyHealth Patient Portal offered by [facility] by registering at the following website: [email or web address]. By joining [facility]’s FollowMyHealth portal, you will also be able to view your health information using other applications (apps) compatible with our system.

## 2022-12-30 NOTE — PROGRESS NOTE ADULT - SUBJECTIVE AND OBJECTIVE BOX
INTERVAL HPI/OVERNIGHT EVENTS:   Patient seen and examined.  Starting clears this morning.  Mild diffuse abd discomfort.    REVIEW OF SYSTEMS:  See HPI,  all others negative    PHYSICAL EXAM:  Vital Signs Last 24 Hrs  T(C): 36.9 (30 Dec 2022 08:06), Max: 37.2 (29 Dec 2022 17:51)  T(F): 98.5 (30 Dec 2022 08:06), Max: 99 (29 Dec 2022 17:51)  HR: 84 (30 Dec 2022 08:06) (80 - 98)  BP: 123/79 (30 Dec 2022 08:06) (123/79 - 175/100)  BP(mean): --  RR: 16 (30 Dec 2022 08:06) (15 - 22)  SpO2: 100% (30 Dec 2022 08:06) (95% - 100%)    Parameters below as of 30 Dec 2022 03:41  Patient On (Oxygen Delivery Method): room air    GENERAL: NAD, well-groomed, well-developed, awake, alert, oriented x 3, fluent and coherent speech  EYES: EOMI, PERRLA, conjunctiva and sclera clear  ENMT: No tonsillar erythema, exudates, or enlargement; Moist mucous membranes,  No lesions seen on oral mucosa  NECK: Supple, No JVD, No Cervical LAD   NERVOUS SYSTEM:  Good concentration; Moving all 4 extremities against gravity and resistance; No gross sensory deficits, No facial droop  CHEST/LUNG: Clear to auscultation bilaterally with good air entry; No rales, rhonchi, wheezing, or rubs  HEART: Regular rate and rhythm; No murmurs, rubs, or gallops  ABDOMEN: Soft, mild diffuse tenderness, incision sites c/d/i, No palpable masses or organomegaly, No bruits  EXTREMITIES:  2+ Peripheral Pulses, No clubbing, cyanosis, or edema, no calf tenderness in either leg    Diagnostic Testin.7   11.95 )-----------( 371      ( 30 Dec 2022 07:30 )             37.7     30 Dec 2022 07:30    137    |  102    |  5      ----------------------------<  104    3.8     |  28     |  0.72     Ca    8.8        30 Dec 2022 07:30

## 2022-12-30 NOTE — PROGRESS NOTE ADULT - ASSESSMENT
Problem/Recommendation - 1:  ·  Problem: Severe obesity.   ·  Recommendation: Patient is s/p sleeve gastrectomy, POD # 1  Continue IV hydration per routine.   Continue perioperative antibiotics per protocol.   Pain medications as needed.   Diet as per surgery.   Encourage ambulation and incentive spirometry.   Monitor patient for post op anemia and post op fever.   Monitor labs.   Further management as per patient's clinical course.     Problem/Recommendation - 2:  ·  Problem: DOTTIE on CPAP.   ·  Recommendation: CPAP at night.

## 2022-12-30 NOTE — PROGRESS NOTE ADULT - NS ATTEND AMEND GEN_ALL_CORE FT
Pt seen and examined.  POD 1 s/p Lap sleeve  diet advanced to Bariartic 1 this morning and tolerated.  No acute overnight events  comfortable  Monitor PO intake and plan dc home this afternoon.

## 2022-12-30 NOTE — DISCHARGE NOTE NURSING/CASE MANAGEMENT/SOCIAL WORK - NSDCPEFALRISK_GEN_ALL_CORE
For information on Fall & Injury Prevention, visit: [email or web address] OR  [email or web address] OR  https://www.cdc.gov/steadi/patient.html

## 2022-12-30 NOTE — PROGRESS NOTE ADULT - REASON FOR ADMISSION
Elective surgery for morbid obesity
Patient is a 32y old  Female who presents with a chief complaint of Elective surgery for morbid obesity (30 Dec 2022 08:29)

## 2022-12-31 ENCOUNTER — NON-APPOINTMENT (OUTPATIENT)
Age: 32
End: 2022-12-31

## 2023-01-02 NOTE — LACTATION INITIAL EVALUATION - ACTUAL PROBLEM
"CLINICALS AND D/C SUMMARY  UR MANAGER; LATOSHA KAISER [phone] AND [phone]      Razia Lance (24 y.o. Female)     Date of Birth   1998    Social Security Number       Address   [facility] [place] [address]    Home Phone   [phone]    MRN   [id number]       Yarsanism   None    Marital Status                               Admission Date   22    Admission Type   Elective    Admitting Provider   Bryan Reid MD    Attending Provider       Department, Room/Bed   [facility], W202/       Discharge Date   2023    Discharge Disposition   Home or Self Care    Discharge Destination                               Attending Provider: (none)   Allergies: No Known Allergies    Isolation: None   Infection: None   Code Status: Prior    Ht: 162.6 cm (64\")   Wt: 141 kg (311 lb)    Admission Cmt: None   Principal Problem: Pregnancy [Z34.90]                 Active Insurance as of 2022     Primary Coverage     Payor Plan Insurance Group Employer/Plan Group    [employer] [place] [employer] [place]      Payor Plan Address Payor Plan Phone Number Payor Plan Fax Number Effective Dates    [address] [address] [phone]  10/1/2019 - None Entered    [facility] [id number]       Subscriber Name Subscriber Birth Date Member ID       RAZIA LANCE 1998 [id number]                 Emergency Contacts      (Rel.) Home Phone Work Phone Mobile Phone    CASTRO LANCE (Spouse) [phone] -- --               History & Physical      Bryan Reid MD at 22 0731          History and Physical        Subjective      Razia Lance is a 24 y.o. year old  who present for surgery due to IUP @ 39+ weeks, prior C/S, desired permanent sterilization.     Medical History        Past Medical History:   Diagnosis Date   • PMS (premenstrual syndrome)     • Positive testing for group B Streptococcus 2020   • Rh negative status during pregnancy " 2019         Surgical History         Past Surgical History:   Procedure Laterality Date   •  SECTION N/A 2020     Procedure:  SECTION PRIMARY;  Surgeon: Bryan Reid MD;  Location: [facility];  Service: Obstetrics/Gynecology;  Laterality: N/A;         Social History    Tobacco Use      Smoking status: Never      Smokeless tobacco: Never     No current facility-administered medications for this encounter.     Current Outpatient Medications:   •  cyclobenzaprine (FLEXERIL) 5 MG tablet, Take 1 tablet by mouth 3 (Three) Times a Day As Needed for Muscle Spasms., Disp: 20 tablet, Rfl: 0  •  doxylamine (UNISOM) 25 MG tablet, Take 1 tablet by mouth Every Night., Disp: 30 tablet, Rfl: 5  •  famotidine (PEPCID) 20 MG tablet, Take 1 tablet by mouth 2 (Two) Times a Day., Disp: 60 tablet, Rfl: 5  •  Prenatal Vit-Fe Fumarate-FA (PRENATAL VITAMINS) 28-0.8 MG tablet, Take 1 tablet by mouth Daily., Disp: 30 tablet, Rfl: 0     No Known Allergies     Review of Systems   Constitutional: Negative.    HENT: Negative.    Respiratory: Negative.    Cardiovascular: Negative.                Objective      LMP 2022   General: well developed; well nourished  no acute distress   Heart: regular rate and rhythm, S1, S2 normal, no murmur, click, rub or gallop   Lungs: breathing is unlabored  clear to auscultation bilaterally   Abdomen: soft, non-tender; no masses  no umbilical or inguinal hernias are present  no hepato-splenomegaly   Pelvis:: External genitalia:  normal appearance of the external genitalia including Bartholin's and Mascoutah's glands.   Labs        Lab Results   Component Value Date      10/04/2022     HGB 12.9 10/04/2022     HCT 37.5 10/04/2022     WBC 8.82 10/04/2022      2019     K 3.7 2019      2019     CO2 22.4 2019     BUN 9 2019     CREATININE 0.53 (L) 2019     GLUCOSE 84 2019     ALBUMIN 4.00 2019     CALCIUM 9.5  2019     AST 16 2019     ALT 23 2019     BILITOT 0.3 2019               Assessment      1. IUP @39 + weeks  2. Prior C/S  3. Desired permanent sterilization           Plan      1. R C/S, BTL              2. Risks, complications, benefits, and other alternatives discussed.        Electronically signed by Bryan Reid MD at 22 0732         No current facility-administered medications for this encounter.     Current Outpatient Medications   Medication Sig Dispense Refill   • acetaminophen (TYLENOL) 500 MG tablet Take 2 tablets by mouth Every 8 (Eight) Hours As Needed for Mild Pain. 60 tablet 0   • docusate sodium 100 MG capsule Take 1 capsule by mouth 2 (Two) Times a Day As Needed for Constipation. 30 capsule 0   • ibuprofen (ADVIL,MOTRIN) 800 MG tablet Take 1 tablet by mouth Every 8 (Eight) Hours As Needed for Mild Pain. 60 tablet 0   • oxyCODONE (ROXICODONE) 5 MG immediate release tablet Take 1 tablet by mouth Every 4 (Four) Hours As Needed for Moderate Pain for up to 5 days. 15 tablet 0   • Prenatal Vit-Fe Fumarate-FA (PRENATAL VITAMINS) 28-0.8 MG tablet Take 1 tablet by mouth Daily. 30 tablet 0       Physician Progress Notes (last 48 hours)  Notes from 22 0823 through 23 0823   No notes of this type exist for this encounter.          Discharge Summary      Luz Gamez CNM at 23 0946          Discharge Summary     Parish Lance  : 1998  MRN: [id number]  CSN: [id number]    Date of Admission: 2022   Date of Discharge:  2023   Delivering Physician: Bryan Reid MD       Admission Diagnosis: 1. Pregnancy [Z34.90]  2. Pregnancy [Z34.90]   Discharge Diagnosis: 1. Same as above plus  2. Pregnancy at 39w0d - delivered       Procedures: 1. Repeat  (LTCS) with BTL     Hospital Course  See the completed operative report for details regarding antepartum course and delivery.    Her post-operative course was  unremarkable.  On POD # 2 she felt like she was ready for D/C.  She was evaluated and it was determined she was able to be discharged to home.  She had no febrile morbidity. She had normal bowel and bladder function and was hemodynamically stable.  Her wound was healing well without obvious signs of infections. She is breast feeding     Infant  male  fetus weighing 3270 g (7 lb 3.3 oz)   Apgars -  8 @ 1 minute /  9 @ 5 minutes.    Discharge labs  Lab Results   Component Value Date    WBC 7.99 12/31/2022    HGB 11.6 (L) 12/31/2022    HCT 35.3 12/31/2022     (L) 12/31/2022       Discharge Medications     Discharge Medications      New Medications      Instructions Start Date   docusate sodium 100 MG capsule   100 mg, Oral, 2 Times Daily PRN      ibuprofen 800 MG tablet  Commonly known as: ADVIL,MOTRIN   800 mg, Oral, Every 8 Hours PRN      oxyCODONE 5 MG immediate release tablet  Commonly known as: ROXICODONE   5 mg, Oral, Every 4 Hours PRN         Changes to Medications      Instructions Start Date   acetaminophen 500 MG tablet  Commonly known as: TYLENOL  What changed: when to take this   1,000 mg, Oral, Every 8 Hours PRN         Continue These Medications      Instructions Start Date   prenatal vitamin 28-0.8 28-0.8 MG tablet tablet   1 tablet, Oral, Daily             Discharge Disposition Home   Condition on Discharge: good   Follow-up: 1 week with [facility].     Time spent on discharge: 30 minutes or less  ARUNA Pablo  1/1/2023    Electronically signed by Luz Gamez CNM at 01/01/23 0948        ineffective breastfeeding

## 2023-01-04 ENCOUNTER — APPOINTMENT (OUTPATIENT)
Dept: BARIATRICS | Facility: CLINIC | Age: 33
End: 2023-01-04
Payer: COMMERCIAL

## 2023-01-04 VITALS
TEMPERATURE: 96.7 F | OXYGEN SATURATION: 98 % | DIASTOLIC BLOOD PRESSURE: 82 MMHG | BODY MASS INDEX: 50.47 KG/M2 | HEART RATE: 82 BPM | HEIGHT: 62 IN | SYSTOLIC BLOOD PRESSURE: 130 MMHG | WEIGHT: 274.25 LBS

## 2023-01-04 PROCEDURE — 99024 POSTOP FOLLOW-UP VISIT: CPT

## 2023-01-04 RX ORDER — ONDANSETRON 4 MG/1
4 TABLET, ORALLY DISINTEGRATING ORAL EVERY 8 HOURS
Qty: 15 | Refills: 0 | Status: DISCONTINUED | COMMUNITY
Start: 2022-12-11 | End: 2023-01-04

## 2023-01-04 RX ORDER — OXYCODONE 5 MG/1
5 TABLET ORAL
Qty: 6 | Refills: 0 | Status: DISCONTINUED | COMMUNITY
Start: 2022-12-11 | End: 2023-01-04

## 2023-01-04 NOTE — PHYSICAL EXAM
[Obese, well nourished, in no acute distress] : obese, well nourished, in no acute distress [Normal] : affect appropriate [de-identified] : Postoperative instructions have been provided including avoidance of heavy lifting and strenuous activities in excess of 20-25 pounds for duration of 4-6 weeks. The patient may shower keeping the incisions clean, dry, covered as needed.

## 2023-01-04 NOTE — HISTORY OF PRESENT ILLNESS
[de-identified] : Routine post op f/u s/p Lap Sleeve Gastrectomy [Procedure: ___] : Procedure performed: [unfilled]  [Date of Surgery: ___] : Date of Surgery:   [unfilled] [Surgeon Name:   ___] : Surgeon Name: Dr. DANIEL

## 2023-01-04 NOTE — ASSESSMENT
[FreeTextEntry1] : Routine postoperative follow-up 1 week status post laparoscopic sleeve gastrectomy.  The patient can use to do well having lost approximately 15 pounds from her preoperative encounter.  He denies any adverse events or symptoms.  She denies nausea, vomiting, dysphagia, reflux or change in bowel habits.  Is tolerating full liquid diet including her protein shakes and appropriate volume of water.  She has started the daily multivitamin regimen.\par \par Abdominal exam remains benign.  Surgical incisions remain well approximated and healing appropriately without erythema, induration or fluctuance.  No evidence of hernia or masses.\par \par Postoperative instructions have been provided including avoidance of heavy lifting and strenuous activities in excess of 20-25 pounds for duration of 4-6 weeks. The patient may shower keeping the incisions clean, dry, covered as needed.\par \par Nutritional counseling has been provided.  Postoperative dietary protocols have again been reviewed and all questions have been answered.  The patient is encouraged to remain calorie conscious and continue a low fat, low carbohydrate, high protein diet. Also, emphasis has been placed on the importance of adequate hydration, multi-vitamin supplementation and exercise.  (15 min)\par \par Patient is scheduled for a telehealth follow-up with me in approximately 3 weeks.

## 2023-02-02 ENCOUNTER — APPOINTMENT (OUTPATIENT)
Dept: BARIATRICS | Facility: CLINIC | Age: 33
End: 2023-02-02
Payer: COMMERCIAL

## 2023-02-02 VITALS — WEIGHT: 264 LBS | BODY MASS INDEX: 48.58 KG/M2 | HEIGHT: 62 IN

## 2023-02-02 DIAGNOSIS — E66.01 MORBID (SEVERE) OBESITY DUE TO EXCESS CALORIES: ICD-10-CM

## 2023-02-02 PROCEDURE — 99024 POSTOP FOLLOW-UP VISIT: CPT

## 2023-02-02 RX ORDER — OMEPRAZOLE 20 MG/1
20 CAPSULE, DELAYED RELEASE ORAL DAILY
Qty: 30 | Refills: 1 | Status: DISCONTINUED | COMMUNITY
Start: 2022-12-11 | End: 2023-02-02

## 2023-02-02 NOTE — HISTORY OF PRESENT ILLNESS
[Procedure: ___] : Procedure performed: [unfilled]  [Date of Surgery: ___] : Date of Surgery:   [unfilled] [Surgeon Name:   ___] : Surgeon Name: Dr. DANIEL

## 2023-02-02 NOTE — ASSESSMENT
[FreeTextEntry1] : Unremarkable postoperative course.  1 month status post laparoscopic sleeve gastrectomy.  The patient is lost an additional 10 pounds from her previous encounter which equates to approximately 14 pounds since surgery.  She denies any adverse events or symptoms.  Only describes 1 episode of reflux which she attributes to having eaten too quickly.  She finds the protein shakes unpalatable causing her bloat and nausea.  She has however increased her protein intake through chicken and fish.  It appears she remains compliant with postoperative dietary recommendations and multivitamin supplementation.\par \par Physical exam limited by telehealth platform.  Findings documented above as seen on Video interface and by patient accounts.\par \par Nutritional counseling has been provided.  Cleared to advance to regular diet as tolerated.  Encouraged to continue to eat slowly and chew her food thoroughly.  The patient is encouraged to remain calorie conscious and continue a low fat, low carbohydrate, high protein diet. Also, emphasis has been placed on the importance of adequate hydration, multi-vitamin supplementation and exercise.  (15 min)\par \par At this point I have removed any physical restrictions from an activity perspective.  The patient is encouraged to resume a regular scheduled exercise program.\par \par Also cleared to return to work without restrictions as of February 9.  Patient requests a letter to be emailed to her attention.\par \par Follow-up with me in 2 months.

## 2023-02-02 NOTE — PHYSICAL EXAM
[Obese, well nourished, in no acute distress] : obese, well nourished, in no acute distress [Normal] : affect appropriate [de-identified] : Postoperative instructions have been provided including avoidance of heavy lifting and strenuous activities in excess of 20-25 pounds for duration of 4-6 weeks. The patient may shower keeping the incisions clean, dry, covered as needed.

## 2023-02-08 NOTE — OB RN DELIVERY SUMMARY - AMNIOTIC FLUID ODOR, LABOR
Prep Survey    Flowsheet Row Responses   Religion facility patient discharged from? [place]   Is LACE score < 7 ? No   Eligibility Readm Mgmt   Discharge diagnosis syncope due to SVT   Does the patient have one of the following disease processes/diagnoses(primary or secondary)? Other   Does the patient have Home health ordered? No   Is there a DME ordered? Yes   What DME was ordered? [facility]   Prep survey completed? Yes          Isha LAWSON - Registered Nurse         normal

## 2023-02-28 ENCOUNTER — APPOINTMENT (OUTPATIENT)
Dept: BARIATRICS/WEIGHT MGMT | Facility: CLINIC | Age: 33
End: 2023-02-28

## 2023-02-28 ENCOUNTER — APPOINTMENT (OUTPATIENT)
Dept: BARIATRICS/WEIGHT MGMT | Facility: CLINIC | Age: 33
End: 2023-02-28
Payer: COMMERCIAL

## 2023-02-28 PROCEDURE — 97803 MED NUTRITION INDIV SUBSEQ: CPT | Mod: 95

## 2023-03-01 VITALS — HEIGHT: 62 IN | BODY MASS INDEX: 47.66 KG/M2 | WEIGHT: 259 LBS

## 2023-04-01 NOTE — DISCHARGE NOTE OB - NS OB DC IMMUNIZATIONS MMR YN
Pt from home states hx of high bp lifestyle change and it went away. Has not had meds for a long time without issue. Now elevated BP. Pt has pending colonoscopy on Monday and mother had a stroke - pt current concerns./stressors. States headache started yesterday with some dizziness starting Thursday night.
No

## 2023-04-13 ENCOUNTER — APPOINTMENT (OUTPATIENT)
Dept: BARIATRICS | Facility: CLINIC | Age: 33
End: 2023-04-13
Payer: COMMERCIAL

## 2023-04-13 VITALS
HEART RATE: 80 BPM | BODY MASS INDEX: 45.92 KG/M2 | OXYGEN SATURATION: 99 % | SYSTOLIC BLOOD PRESSURE: 120 MMHG | HEIGHT: 62 IN | TEMPERATURE: 96.6 F | DIASTOLIC BLOOD PRESSURE: 80 MMHG | WEIGHT: 249.56 LBS

## 2023-04-13 PROCEDURE — 99213 OFFICE O/P EST LOW 20 MIN: CPT

## 2023-04-19 NOTE — PHYSICAL EXAM
[Obese, well nourished, in no acute distress] : obese, well nourished, in no acute distress [Normal] : affect appropriate [de-identified] : Postoperative instructions have been provided including avoidance of heavy lifting and strenuous activities in excess of 20-25 pounds for duration of 4-6 weeks. The patient may shower keeping the incisions clean, dry, covered as needed.

## 2023-04-19 NOTE — HISTORY OF PRESENT ILLNESS
[de-identified] : Approx 4 months s/p Lap Sleeve Gastrectomy.\par Continues to do well with weight loss. [Procedure: ___] : Procedure performed: [unfilled]  [Date of Surgery: ___] : Date of Surgery:   [unfilled] [Surgeon Name:   ___] : Surgeon Name: Dr. DANIEL

## 2023-04-19 NOTE — ASSESSMENT
[FreeTextEntry1] : Unremarkable post operative course s/p Lap Sleeve Gastrectomy on 12/29/22.\par Continues to make appropriate dietary choices, activity and exercise.\par Reports compliance with daily MVI supplementation.\par No complaints.\par \par Nutritional and weight loss counseling has been provided. The importance of weight reduction in the treatment of Obesity and its contribution to resolution of obesity related comorbidities has been discussed.  The patient is encouraged to remain calorie conscious and continue a low fat, low carbohydrate, high protein diet. Eat slowly and chew food well.  Avoid eating and drinking simultaneously.  Also, emphasis has been placed on the importance of adequate hydration, multi-vitamin supplementation and exercise.  (15 min)\par \par f/u in June for 6 months post op f/u\par \par

## 2023-05-09 NOTE — ED ADULT TRIAGE NOTE - TEMPERATURE IN CELSIUS (DEGREES C)
Pt feel in workqueue. Had a surgery with [facility], and there is not a hospital f/u scheduled. Please call pt with appt date/time if appt is needed.    36.7

## 2023-05-17 ENCOUNTER — NON-APPOINTMENT (OUTPATIENT)
Age: 33
End: 2023-05-17

## 2023-07-08 NOTE — ASU PATIENT PROFILE, ADULT - TEACHING/LEARNING LEARNING PREFERENCES
Patient reports his Humalog pen has malfunctions.  His current glucose level is 309.  I have called in 1 refill of Humalog to the patient's pharmacy; however, have informed nurse triage to inform the patient that if his glucose levels continue to remain above 300 or if he begins to experience symptoms such as blurry vision, chest pain, weakness, nausea, or vomiting, he should proceed to the emergency room or urgent care for further evaluation and treatment.    
verbal instruction/written material

## 2023-07-26 NOTE — H&P PST ADULT - ENMT
Detail Level: Simple Add 11393 Cpt? (Important Note: In 2017 The Use Of 25772 Is Being Tracked By Cms To Determine Future Global Period Reimbursement For Global Periods): yes details…

## 2023-08-28 NOTE — OB PROVIDER DELIVERY SUMMARY - NSVAGINALEXAMCERT_OBGYN_ALL_OB
The Delivery OB Provider certifies that vaginal examination and/or abdominal examination after the delivery was done and no foreign body was found. Detail Level: Detailed Add 1585x Cpt? (Do Not Bill If You Billed For The Procedure Placing The Sutures. This Is An Add-On Code That Must Be Billed With An E/M Visit Code): No

## 2024-01-10 ENCOUNTER — APPOINTMENT (OUTPATIENT)
Dept: INTERNAL MEDICINE | Facility: CLINIC | Age: 34
End: 2024-01-10
Payer: COMMERCIAL

## 2024-01-10 VITALS
TEMPERATURE: 98 F | BODY MASS INDEX: 43.71 KG/M2 | OXYGEN SATURATION: 99 % | WEIGHT: 239 LBS | SYSTOLIC BLOOD PRESSURE: 143 MMHG | DIASTOLIC BLOOD PRESSURE: 82 MMHG | HEART RATE: 82 BPM

## 2024-01-10 DIAGNOSIS — R06.02 SHORTNESS OF BREATH: ICD-10-CM

## 2024-01-10 DIAGNOSIS — F41.9 ANXIETY DISORDER, UNSPECIFIED: ICD-10-CM

## 2024-01-10 DIAGNOSIS — Z01.818 ENCOUNTER FOR OTHER PREPROCEDURAL EXAMINATION: ICD-10-CM

## 2024-01-10 DIAGNOSIS — Z87.898 PERSONAL HISTORY OF OTHER SPECIFIED CONDITIONS: ICD-10-CM

## 2024-01-10 DIAGNOSIS — E66.01 MORBID (SEVERE) OBESITY DUE TO EXCESS CALORIES: ICD-10-CM

## 2024-01-10 DIAGNOSIS — R10.2 PELVIC AND PERINEAL PAIN: ICD-10-CM

## 2024-01-10 DIAGNOSIS — Z00.00 ENCOUNTER FOR GENERAL ADULT MEDICAL EXAMINATION W/OUT ABNORMAL FINDINGS: ICD-10-CM

## 2024-01-10 DIAGNOSIS — Z86.2 PERSONAL HISTORY OF DISEASES OF THE BLOOD AND BLOOD-FORMING ORGANS AND CERTAIN DISORDERS INVOLVING THE IMMUNE MECHANISM: ICD-10-CM

## 2024-01-10 PROCEDURE — 36415 COLL VENOUS BLD VENIPUNCTURE: CPT

## 2024-01-10 PROCEDURE — 99395 PREV VISIT EST AGE 18-39: CPT | Mod: 25

## 2024-01-10 PROCEDURE — G0444 DEPRESSION SCREEN ANNUAL: CPT | Mod: 59

## 2024-01-10 NOTE — HISTORY OF PRESENT ILLNESS
[de-identified] : 34 y/o female patient presents today for annual CPE/fasting labs - reviewed age appropriate preventive screening exams - reviewed and updated PMH/Medications/Allergies/Surgical hx

## 2024-01-10 NOTE — HEALTH RISK ASSESSMENT
[Yes] : Yes [No] : In the past 12 months have you used drugs other than those required for medical reasons? No [0] : 2) Feeling down, depressed, or hopeless: Not at all (0) [PHQ-2 Negative - No further assessment needed] : PHQ-2 Negative - No further assessment needed [I have developed a follow-up plan documented below in the note.] : I have developed a follow-up plan documented below in the note. [[id number]] : 0 [Patient reported PAP Smear was normal] : Patient reported PAP Smear was normal [Change in mental status noted] : No change in mental status noted [None] : None [With Family] : lives with family [Employed] : employed [Sexually Active] : sexually active [High Risk Behavior] : no high risk behavior [Feels Safe at Home] : Feels safe at home [Reports changes in hearing] : Reports no changes in hearing [Reports changes in vision] : Reports no changes in vision [Reports changes in dental health] : Reports no changes in dental health [PapSmearDate] : 4/2021 [Never] : Never

## 2024-01-11 LAB
25(OH)D3 SERPL-MCNC: 7.6 NG/ML
ALBUMIN SERPL ELPH-MCNC: 4.3 G/DL
ALP BLD-CCNC: 61 U/L
ALT SERPL-CCNC: 11 U/L
ANION GAP SERPL CALC-SCNC: 13 MMOL/L
APPEARANCE: CLEAR
AST SERPL-CCNC: 16 U/L
BACTERIA: ABNORMAL /HPF
BASOPHILS # BLD AUTO: 0.04 K/UL
BASOPHILS NFR BLD AUTO: 0.5 %
BILIRUB SERPL-MCNC: 0.4 MG/DL
BILIRUBIN URINE: NEGATIVE
BLOOD URINE: ABNORMAL
BUN SERPL-MCNC: 10 MG/DL
CALCIUM SERPL-MCNC: 9.5 MG/DL
CAST: 0 /LPF
CHLORIDE SERPL-SCNC: 103 MMOL/L
CHOLEST SERPL-MCNC: 186 MG/DL
CO2 SERPL-SCNC: 21 MMOL/L
COLOR: YELLOW
CREAT SERPL-MCNC: 0.67 MG/DL
EGFR: 117 ML/MIN/1.73M2
EOSINOPHIL # BLD AUTO: 0.04 K/UL
EOSINOPHIL NFR BLD AUTO: 0.5 %
EPITHELIAL CELLS: 2 /HPF
ESTIMATED AVERAGE GLUCOSE: 108 MG/DL
FERRITIN SERPL-MCNC: 27 NG/ML
FOLATE SERPL-MCNC: 9 NG/ML
GLUCOSE QUALITATIVE U: NEGATIVE MG/DL
GLUCOSE SERPL-MCNC: 178 MG/DL
HBA1C MFR BLD HPLC: 5.4 %
HCT VFR BLD CALC: 41.3 %
HDLC SERPL-MCNC: 45 MG/DL
HGB BLD-MCNC: 13.8 G/DL
IMM GRANULOCYTES NFR BLD AUTO: 0.1 %
IRON SATN MFR SERPL: 20 %
IRON SERPL-MCNC: 75 UG/DL
KETONES URINE: NEGATIVE MG/DL
LDLC SERPL CALC-MCNC: 110 MG/DL
LEUKOCYTE ESTERASE URINE: ABNORMAL
LYMPHOCYTES # BLD AUTO: 2.65 K/UL
LYMPHOCYTES NFR BLD AUTO: 32.4 %
MAGNESIUM SERPL-MCNC: 2 MG/DL
MAN DIFF?: NORMAL
MCHC RBC-ENTMCNC: 29.6 PG
MCHC RBC-ENTMCNC: 33.4 GM/DL
MCV RBC AUTO: 88.6 FL
MICROSCOPIC-UA: NORMAL
MONOCYTES # BLD AUTO: 0.32 K/UL
MONOCYTES NFR BLD AUTO: 3.9 %
NEUTROPHILS # BLD AUTO: 5.12 K/UL
NEUTROPHILS NFR BLD AUTO: 62.6 %
NITRITE URINE: NEGATIVE
NONHDLC SERPL-MCNC: 141 MG/DL
PH URINE: 7
PLATELET # BLD AUTO: 361 K/UL
POTASSIUM SERPL-SCNC: 4 MMOL/L
PROT SERPL-MCNC: 7.1 G/DL
PROTEIN URINE: NORMAL MG/DL
RBC # BLD: 4.66 M/UL
RBC # FLD: 13 %
RED BLOOD CELLS URINE: 2 /HPF
SODIUM SERPL-SCNC: 137 MMOL/L
SPECIFIC GRAVITY URINE: 1.02
TIBC SERPL-MCNC: 373 UG/DL
TRIGL SERPL-MCNC: 176 MG/DL
TSH SERPL-ACNC: 0.89 UIU/ML
UIBC SERPL-MCNC: 298 UG/DL
UROBILINOGEN URINE: 0.2 MG/DL
VIT B12 SERPL-MCNC: 571 PG/ML
WBC # FLD AUTO: 8.18 K/UL
WHITE BLOOD CELLS URINE: 1 /HPF

## 2024-01-18 ENCOUNTER — APPOINTMENT (OUTPATIENT)
Dept: BARIATRICS/WEIGHT MGMT | Facility: CLINIC | Age: 34
End: 2024-01-18
Payer: COMMERCIAL

## 2024-01-18 VITALS — WEIGHT: 238 LBS | BODY MASS INDEX: 43.79 KG/M2 | HEIGHT: 62 IN

## 2024-01-18 PROCEDURE — 97803 MED NUTRITION INDIV SUBSEQ: CPT

## 2024-01-25 ENCOUNTER — APPOINTMENT (OUTPATIENT)
Dept: BARIATRICS | Facility: CLINIC | Age: 34
End: 2024-01-25
Payer: COMMERCIAL

## 2024-01-25 VITALS
HEART RATE: 71 BPM | SYSTOLIC BLOOD PRESSURE: 140 MMHG | WEIGHT: 242 LBS | OXYGEN SATURATION: 99 % | HEIGHT: 62 IN | DIASTOLIC BLOOD PRESSURE: 76 MMHG | BODY MASS INDEX: 44.53 KG/M2

## 2024-01-25 DIAGNOSIS — G47.33 OBSTRUCTIVE SLEEP APNEA (ADULT) (PEDIATRIC): ICD-10-CM

## 2024-01-25 DIAGNOSIS — E66.01 MORBID (SEVERE) OBESITY DUE TO EXCESS CALORIES: ICD-10-CM

## 2024-01-25 DIAGNOSIS — Z98.84 BARIATRIC SURGERY STATUS: ICD-10-CM

## 2024-01-25 DIAGNOSIS — R73.03 PREDIABETES.: ICD-10-CM

## 2024-01-25 DIAGNOSIS — E78.5 HYPERLIPIDEMIA, UNSPECIFIED: ICD-10-CM

## 2024-01-25 DIAGNOSIS — E55.9 VITAMIN D DEFICIENCY, UNSPECIFIED: ICD-10-CM

## 2024-01-25 PROCEDURE — 99214 OFFICE O/P EST MOD 30 MIN: CPT

## 2024-01-25 RX ORDER — FOLIC ACID 20 MG
CAPSULE ORAL DAILY
Refills: 0 | Status: ACTIVE | COMMUNITY

## 2024-03-14 ENCOUNTER — APPOINTMENT (OUTPATIENT)
Dept: BARIATRICS/WEIGHT MGMT | Facility: CLINIC | Age: 34
End: 2024-03-14

## 2024-03-16 NOTE — DISCHARGE NOTE NURSING/CASE MANAGEMENT/SOCIAL WORK - CAREGIVER NAME
Nurse Triage SBAR    Is this a 2nd Level Triage? No    Situation/Background: Patient is calling regarding medication prescription for percocet. Patient is on a pain contract, states it was her responsibility to send call the MD. Patient states the pharmacy is requesting that Dr. Soto call the pharmacy that it is okay to fill the Rx.    Assessment: No triage.     Recommendation: Per disposition, Call during office hours. Patient is advised that patient request from 3/15/24 is in the EMR. Reviewed Care Advice with parent and verbalizes understanding. Declines additional questions. Advised patient to call back with any new or worsening symptoms. Patient verbalized understanding and agrees with plan.     Routed encounter to PCP Care Team at patient request.     Protocol Recommended Disposition: Telephone advice    Maty Mcdermott RN on 3/16/2024 at 4:44 PM  [facility] Nurse Advisors  Reason for Disposition    Caller requesting a CONTROLLED substance prescription refill (e.g., narcotics, ADHD medicines)    Protocols used: Medication Refill and Renewal Call-A-     Marnie slaughter

## 2024-06-27 ENCOUNTER — APPOINTMENT (OUTPATIENT)
Dept: BARIATRICS | Facility: CLINIC | Age: 34
End: 2024-06-27

## 2024-09-05 ENCOUNTER — NON-APPOINTMENT (OUTPATIENT)
Age: 34
End: 2024-09-05

## 2024-09-13 ENCOUNTER — EMERGENCY (EMERGENCY)
Facility: HOSPITAL | Age: 34
LOS: 1 days | Discharge: HOME | End: 2024-09-13
Attending: EMERGENCY MEDICINE | Admitting: EMERGENCY MEDICINE
Payer: COMMERCIAL

## 2024-09-13 VITALS
DIASTOLIC BLOOD PRESSURE: 90 MMHG | SYSTOLIC BLOOD PRESSURE: 150 MMHG | WEIGHT: 229.94 LBS | RESPIRATION RATE: 18 BRPM | TEMPERATURE: 98 F | OXYGEN SATURATION: 99 % | HEART RATE: 76 BPM

## 2024-09-13 DIAGNOSIS — Z98.890 OTHER SPECIFIED POSTPROCEDURAL STATES: Chronic | ICD-10-CM

## 2024-09-13 DIAGNOSIS — Z98.891 HISTORY OF UTERINE SCAR FROM PREVIOUS SURGERY: Chronic | ICD-10-CM

## 2024-09-13 LAB
ALBUMIN SERPL ELPH-MCNC: 3.9 G/DL — SIGNIFICANT CHANGE UP (ref 3.3–5)
ALP SERPL-CCNC: 45 U/L — SIGNIFICANT CHANGE UP (ref 40–120)
ALT FLD-CCNC: 11 U/L — SIGNIFICANT CHANGE UP (ref 4–33)
ANION GAP SERPL CALC-SCNC: 13 MMOL/L — SIGNIFICANT CHANGE UP (ref 7–14)
APPEARANCE UR: CLEAR — SIGNIFICANT CHANGE UP
AST SERPL-CCNC: 20 U/L — SIGNIFICANT CHANGE UP (ref 4–32)
BACTERIA # UR AUTO: ABNORMAL /HPF
BASOPHILS # BLD AUTO: 0.03 K/UL — SIGNIFICANT CHANGE UP (ref 0–0.2)
BASOPHILS NFR BLD AUTO: 0.4 % — SIGNIFICANT CHANGE UP (ref 0–2)
BILIRUB SERPL-MCNC: 0.5 MG/DL — SIGNIFICANT CHANGE UP (ref 0.2–1.2)
BILIRUB UR-MCNC: NEGATIVE — SIGNIFICANT CHANGE UP
BUN SERPL-MCNC: 9 MG/DL — SIGNIFICANT CHANGE UP (ref 7–23)
CALCIUM SERPL-MCNC: 8.9 MG/DL — SIGNIFICANT CHANGE UP (ref 8.4–10.5)
CAST: 0 /LPF — SIGNIFICANT CHANGE UP (ref 0–4)
CHLORIDE SERPL-SCNC: 102 MMOL/L — SIGNIFICANT CHANGE UP (ref 98–107)
CO2 SERPL-SCNC: 19 MMOL/L — LOW (ref 22–31)
COLOR SPEC: ABNORMAL
CREAT SERPL-MCNC: 0.72 MG/DL — SIGNIFICANT CHANGE UP (ref 0.5–1.3)
DIFF PNL FLD: ABNORMAL
EGFR: 112 ML/MIN/1.73M2 — SIGNIFICANT CHANGE UP
EOSINOPHIL # BLD AUTO: 0.02 K/UL — SIGNIFICANT CHANGE UP (ref 0–0.5)
EOSINOPHIL NFR BLD AUTO: 0.3 % — SIGNIFICANT CHANGE UP (ref 0–6)
GLUCOSE SERPL-MCNC: 99 MG/DL — SIGNIFICANT CHANGE UP (ref 70–99)
GLUCOSE UR QL: NEGATIVE MG/DL — SIGNIFICANT CHANGE UP
HCT VFR BLD CALC: 41.1 % — SIGNIFICANT CHANGE UP (ref 34.5–45)
HGB BLD-MCNC: 13.3 G/DL — SIGNIFICANT CHANGE UP (ref 11.5–15.5)
IANC: 5.42 K/UL — SIGNIFICANT CHANGE UP (ref 1.8–7.4)
IMM GRANULOCYTES NFR BLD AUTO: 0.3 % — SIGNIFICANT CHANGE UP (ref 0–0.9)
KETONES UR-MCNC: NEGATIVE MG/DL — SIGNIFICANT CHANGE UP
LEUKOCYTE ESTERASE UR-ACNC: ABNORMAL
LIDOCAIN IGE QN: 23 U/L — SIGNIFICANT CHANGE UP (ref 7–60)
LYMPHOCYTES # BLD AUTO: 1.82 K/UL — SIGNIFICANT CHANGE UP (ref 1–3.3)
LYMPHOCYTES # BLD AUTO: 23.6 % — SIGNIFICANT CHANGE UP (ref 13–44)
MCHC RBC-ENTMCNC: 29.9 PG — SIGNIFICANT CHANGE UP (ref 27–34)
MCHC RBC-ENTMCNC: 32.4 GM/DL — SIGNIFICANT CHANGE UP (ref 32–36)
MCV RBC AUTO: 92.4 FL — SIGNIFICANT CHANGE UP (ref 80–100)
MONOCYTES # BLD AUTO: 0.39 K/UL — SIGNIFICANT CHANGE UP (ref 0–0.9)
MONOCYTES NFR BLD AUTO: 5.1 % — SIGNIFICANT CHANGE UP (ref 2–14)
NEUTROPHILS # BLD AUTO: 5.42 K/UL — SIGNIFICANT CHANGE UP (ref 1.8–7.4)
NEUTROPHILS NFR BLD AUTO: 70.3 % — SIGNIFICANT CHANGE UP (ref 43–77)
NITRITE UR-MCNC: NEGATIVE — SIGNIFICANT CHANGE UP
NRBC # BLD: 0 /100 WBCS — SIGNIFICANT CHANGE UP (ref 0–0)
NRBC # FLD: 0 K/UL — SIGNIFICANT CHANGE UP (ref 0–0)
PH UR: 7.5 — SIGNIFICANT CHANGE UP (ref 5–8)
PLATELET # BLD AUTO: 344 K/UL — SIGNIFICANT CHANGE UP (ref 150–400)
POTASSIUM SERPL-MCNC: 4.4 MMOL/L — SIGNIFICANT CHANGE UP (ref 3.5–5.3)
POTASSIUM SERPL-SCNC: 4.4 MMOL/L — SIGNIFICANT CHANGE UP (ref 3.5–5.3)
PROT SERPL-MCNC: 6.9 G/DL — SIGNIFICANT CHANGE UP (ref 6–8.3)
PROT UR-MCNC: SIGNIFICANT CHANGE UP MG/DL
RBC # BLD: 4.45 M/UL — SIGNIFICANT CHANGE UP (ref 3.8–5.2)
RBC # FLD: 13.2 % — SIGNIFICANT CHANGE UP (ref 10.3–14.5)
RBC CASTS # UR COMP ASSIST: 244 /HPF — HIGH (ref 0–4)
SODIUM SERPL-SCNC: 134 MMOL/L — LOW (ref 135–145)
SP GR SPEC: 1.01 — SIGNIFICANT CHANGE UP (ref 1–1.03)
SQUAMOUS # UR AUTO: 5 /HPF — SIGNIFICANT CHANGE UP (ref 0–5)
TROPONIN T, HIGH SENSITIVITY RESULT: <6 NG/L — SIGNIFICANT CHANGE UP
UROBILINOGEN FLD QL: 0.2 MG/DL — SIGNIFICANT CHANGE UP (ref 0.2–1)
WBC # BLD: 7.7 K/UL — SIGNIFICANT CHANGE UP (ref 3.8–10.5)
WBC # FLD AUTO: 7.7 K/UL — SIGNIFICANT CHANGE UP (ref 3.8–10.5)
WBC UR QL: 3 /HPF — SIGNIFICANT CHANGE UP (ref 0–5)

## 2024-09-13 PROCEDURE — 99053 MED SERV 10PM-8AM 24 HR FAC: CPT

## 2024-09-13 PROCEDURE — 74177 CT ABD & PELVIS W/CONTRAST: CPT | Mod: 26,MC

## 2024-09-13 PROCEDURE — 71045 X-RAY EXAM CHEST 1 VIEW: CPT | Mod: 26

## 2024-09-13 PROCEDURE — 93010 ELECTROCARDIOGRAM REPORT: CPT

## 2024-09-13 PROCEDURE — 99285 EMERGENCY DEPT VISIT HI MDM: CPT

## 2024-09-13 PROCEDURE — 71275 CT ANGIOGRAPHY CHEST: CPT | Mod: 26,MC

## 2024-09-13 NOTE — ED PROVIDER NOTE - OBJECTIVE STATEMENT
33 yr f with PMH of gastric sleeve 2022, presents due to abdominal pain and chest pain. Abdominal pain started one week ago, has been the same, worse or better with nothing, non radiating, 7/10. She had a loose bm this morning. She vomited described as a tan color in the past day and one week ago as well. Her chest pain started this morning, has been getting worse, worse with deep breathing, better with nothing.   Admits to nausea, vomiting, shortness of breath denies fever, chills

## 2024-09-13 NOTE — ED PROVIDER NOTE - PATIENT PORTAL LINK FT
You can access the FollowMyHealth Patient Portal offered by [facility] by registering at the following website: [email or web address]. By joining [facility] FollowMyHealth portal, you will also be able to view your health information using other applications (apps) compatible with our system.

## 2024-09-13 NOTE — ED PROVIDER NOTE - NSFOLLOWUPINSTRUCTIONS_ED_ALL_ED_FT
Acute Pain, Adult  Acute pain is a type of sudden pain that may last for just a few days or for as long as three months. It is often related to an illness, injury, or a medical procedure. Acute pain may be mild, moderate, or severe.    Pain can make it hard for you to do your daily activities. It can cause anxiety and lead to other problems if it is not treated. Treatment may not take all the pain away, but it may lessen the pain so you can move around and tolerate it.    Pain is best treated with medicines and other therapies such as distraction, meditation, oils from plants (aromatherapy), heat, and ice. Treatment depends on the cause of the pain and how severe it is. Acute pain usually goes away once your injury has healed or you are no longer ill.    Follow these instructions at home:  Medicines    A prescription pill bottle with an example of a pill.  Take over-the-counter and prescription medicines only as told by your health care provider.  Take the lowest dose of medicine for the shortest amount of time needed to relieve the pain.  If you are taking prescription pain medicine:  Do not stop taking the medicine suddenly. Talk to your health care provider about how and when to stop taking prescription medicine.  Do not take more pills than told by your health care provider even if your pain is severe.  Do not take other over-the-counter pain medicines in addition to prescription pain medicine unless told by your health care provider.  Keep your medicine in a safe place, away from children or anyone who could use it in a way that it was not prescribed.  Ask your health care provider if the medicine prescribed to you requires you to avoid driving or using machinery.  Managing pain, stiffness, and swelling    A bag of ice on a towel on the skin.  A heating pad for use on the painful area.   If told, put ice on the affected area.  Put ice in a plastic bag.  Place a towel between your skin and the bag.  Leave the ice on for 20 minutes, 2–3 times a day.  If told, apply heat to the affected area as often as told by your health care provider. Use the heat source that your health care provider recommends, such as a moist heat pack or a heating pad.  Place a towel between your skin and the heat source.  Leave the heat on for 20–30 minutes.  If your skin turns bright red, remove the ice or heat right away to prevent skin damage. The risk of damage is higher if you cannot feel pain, heat, or cold.  Managing constipation    Your medicines may cause constipation. To prevent or treat constipation, you may need to:  Drink enough fluid to keep your urine pale yellow.  Take over-the-counter or prescription medicines.  Eat foods that are high in fiber, such as beans, whole grains, and fresh fruits and vegetables.  Limit foods that are high in fat and processed sugars, such as fried or sweet foods.  Activity    Rest as told by your health care provider.  Return to your normal activities as told by your health care provider. Ask your health care provider what activities are safe for you.  Ask your health care provider if doing physical therapy exercises to improve movement and strength can help you manage your pain.  General instructions    Check your pain level as told by your health care provider.  Ask your health care provider if distraction, relaxation, or aromatherapy can help you manage your pain.  Keep all follow-up visits. Your health care provider will monitor your pain level.  Contact a health care provider if:  Your pain is not controlled by medicine.  Your pain does not improve or gets worse.  You have side effects from pain medicines.  Get help right away if:  You have severe pain.  You have trouble breathing.  You faint, or another person sees you faint.  You have chest pain or pressure that lasts for more than a few minutes, or if you have other symptoms along with chest pain, including:  Pain or discomfort in one or both arms, your back, neck, jaw, or stomach.  Shortness of breath.  A cold sweat.  Nausea.  Feeling light-headed.  These symptoms may be an emergency. Get help right away. Call 911.  Do not wait to see if the symptoms will go away.  Do not drive yourself to the hospital.  This information is not intended to replace advice given to you by your health care provider. Make sure you discuss any questions you have with your health care provider.    Document Revised: 07/12/2023 Document Reviewed: 07/12/2023  [employer] Patient Education © 2024 [employer]  [employer] logo  Terms and Conditions  Privacy Policy  Editorial Policy  All content on this site: Copyright © 2024 [employer], its licensors, and contributors. All rights are reserved, including those for text and data mining, AI training, and similar technologies. For all open access content, the Creative Commons licensing terms apply.  Cookies are used by this site. To decline or learn more, visit our Cookies page.

## 2024-09-13 NOTE — ED ADULT NURSE NOTE - OBJECTIVE STATEMENT
Pt received to intake 6, A&O x 4, ambulatory, pmhx gastric sleeve, coming to ED with complaint of abdominal pain. Pt reports 1 week of epigastric discomfort, associated withy nausea, multiple episodes of vomiting, and diarrhea. Pt also endorsing chest pain, SOB, and headache. Labs drawn and sent, 20G IV to the L AC, RR equal and unlabored, XR at bedside, safety maintained, comfort provided, awaiting results.

## 2024-09-13 NOTE — ED ADULT TRIAGE NOTE - CHIEF COMPLAINT QUOTE
Pt c/o generalized abd discomfort x1 week and chest pain/SOB that began this morning. Denies fevers/chills. Hx gastric sleeve. Well appearing

## 2024-09-13 NOTE — ED PROVIDER NOTE - NS ED MD DISPO DISCHARGE
Contacted pt, advised that EHR has approved increase in [employer] and was sent to [facility]. Pt verbalized understanding. BJP    ----- Message from Toyin Thomason sent at 8/17/2023 10:19 AM CDT -----  Patient is requesting a call back at [phone].                Thanks  odilia    
Home

## 2024-09-13 NOTE — ED PROVIDER NOTE - CLINICAL SUMMARY MEDICAL DECISION MAKING FREE TEXT BOX
33 yr f with PMH of gastric sleeve 2022, presents due to abdominal pain and chest pain. Abdominal pain started one week ago, has been the same, worse or better with nothing, non radiating, 7/10, points to lower abdomen on both sides. She had a loose bm this morning. She vomited described as a tan color in the past day and one week ago as well. Her chest pain started this morning, has been getting worse, worse with deep breathing, better with nothing.   Admits to nausea, vomiting, shortness of breath denies fever, chills   PE: EOMI, lungs cta, heart no mrg, reproducible sternal tenderness, epigastric tenderness   ddx; viral gastro, sbo, gi perf. acs, costochrondritis   Plan: CBC, CMP CT AP w con, Ekg, trop cxr  ACS is on my ddx but lower on my ddx bc her chest pain is reproducible,  we will call general surgery because of her bariatric hx 33 yr f with PMH of gastric sleeve 2022, presents due to abdominal pain and chest pain. Abdominal pain started one week ago, has been the same, worse or better with nothing, non radiating, 7/10, points to lower abdomen on both sides. She had a loose bm this morning. She vomited described as a tan color in the past day and one week ago as well. Her chest pain started this morning, has been getting worse, worse with deep breathing, better with nothing.   Admits to nausea, vomiting, shortness of breath denies fever, chills   PE: EOMI, lungs cta, heart no mrg, reproducible sternal tenderness, epigastric tenderness   ddx; viral gastro, sbo, gi perf. acs, costochrondritis   Plan: CBC, CMP CT AP w con, Ekg, trop cxr  ACS is on my ddx but lower on my ddx bc her chest pain is reproducible,  we will call general surgery because of her bariatric hx  KAYLA; Pt unable to stay for results due to childcare issues.  Have given pat my kevin  and verified her phone number and will call her with results when back.

## 2024-10-11 ENCOUNTER — APPOINTMENT (OUTPATIENT)
Dept: OBGYN | Facility: CLINIC | Age: 34
End: 2024-10-11

## 2024-11-12 ENCOUNTER — APPOINTMENT (OUTPATIENT)
Dept: INTERNAL MEDICINE | Facility: CLINIC | Age: 34
End: 2024-11-12

## 2025-01-09 ENCOUNTER — APPOINTMENT (OUTPATIENT)
Dept: BARIATRICS | Facility: CLINIC | Age: 35
End: 2025-01-09

## 2025-01-15 ENCOUNTER — APPOINTMENT (OUTPATIENT)
Dept: INTERNAL MEDICINE | Facility: CLINIC | Age: 35
End: 2025-01-15

## 2025-07-02 PROBLEM — A53.0 POSITIVE RPR TEST: Status: RESOLVED | Noted: 2020-01-06 | Resolved: 2025-07-02

## 2025-09-05 ENCOUNTER — APPOINTMENT (OUTPATIENT)
Dept: OBGYN | Facility: CLINIC | Age: 35
End: 2025-09-05

## (undated) DEVICE — TUBING SUCTION CONN 6FT STERILE

## (undated) DEVICE — VENODYNE/SCD SLEEVE CALF MEDIUM

## (undated) DEVICE — DRAPE TOWEL BLUE 17" X 24"

## (undated) DEVICE — TUBING PLUME AWAY 4.0

## (undated) DEVICE — BITE BLOCK ADULT 20 X 27MM (GREEN)

## (undated) DEVICE — PACK IV START WITH CHG

## (undated) DEVICE — SUCTION YANKAUER NO CONTROL VENT

## (undated) DEVICE — BIOPSY FORCEP RADIAL JAW 4 STANDARD WITH NEEDLE

## (undated) DEVICE — TROCAR COVIDIEN ENDO CLOSE SUTURING DEVICE

## (undated) DEVICE — SUT POLYSORB 0 30" GU-46

## (undated) DEVICE — SOL IRR POUR NS 0.9% 1000ML

## (undated) DEVICE — SYR ALLIANCE II INFLATION 60ML

## (undated) DEVICE — STAPLER COVIDIEN ENDO GIA XL HANDLE

## (undated) DEVICE — SUT ENDOSTITCH DEVICE 10MM

## (undated) DEVICE — INSUFFLATION NDL COVIDIEN SURGINEEDLE VERESS 120MM

## (undated) DEVICE — SUT MAXON 4-0 30" P-12

## (undated) DEVICE — ENDOCATCH II 15MM

## (undated) DEVICE — SUT POLYSORB 3-0 30" V-20 UNDYED

## (undated) DEVICE — CATH IV SAFE BC 22G X 1" (BLUE)

## (undated) DEVICE — CATH IV SAFE BC 20G X 1.16" (PINK)

## (undated) DEVICE — BALLOON US ENDO

## (undated) DEVICE — NDL HYPO SAFE 22G X 1.5" (BLACK)

## (undated) DEVICE — ELCTR BOVIE TIP BLADE INSULATED 2.75" EDGE

## (undated) DEVICE — SHEARS COVIDIEN ENDO SHEAR 5MM X 45CM LONG W MONOPOLAR CAUTERY

## (undated) DEVICE — SYR LUER LOK 10CC

## (undated) DEVICE — GLV 7 PROTEXIS (WHITE)

## (undated) DEVICE — FOLEY HOLDER STATLOCK 2 WAY ADULT

## (undated) DEVICE — TUBING SUCTION 20FT

## (undated) DEVICE — DRAPE 3/4 SHEET 52X76"

## (undated) DEVICE — SENSOR O2 FINGER ADULT

## (undated) DEVICE — D HELP - CLEARVIEW CLEARIFY SYSTEM

## (undated) DEVICE — TROCAR ETHICON ENDOPATH XCEL BLADELESS 15MM X 100MM STABILITY

## (undated) DEVICE — TUBING STRYKEFLOW II SUCTION / IRRIGATOR

## (undated) DEVICE — BLADE SCALPEL SAFETYLOCK #15

## (undated) DEVICE — CATH ELECHMSTAT  INJ 7FR 210CM

## (undated) DEVICE — LIGASURE L-HOOK 44CM

## (undated) DEVICE — SUT ENDOSTITCH SOFSILK 0 48" ES-9

## (undated) DEVICE — SHEARS COVIDIEN ENDO SHEAR 5MM X 31CM W UNIPOLAR CAUTERY

## (undated) DEVICE — PROBE FIAPC DIA 2.3MM/7FR LNTH 220CM/7.2FT

## (undated) DEVICE — FOLEY TRAY 14FR 5CC LF BAG CLOSED

## (undated) DEVICE — TUBING STRYKER PNEUMOSURE HI FLOW RTP

## (undated) DEVICE — TUBING STRYKER PNEUMOCLEAR HEAT HUMID

## (undated) DEVICE — SOL INJ NS 0.9% 500ML 2 PORT

## (undated) DEVICE — TROCAR ETHICON ENDOPATH XCEL UNIVERSAL SLEEVE WITH OPTIVIEW 5MM X 100MM

## (undated) DEVICE — SOL IRR POUR H2O 1000ML

## (undated) DEVICE — TUBING IV SET GRAVITY 3Y 100" MACRO

## (undated) DEVICE — GLV 7.5 PROTEXIS (WHITE)

## (undated) DEVICE — PACK GENERAL LAPAROSCOPY

## (undated) DEVICE — TROCAR ETHICON ENDOPATH XCEL BLADELESS 5MM X 100MM STABILITY

## (undated) DEVICE — APPLICATOR VISTASEAL LAP DUAL 45CM FLEX

## (undated) DEVICE — TROCAR ETHICON ENDOPATH XCEL BLADELESS 12MM X 100MM STABILITY

## (undated) DEVICE — SOL INJ NS 0.9% 1000ML

## (undated) DEVICE — WARMING BLANKET FULL UNDERBODY